# Patient Record
Sex: MALE | Race: ASIAN | NOT HISPANIC OR LATINO | Employment: UNEMPLOYED | URBAN - METROPOLITAN AREA
[De-identification: names, ages, dates, MRNs, and addresses within clinical notes are randomized per-mention and may not be internally consistent; named-entity substitution may affect disease eponyms.]

---

## 2021-01-20 ENCOUNTER — HOSPITAL ENCOUNTER (EMERGENCY)
Facility: CLINIC | Age: 41
Discharge: HOME | End: 2021-01-20
Attending: EMERGENCY MEDICINE | Admitting: EMERGENCY MEDICINE
Payer: COMMERCIAL

## 2021-01-20 ENCOUNTER — APPOINTMENT (OUTPATIENT)
Dept: MRI IMAGING | Facility: CLINIC | Age: 41
End: 2021-01-20
Attending: EMERGENCY MEDICINE
Payer: COMMERCIAL

## 2021-01-20 ENCOUNTER — APPOINTMENT (OUTPATIENT)
Dept: CT IMAGING | Facility: CLINIC | Age: 41
End: 2021-01-20
Attending: EMERGENCY MEDICINE
Payer: COMMERCIAL

## 2021-01-20 VITALS
SYSTOLIC BLOOD PRESSURE: 165 MMHG | DIASTOLIC BLOOD PRESSURE: 112 MMHG | WEIGHT: 210 LBS | BODY MASS INDEX: 28.44 KG/M2 | HEIGHT: 72 IN | TEMPERATURE: 98.2 F | HEART RATE: 78 BPM | OXYGEN SATURATION: 100 % | RESPIRATION RATE: 18 BRPM

## 2021-01-20 DIAGNOSIS — R93.89 ABNORMAL MRI: ICD-10-CM

## 2021-01-20 DIAGNOSIS — I10 ESSENTIAL HYPERTENSION: ICD-10-CM

## 2021-01-20 LAB
ANION GAP SERPL CALCULATED.3IONS-SCNC: 1 MMOL/L (ref 3–14)
BASOPHILS # BLD AUTO: 0 10E9/L (ref 0–0.2)
BASOPHILS NFR BLD AUTO: 0.1 %
BUN SERPL-MCNC: 16 MG/DL (ref 7–30)
CALCIUM SERPL-MCNC: 9.1 MG/DL (ref 8.5–10.1)
CHLORIDE SERPL-SCNC: 105 MMOL/L (ref 94–109)
CO2 SERPL-SCNC: 32 MMOL/L (ref 20–32)
CREAT SERPL-MCNC: 1.11 MG/DL (ref 0.66–1.25)
DIFFERENTIAL METHOD BLD: ABNORMAL
EOSINOPHIL # BLD AUTO: 0.1 10E9/L (ref 0–0.7)
EOSINOPHIL NFR BLD AUTO: 0.7 %
ERYTHROCYTE [DISTWIDTH] IN BLOOD BY AUTOMATED COUNT: 14.1 % (ref 10–15)
GFR SERPL CREATININE-BSD FRML MDRD: 82 ML/MIN/{1.73_M2}
GLUCOSE SERPL-MCNC: 95 MG/DL (ref 70–99)
HCT VFR BLD AUTO: 43.2 % (ref 40–53)
HGB BLD-MCNC: 13.2 G/DL (ref 13.3–17.7)
IMM GRANULOCYTES # BLD: 0 10E9/L (ref 0–0.4)
IMM GRANULOCYTES NFR BLD: 0.6 %
LYMPHOCYTES # BLD AUTO: 2.2 10E9/L (ref 0.8–5.3)
LYMPHOCYTES NFR BLD AUTO: 31.2 %
MCH RBC QN AUTO: 24.3 PG (ref 26.5–33)
MCHC RBC AUTO-ENTMCNC: 30.6 G/DL (ref 31.5–36.5)
MCV RBC AUTO: 79 FL (ref 78–100)
MONOCYTES # BLD AUTO: 0.4 10E9/L (ref 0–1.3)
MONOCYTES NFR BLD AUTO: 5.8 %
NEUTROPHILS # BLD AUTO: 4.3 10E9/L (ref 1.6–8.3)
NEUTROPHILS NFR BLD AUTO: 61.6 %
NRBC # BLD AUTO: 0 10*3/UL
NRBC BLD AUTO-RTO: 0 /100
PLATELET # BLD AUTO: 227 10E9/L (ref 150–450)
POTASSIUM SERPL-SCNC: 4 MMOL/L (ref 3.4–5.3)
RBC # BLD AUTO: 5.44 10E12/L (ref 4.4–5.9)
SODIUM SERPL-SCNC: 138 MMOL/L (ref 133–144)
WBC # BLD AUTO: 7 10E9/L (ref 4–11)

## 2021-01-20 PROCEDURE — 80048 BASIC METABOLIC PNL TOTAL CA: CPT | Performed by: EMERGENCY MEDICINE

## 2021-01-20 PROCEDURE — A9585 GADOBUTROL INJECTION: HCPCS | Performed by: EMERGENCY MEDICINE

## 2021-01-20 PROCEDURE — 70553 MRI BRAIN STEM W/O & W/DYE: CPT

## 2021-01-20 PROCEDURE — 96374 THER/PROPH/DIAG INJ IV PUSH: CPT

## 2021-01-20 PROCEDURE — 255N000002 HC RX 255 OP 636: Performed by: EMERGENCY MEDICINE

## 2021-01-20 PROCEDURE — 250N000013 HC RX MED GY IP 250 OP 250 PS 637: Performed by: EMERGENCY MEDICINE

## 2021-01-20 PROCEDURE — 96375 TX/PRO/DX INJ NEW DRUG ADDON: CPT

## 2021-01-20 PROCEDURE — 70450 CT HEAD/BRAIN W/O DYE: CPT

## 2021-01-20 PROCEDURE — 85025 COMPLETE CBC W/AUTO DIFF WBC: CPT | Performed by: EMERGENCY MEDICINE

## 2021-01-20 PROCEDURE — 250N000011 HC RX IP 250 OP 636: Performed by: EMERGENCY MEDICINE

## 2021-01-20 PROCEDURE — 99285 EMERGENCY DEPT VISIT HI MDM: CPT | Mod: 25

## 2021-01-20 PROCEDURE — 93005 ELECTROCARDIOGRAM TRACING: CPT

## 2021-01-20 RX ORDER — TETRACAINE HYDROCHLORIDE 5 MG/ML
SOLUTION OPHTHALMIC
Status: DISCONTINUED
Start: 2021-01-20 | End: 2021-01-21 | Stop reason: HOSPADM

## 2021-01-20 RX ORDER — HYDROCHLOROTHIAZIDE 25 MG/1
25 TABLET ORAL ONCE
Status: DISCONTINUED | OUTPATIENT
Start: 2021-01-20 | End: 2021-01-20

## 2021-01-20 RX ORDER — HYDROCHLOROTHIAZIDE 25 MG/1
25 TABLET ORAL DAILY
Status: DISCONTINUED | OUTPATIENT
Start: 2021-01-21 | End: 2021-01-20

## 2021-01-20 RX ORDER — LIDOCAINE 40 MG/G
CREAM TOPICAL
Status: DISCONTINUED | OUTPATIENT
Start: 2021-01-20 | End: 2021-01-21 | Stop reason: HOSPADM

## 2021-01-20 RX ORDER — HYDROMORPHONE HYDROCHLORIDE 1 MG/ML
0.5 INJECTION, SOLUTION INTRAMUSCULAR; INTRAVENOUS; SUBCUTANEOUS ONCE
Status: COMPLETED | OUTPATIENT
Start: 2021-01-20 | End: 2021-01-20

## 2021-01-20 RX ORDER — DIAZEPAM 10 MG/2ML
5 INJECTION, SOLUTION INTRAMUSCULAR; INTRAVENOUS ONCE
Status: COMPLETED | OUTPATIENT
Start: 2021-01-20 | End: 2021-01-20

## 2021-01-20 RX ORDER — ACETAMINOPHEN 500 MG
1000 TABLET ORAL ONCE
Status: COMPLETED | OUTPATIENT
Start: 2021-01-20 | End: 2021-01-20

## 2021-01-20 RX ORDER — HYDROCHLOROTHIAZIDE 25 MG/1
25 TABLET ORAL DAILY
Qty: 30 TABLET | Refills: 0 | Status: SHIPPED | OUTPATIENT
Start: 2021-01-20 | End: 2023-06-28

## 2021-01-20 RX ORDER — HYDROCHLOROTHIAZIDE 25 MG/1
25 TABLET ORAL ONCE
Status: COMPLETED | OUTPATIENT
Start: 2021-01-20 | End: 2021-01-20

## 2021-01-20 RX ORDER — GADOBUTROL 604.72 MG/ML
7.5 INJECTION INTRAVENOUS ONCE
Status: COMPLETED | OUTPATIENT
Start: 2021-01-20 | End: 2021-01-20

## 2021-01-20 RX ADMIN — HYDROCHLOROTHIAZIDE 25 MG: 25 TABLET ORAL at 23:34

## 2021-01-20 RX ADMIN — ACETAMINOPHEN 1000 MG: 500 TABLET, FILM COATED ORAL at 18:03

## 2021-01-20 RX ADMIN — HYDROMORPHONE HYDROCHLORIDE 0.5 MG: 1 INJECTION, SOLUTION INTRAMUSCULAR; INTRAVENOUS; SUBCUTANEOUS at 18:03

## 2021-01-20 RX ADMIN — GADOBUTROL 7.5 ML: 604.72 INJECTION INTRAVENOUS at 21:48

## 2021-01-20 RX ADMIN — DIAZEPAM 5 MG: 10 INJECTION, SOLUTION INTRAMUSCULAR; INTRAVENOUS at 20:52

## 2021-01-20 ASSESSMENT — ENCOUNTER SYMPTOMS
HEMATURIA: 0
WEAKNESS: 1
NUMBNESS: 1
HEADACHES: 1
BACK PAIN: 1
FREQUENCY: 0
NAUSEA: 0
DYSURIA: 0

## 2021-01-20 ASSESSMENT — MIFFLIN-ST. JEOR: SCORE: 1895.55

## 2021-01-20 NOTE — ED PROVIDER NOTES
History   Chief Complaint:  Headache       HPI   Josselyn Brewster is a 40 year old male with history of chronic back pain who presents with headache. The patient reports that he scheduled to have an epidural injection done today for his back pain, was noticed to be hypertensive, was sent to urgent care, and was finally sent here to the ED. The patient notes that his blood pressure at  was 197/70. Patient endorses intermittent headaches over the past few days and states that he has a 1/10 headache currently located behind his left eye that has been present for the past few hours. He states that he has noted this headache intermittently over the past few days. Patient also endorses 7/10 back pain now and notes that he took a dose of oxycodone at 1200 today. Patient also notes that he has been experiencing intermittent numbness in his bilateral hands and weakness in his right foot since he had back surgery on 9/24/20. The patient denies photophobia, urinary changes, nausea, and other issues.      Review of Systems   Eyes: Negative for visual disturbance.   Gastrointestinal: Negative for nausea.   Genitourinary: Negative for dysuria, frequency, hematuria and urgency.   Musculoskeletal: Positive for back pain.   Neurological: Positive for weakness, numbness and headaches.   All other systems reviewed and are negative.        Allergies:  Levofloxacin  Morphine     Medications:  Adderall  Buspirone  Prozac  Neurontin  Oxycodone     Past Medical History:    Patient denies history of headaches and hypertension.   Arthritis  Limitation of joint movement  Chronic back pain  Opioid abuse  Lumbago    Depression  Essential hypertension    Past Surgical History:    Revision right L4, 5 Microdiskectomy open - 9/24/2020   Laminectomy - 1999     Family History:    The patient denies past family history.     Social History:  Patient presents to the ED alone.     Physical Exam     Patient Vitals for the past 24 hrs:   BP Temp Temp  src Pulse Resp SpO2 Height Weight   01/20/21 2040 (!) 189/119 -- -- 81 -- 100 % -- --   01/20/21 1920 (!) 197/118 -- -- 77 -- 100 % -- --   01/20/21 1900 (!) 190/116 -- -- 80 -- 99 % -- --   01/20/21 1845 (!) 198/127 -- -- 81 -- 100 % -- --   01/20/21 1830 (!) 193/118 -- -- 79 -- 100 % -- --   01/20/21 1715 -- -- -- 82 -- 100 % -- --   01/20/21 1705 (!) 191/121 -- -- 85 -- -- -- --   01/20/21 1639 (!) 201/127 98.2  F (36.8  C) Oral 88 18 99 % 1.829 m (6') 95.3 kg (210 lb)       Physical Exam  HENT:    Mouth/Throat: Oropharynx is without swelling or erythema. Oral mucosa moist.    Eyes: Conjunctivae are normal. No scleral icterus. Left eye intraocular pressure: 16  Neck: Neck supple. No cervical adenopathy. No meningismus.   Cardiovascular: Normal rate, regular rhythm and intact distal pulses.    Pulmonary/Chest: Effort normal and breath sounds normal.   Abdominal: Soft.  No distension. There is no tenderness.   Musculoskeletal:  No edema, No calf tenderness  Neurological:Alert and oriented. Coordination normal. Paresthesias of bilateral hands, left great than right. Right lower extremity weakness with dorsi flexion which he states is baseline, otherwise equal and normal strength in other extremities. Cranial nerves 2-12 intact.   Skin: Skin is warm and dry.   Psychiatric: Normal mood and Anxious affect.     Emergency Department Course   ECG (17:37:11):  Rate 86 bpm. WI interval 168. QRS duration 112. QT/QTc 402/481. P-R-T axes 53 50 47. Normal sinus rhythm. Possible left atrial enlargement. Prolonged QT. Abnormal ECG Interpreted at 1742 by Prisca Iglesias MD.       Imaging:  MR Brain w/o & w Contrast   Final Result   IMPRESSION:   1.  Nonspecific focus of T2 prolongation in the left frontal centrum   semiovale correlating with the finding on CT.   2.  The lack of mass effect and enhancement are consistent with a   benign lesion. An area of gliosis is favored.   3.  Findings not consistent with an area of  demyelination.   4.  6 month follow-up to ensure stability is recommended.   5.  Superimposed mild presumed chronic small vessel ischemic change in   the supratentorial white matter.   6.  No hemorrhage or stroke.      KIM SHIN MD      Head CT w/o contrast   Final Result   IMPRESSION:   1.  Ill-defined, non-masslike hypoattenuation in the left frontal deep white matter with maintained gray-white matter differentiation, as described above. This is indeterminate, though could still represent an area of ischemia or potentially a vascular    malformation, with artifact felt to be less likely. MRI brain without and with IV contrast is recommended for further characterization.   2.  No acute intracranial hemorrhage or hydrocephalus.         Results discussed with GLORIA CRAMER on 1/20/2021 8:12 PM.          Laboratory:  CBC: HGB 13.2 (L), WNL (WBC 7.0, )  BMP: Anion Gap 1 (L), WNL (Creatinine 1.11)       Emergency Department Course:    Reviewed:  I reviewed nursing notes, vitals and care everywhere    Assessments:  1652: I initially evaluated the patient in ED room 21.    1751: I reassessed the patient who stated their headache was a 1/10 and back pain a 7/10. Patient's allergies discussed. Allergy to morphine makes it difficult for the patient to urinate.       On repeat evaluation I updated him on the CT findings and spoke with him about the need for an MRI.  2300: Updated the patient on MRI findings, importance of follow-up, importance of taking blood pressure medication and having a blood pressure check again in 2 days.    Consults:  2013: I spoke with radiology who updated me on the patient's ct read.   2250: I spoke with the radiologist about MRI read.     Interventions:  Medications   lidocaine 1 % 0.1-1 mL (has no administration in time range)   lidocaine (LMX4) cream (has no administration in time range)   sodium chloride (PF) 0.9% PF flush 3 mL (has no administration in time range)    sodium chloride (PF) 0.9% PF flush 3 mL (has no administration in time range)   tetracaine (PONTOCAINE) 0.5 % ophthalmic solution (has no administration in time range)   hydrochlorothiazide (HYDRODIURIL) tablet 25 mg (has no administration in time range)   acetaminophen (TYLENOL) tablet 1,000 mg (1,000 mg Oral Given 1/20/21 1803)   HYDROmorphone (PF) (DILAUDID) injection 0.5 mg (0.5 mg Intravenous Given 1/20/21 1803)   diazepam (VALIUM) injection 5 mg (5 mg Intravenous Given 1/20/21 2052)   gadobutrol (GADAVIST) injection 7.5 mL (7.5 mLs Intravenous Given 1/20/21 2148)     1803, Tylenol, 1000 mg, PO  1803, Dilaudid, 0.5 mg, IV  2052, Valium, 5 mg, IV  Hydrochlorothiazide 25 mg PO    Disposition:  The patient was discharged to home. Plans to call brother.       Impression & Plan   Medical Decision Making:  Josselyn Brewster is a 40 year old male resents when he was noted to have high blood pressure today at an appointment to have a injection for back pain.  He also describes a few days of intermittent mild left sided headache.  He had no other neuro symptoms.  There is scant blood pressures in his medical records however I did note that he had a blood pressure of 171/117 in September of this past year.  However due to persistent hypertension and neurologic symptoms after adequately controlling his back pain a CT scan was obtained which revealed the nonspecific findings outlined above.  For this reason MRI was indicated.  The MRI returned without findings concerning for stroke.  In speaking with the radiologist he felt that likely the findings were consistent with scar type tissue but did recommend follow-up MRI in 6 months.  I feel likely the patient has had long-term poorly controlled blood pressure and therefore I will initiate antihypertensive medications in the emergency department.  He understands the importance of close follow-up in the next few days as it is likely he will require back and antihypertensive.   Also important to follow-up for evaluation of his headache and arrangement for repeat MRI.  RN will communicate this to his brother when he arrived to drive the patient home as he did receive Valium for the imaging.  Recommended returning to the emergency department with any new worsening or concerning symptoms.    Diagnosis:    ICD-10-CM    1. Essential hypertension  I10    2. Abnormal MRI  R93.89     follow up brain MRI in 6 months.       Discharge Medications:  New Prescriptions    HYDROCHLOROTHIAZIDE (HYDRODIURIL) 25 MG TABLET    Take 1 tablet (25 mg) by mouth daily       Scribe Disclosure:  IAddy, am serving as a scribe at 4:52 PM on 1/20/2021 to document services personally performed by Prisca Iglesias MD based on my observations and the provider's statements to me.     [facility]   January 20, 2021      Prisca Iglesias MD  01/20/21 0685

## 2021-01-20 NOTE — ED TRIAGE NOTES
"Pt presents for evaluation of hypertension associated with a headache. Pt was at the clinic today for an injection for known back pain. Was sent to , but was then sent here due to BP. Pt notes a headache for the last 2-3 days, around \"the crown of his head, and sometimes behind the left eye\". denies vision changes. Hx of numbness in bilateral hands and weakness in right foot since back surgery.  "

## 2021-01-21 LAB — INTERPRETATION ECG - MUSE: NORMAL

## 2021-01-21 NOTE — DISCHARGE INSTRUCTIONS
Take next dose of blood pressure medication in the morning.     Discharge Instructions  Headache    You were seen today for a headache. Headaches may be caused by many different things such as muscle tension, sinus inflammation, anxiety and stress, having too little sleep, too much alcohol, some medical conditions or injury. You may have a migraine, which is caused by changes in the blood vessels in your head.  At this time your provider does not find that your headache is a sign of anything dangerous or life-threatening.  However, sometimes the signs of serious illness do not show up right away.      Generally, every Emergency Department visit should have a follow-up clinic visit with either a primary or a specialty clinic/provider. Please follow-up as instructed by your emergency provider today.    Return to the Emergency Department if:  You get a new fever of 100.4 F or higher.  Your headache gets much worse.  You get a stiff neck with your headache.  You get a new headache that is significantly different or worse than headaches you have had before.  You are vomiting (throwing up) and cannot keep food or water down.  You have blurry or double vision or other problems with your eyes.  You have a new weakness on one side of your body.  You have difficulty with balance which is new.  You or your family thinks you are confused.  You have a seizure.    What can I do to help myself?  Pain medications - You may take a pain medication such as Tylenol  (acetaminophen), Advil , Motrin  (ibuprofen) or Aleve  (naproxen).  Take a pain reliever as soon as you notice symptoms.  Starting medications as soon as you start to have symptoms may lessen the amount of pain you have.  Relaxing in a quiet, dark room may help.  Get enough sleep and eat meals regularly.  You may need to watch for certain foods or other things which may trigger your headaches.  Keeping a journal of your headaches and possible triggers may help you and your  primary provider to identify things which you should avoid which may be causing your headaches.  If you were given a prescription for medicine here today, be sure to read all of the information (including the package insert) that comes with your prescription.  This will include important information about the medicine, its side effects, and any warnings that you need to know about.  The pharmacist who fills the prescription can provide more information and answer questions you may have about the medicine.  If you have questions or concerns that the pharmacist cannot address, please call or return to the Emergency Department.   Remember that you can always come back to the Emergency Department if you are not able to see your regular provider in the amount of time listed above, if you get any new symptoms, or if there is anything that worries you.

## 2021-05-29 ENCOUNTER — RECORDS - HEALTHEAST (OUTPATIENT)
Dept: ADMINISTRATIVE | Facility: CLINIC | Age: 41
End: 2021-05-29

## 2021-05-30 ENCOUNTER — RECORDS - HEALTHEAST (OUTPATIENT)
Dept: ADMINISTRATIVE | Facility: CLINIC | Age: 41
End: 2021-05-30

## 2021-06-02 ENCOUNTER — RECORDS - HEALTHEAST (OUTPATIENT)
Dept: ADMINISTRATIVE | Facility: CLINIC | Age: 41
End: 2021-06-02

## 2021-06-29 ENCOUNTER — APPOINTMENT (OUTPATIENT)
Dept: GENERAL RADIOLOGY | Facility: CLINIC | Age: 41
End: 2021-06-29
Attending: EMERGENCY MEDICINE
Payer: COMMERCIAL

## 2021-06-29 ENCOUNTER — APPOINTMENT (OUTPATIENT)
Dept: MRI IMAGING | Facility: CLINIC | Age: 41
End: 2021-06-29
Attending: EMERGENCY MEDICINE
Payer: COMMERCIAL

## 2021-06-29 ENCOUNTER — HOSPITAL ENCOUNTER (EMERGENCY)
Facility: CLINIC | Age: 41
Discharge: HOME | End: 2021-06-29
Attending: EMERGENCY MEDICINE | Admitting: EMERGENCY MEDICINE
Payer: COMMERCIAL

## 2021-06-29 VITALS
HEART RATE: 81 BPM | BODY MASS INDEX: 28.44 KG/M2 | OXYGEN SATURATION: 95 % | HEIGHT: 72 IN | TEMPERATURE: 98.4 F | SYSTOLIC BLOOD PRESSURE: 149 MMHG | DIASTOLIC BLOOD PRESSURE: 104 MMHG | WEIGHT: 210 LBS | RESPIRATION RATE: 18 BRPM

## 2021-06-29 DIAGNOSIS — R07.9 CHEST PAIN, UNSPECIFIED TYPE: ICD-10-CM

## 2021-06-29 DIAGNOSIS — R51.9 ACUTE NONINTRACTABLE HEADACHE, UNSPECIFIED HEADACHE TYPE: ICD-10-CM

## 2021-06-29 DIAGNOSIS — I10 BENIGN ESSENTIAL HYPERTENSION: ICD-10-CM

## 2021-06-29 LAB
ANION GAP SERPL CALCULATED.3IONS-SCNC: 3 MMOL/L (ref 3–14)
BASOPHILS # BLD AUTO: 0 10E9/L (ref 0–0.2)
BASOPHILS NFR BLD AUTO: 0.1 %
BUN SERPL-MCNC: 20 MG/DL (ref 7–30)
CALCIUM SERPL-MCNC: 9.2 MG/DL (ref 8.5–10.1)
CHLORIDE SERPL-SCNC: 106 MMOL/L (ref 94–109)
CO2 SERPL-SCNC: 28 MMOL/L (ref 20–32)
CREAT SERPL-MCNC: 1.17 MG/DL (ref 0.66–1.25)
D DIMER PPP FEU-MCNC: <0.3 UG/ML FEU (ref 0–0.5)
DIFFERENTIAL METHOD BLD: ABNORMAL
EOSINOPHIL # BLD AUTO: 0 10E9/L (ref 0–0.7)
EOSINOPHIL NFR BLD AUTO: 0.6 %
ERYTHROCYTE [DISTWIDTH] IN BLOOD BY AUTOMATED COUNT: 13.3 % (ref 10–15)
GFR SERPL CREATININE-BSD FRML MDRD: 77 ML/MIN/{1.73_M2}
GLUCOSE SERPL-MCNC: 114 MG/DL (ref 70–99)
HCT VFR BLD AUTO: 44.5 % (ref 40–53)
HGB BLD-MCNC: 13.9 G/DL (ref 13.3–17.7)
IMM GRANULOCYTES # BLD: 0 10E9/L (ref 0–0.4)
IMM GRANULOCYTES NFR BLD: 0.4 %
INTERPRETATION ECG - MUSE: NORMAL
LABORATORY COMMENT REPORT: ABNORMAL
LYMPHOCYTES # BLD AUTO: 1.7 10E9/L (ref 0.8–5.3)
LYMPHOCYTES NFR BLD AUTO: 22.9 %
MCH RBC QN AUTO: 25 PG (ref 26.5–33)
MCHC RBC AUTO-ENTMCNC: 31.2 G/DL (ref 31.5–36.5)
MCV RBC AUTO: 80 FL (ref 78–100)
MONOCYTES # BLD AUTO: 0.4 10E9/L (ref 0–1.3)
MONOCYTES NFR BLD AUTO: 6 %
NEUTROPHILS # BLD AUTO: 5 10E9/L (ref 1.6–8.3)
NEUTROPHILS NFR BLD AUTO: 70 %
NRBC # BLD AUTO: 0 10*3/UL
NRBC BLD AUTO-RTO: 0 /100
PLATELET # BLD AUTO: 236 10E9/L (ref 150–450)
POTASSIUM SERPL-SCNC: 4 MMOL/L (ref 3.4–5.3)
RBC # BLD AUTO: 5.55 10E12/L (ref 4.4–5.9)
SARS-COV-2 RNA RESP QL NAA+PROBE: POSITIVE
SODIUM SERPL-SCNC: 137 MMOL/L (ref 133–144)
SPECIMEN SOURCE: ABNORMAL
TROPONIN I SERPL-MCNC: <0.015 UG/L (ref 0–0.04)
TROPONIN I SERPL-MCNC: <0.015 UG/L (ref 0–0.04)
WBC # BLD AUTO: 7.2 10E9/L (ref 4–11)

## 2021-06-29 PROCEDURE — 96374 THER/PROPH/DIAG INJ IV PUSH: CPT | Mod: 59

## 2021-06-29 PROCEDURE — 85379 FIBRIN DEGRADATION QUANT: CPT | Performed by: EMERGENCY MEDICINE

## 2021-06-29 PROCEDURE — 87635 SARS-COV-2 COVID-19 AMP PRB: CPT | Performed by: EMERGENCY MEDICINE

## 2021-06-29 PROCEDURE — 250N000011 HC RX IP 250 OP 636: Performed by: EMERGENCY MEDICINE

## 2021-06-29 PROCEDURE — 80048 BASIC METABOLIC PNL TOTAL CA: CPT | Performed by: EMERGENCY MEDICINE

## 2021-06-29 PROCEDURE — 84484 ASSAY OF TROPONIN QUANT: CPT | Performed by: EMERGENCY MEDICINE

## 2021-06-29 PROCEDURE — 255N000002 HC RX 255 OP 636: Performed by: EMERGENCY MEDICINE

## 2021-06-29 PROCEDURE — 96375 TX/PRO/DX INJ NEW DRUG ADDON: CPT

## 2021-06-29 PROCEDURE — 70553 MRI BRAIN STEM W/O & W/DYE: CPT

## 2021-06-29 PROCEDURE — 93005 ELECTROCARDIOGRAM TRACING: CPT

## 2021-06-29 PROCEDURE — 84484 ASSAY OF TROPONIN QUANT: CPT | Mod: 91 | Performed by: EMERGENCY MEDICINE

## 2021-06-29 PROCEDURE — 96361 HYDRATE IV INFUSION ADD-ON: CPT

## 2021-06-29 PROCEDURE — A9585 GADOBUTROL INJECTION: HCPCS | Performed by: EMERGENCY MEDICINE

## 2021-06-29 PROCEDURE — 99285 EMERGENCY DEPT VISIT HI MDM: CPT | Mod: 25

## 2021-06-29 PROCEDURE — 71045 X-RAY EXAM CHEST 1 VIEW: CPT

## 2021-06-29 PROCEDURE — 250N000013 HC RX MED GY IP 250 OP 250 PS 637: Performed by: EMERGENCY MEDICINE

## 2021-06-29 PROCEDURE — 85025 COMPLETE CBC W/AUTO DIFF WBC: CPT | Performed by: EMERGENCY MEDICINE

## 2021-06-29 PROCEDURE — 258N000003 HC RX IP 258 OP 636: Performed by: EMERGENCY MEDICINE

## 2021-06-29 PROCEDURE — C9803 HOPD COVID-19 SPEC COLLECT: HCPCS

## 2021-06-29 RX ORDER — LORAZEPAM 1 MG/1
1 TABLET ORAL ONCE
Status: COMPLETED | OUTPATIENT
Start: 2021-06-29 | End: 2021-06-29

## 2021-06-29 RX ORDER — LABETALOL 100 MG/1
100 TABLET, FILM COATED ORAL 2 TIMES DAILY
Qty: 28 TABLET | Refills: 0 | Status: ON HOLD | OUTPATIENT
Start: 2021-06-29 | End: 2023-07-03

## 2021-06-29 RX ORDER — DIPHENHYDRAMINE HYDROCHLORIDE 50 MG/ML
25 INJECTION INTRAMUSCULAR; INTRAVENOUS ONCE
Status: COMPLETED | OUTPATIENT
Start: 2021-06-29 | End: 2021-06-29

## 2021-06-29 RX ORDER — ACETAMINOPHEN 325 MG/1
975 TABLET ORAL ONCE
Status: COMPLETED | OUTPATIENT
Start: 2021-06-29 | End: 2021-06-29

## 2021-06-29 RX ORDER — ONDANSETRON 2 MG/ML
4 INJECTION INTRAMUSCULAR; INTRAVENOUS ONCE
Status: COMPLETED | OUTPATIENT
Start: 2021-06-29 | End: 2021-06-29

## 2021-06-29 RX ORDER — METOCLOPRAMIDE HYDROCHLORIDE 5 MG/ML
10 INJECTION INTRAMUSCULAR; INTRAVENOUS ONCE
Status: COMPLETED | OUTPATIENT
Start: 2021-06-29 | End: 2021-06-29

## 2021-06-29 RX ORDER — LABETALOL 100 MG/1
100 TABLET, FILM COATED ORAL ONCE
Status: COMPLETED | OUTPATIENT
Start: 2021-06-29 | End: 2021-06-29

## 2021-06-29 RX ORDER — GADOBUTROL 604.72 MG/ML
10 INJECTION INTRAVENOUS ONCE
Status: COMPLETED | OUTPATIENT
Start: 2021-06-29 | End: 2021-06-29

## 2021-06-29 RX ADMIN — ACETAMINOPHEN 975 MG: 325 TABLET, FILM COATED ORAL at 05:16

## 2021-06-29 RX ADMIN — LABETALOL HYDROCHLORIDE 100 MG: 100 TABLET ORAL at 06:39

## 2021-06-29 RX ADMIN — METOCLOPRAMIDE HYDROCHLORIDE 10 MG: 5 INJECTION INTRAMUSCULAR; INTRAVENOUS at 05:13

## 2021-06-29 RX ADMIN — SODIUM CHLORIDE 1000 ML: 9 INJECTION, SOLUTION INTRAVENOUS at 05:12

## 2021-06-29 RX ADMIN — DIPHENHYDRAMINE HYDROCHLORIDE 25 MG: 50 INJECTION INTRAMUSCULAR; INTRAVENOUS at 05:13

## 2021-06-29 RX ADMIN — GADOBUTROL 10 ML: 604.72 INJECTION INTRAVENOUS at 07:11

## 2021-06-29 RX ADMIN — LORAZEPAM 1 MG: 1 TABLET ORAL at 05:59

## 2021-06-29 RX ADMIN — ONDANSETRON 4 MG: 2 INJECTION INTRAMUSCULAR; INTRAVENOUS at 03:58

## 2021-06-29 ASSESSMENT — ENCOUNTER SYMPTOMS
NECK PAIN: 0
NAUSEA: 1
DIAPHORESIS: 1
HEADACHES: 1

## 2021-06-29 ASSESSMENT — MIFFLIN-ST. JEOR: SCORE: 1895.55

## 2021-06-29 NOTE — ED PROVIDER NOTES
History   Chief Complaint:  Chest Pain; Headache        The history is provided by the patient.      Josselyn Brewster is a 40 year old male with history of hypertension and polysubstance abuse who presents with an episode of chest pain and headache. The patient states that today he had a headache, felt diaphoretic and clammy before experiencing chest pain. He then checked his blood pressure and noted it to be elevated before he experienced two minutes of blurry vision which has since resolved. He states that his blood pressure has been elevated lately but today this was higher. He notes that his headache has resolved as well but notes that he is now nauseous. He also notes that he has had headaches since his spinal surgery last November. He denies any history of heart issues. He notes of neck soreness but no neck pain today. He notes of occasional alcohol use but denies drug or tobacco use. No trauma to the head or other recent injury.      Review of Systems   Constitutional: Positive for diaphoresis.   Eyes: Positive for visual disturbance (resolved).   Cardiovascular: Positive for chest pain.   Gastrointestinal: Positive for nausea.   Musculoskeletal: Negative for neck pain.   Neurological: Positive for headaches.   All other systems reviewed and are negative.      Allergies:  Levofloxacin  Morphine    Medications:  Adderall  Fluoxetine  Gabapentin  Hydrochlorothiazide  Buspirone HCl    Past Medical History:    Lumbago  Major depressive disorder  Polysubstance abuse  Personality disorder  Opiate dependence  Hypertension    Past Surgical History:    Lumbar epidural steroid infection x3  Laminectomy    Social History:  The patient presents to the emergency department alone.  The patient enjoys playing [employer] ball.     Physical Exam     Patient Vitals for the past 24 hrs:   BP Temp Temp src Pulse Resp SpO2 Height Weight   06/29/21 0630 (!) 181/110 -- -- 84 -- 99 % -- --   06/29/21 0545 (!) 203/114 -- -- 87 -- 98 %  -- --   21 0530 (!) 192/113 -- -- 85 -- 98 % -- --   21 0517 (!) 199/129 -- -- -- -- 100 % -- --   21 0506 (!) 211/129 98.4  F (36.9  C) Oral 86 18 100 % -- --   21 0341 (!) 220/131 98.4  F (36.9  C) Oral 95 18 100 % 1.829 m (6') 95.3 kg (210 lb)       Physical Exam  General: Patient is awake, alert and interactive when I enter the room  Head: The scalp, face, and head appear normal  Eyes: The pupils are equal, round, and reactive to light. Conjunctivae and sclerae are normal  Neck: Normal range of motion.   CV: Regular rate and rhythm.   Resp: Lungs are clear without wheezes or rales. No respiratory distress.   GI: Abdomen is soft, no rigidity, guarding, or rebound. No distension. No tenderness to palpation in any quadrant.     MS: Normal tone. Joints grossly normal without effusions. No asymmetric leg swelling, calf or thigh tenderness.    Skin: No rash or lesions noted. Normal capillary refill noted  Neuro: CN's II-XII without obvious abnormality.   Neg Romberg.  5/5 UE and LE strength which is symmetrical.   Reflexes are 2+ and symmetrical.   Finger to Nose testing is intact bilaterally.    Light touch is symmetrical bilateral UE's and LE's.  PERRLA, EOMI.    No meningismus and full neck AROM/PROM.   Psych:  Normal affect.  Appropriate interactions.    Emergency Department Course     ECG:  ECG taken at 0345, ECG read at 0504  Normal sinus rhythm. Normal ECG.  N change as compared to prior EKG dated 21   Rate 92 bpm. KY interval 176 ms. QRS duration 110 ms. QT/QTc 380/469 ms. P-R-T axis 56 61 50.       Imaging:  MR Brain w/o & with contrast:  Pending    Laboratory:  CBC: WBC: 7.2, HB.9, PLT: 236    BMP: Glucose 114 (high), o/w WNL (Creatinine: 1.17)    Troponin (0357): <0.015    Emergency Department Course:    Reviewed:  I reviewed the patient's nursing notes, vitals, past medical records, Care Everywhere.     Assessments:  0448: I assessed the patient and performed a physical  exam at this time.    Interventions:  0358 Zofran 4mg IV  0512 NS 1L IV   0513 Benadryl 25mg IV  0513 Reglan 10mg IV  0516 Tylenol 975mg PO  0559 Ativan 1mg PO  0639 Labetalol 100mg PO    Disposition:  Care of the patient was transferred to my colleague Dr. Francois pending MRI results.     Impression & Plan     Medical Decision Making:  Josselyn Brewster is a 40 year old male with history of hypertension and polysubstance abuse who presents with an episode of chest pain and headache.   Upon initial evaluation the patient is quite hypertensive but otherwise hemodynamically stable and afebrile.  On physical exam he does not have any acute neurological deficits.  However given his complaint of headache and nausea I was concerned about the possibility of intracranial pathology .  Given his significant hypertension I elected to perform an MRI of his brain.  The results of this are currently pending.  If MRI does not show any significant acute sinister pathology I believe he likely can be discharged home and started on antihypertensive regime.  Chest pain work-up did not show any acute signs of ischemia nor dysrhythmia.  No other acute signs of endorgan damage requiring inpatient admission.  Patient be signed out to my colleague Dr. Francois pending final results.    Diagnosis:    ICD-10-CM    1. Benign essential hypertension  I10    2. Acute nonintractable headache, unspecified headache type  R51.9    3. Chest pain, unspecified type  R07.9        Discharge Medications:  New Prescriptions    LABETALOL (NORMODYNE) 100 MG TABLET    Take 1 tablet (100 mg) by mouth 2 times daily       Scribe Disclosure:  I, Donovan Gonsales, am serving as a scribe at 4:48 AM on 6/29/2021 to document services personally performed by Will Leahy MD based on my observations and the provider's statements to me.          Will Leahy MD  06/29/21 7933

## 2021-06-29 NOTE — ED NOTES
DATE:  6/29/2021   TIME OF RECEIPT FROM LAB:  0724  LAB TEST:  covid  LAB VALUE:  positive  RESULTS GIVEN WITH READ-BACK TO (PROVIDER):  young  TIME LAB VALUE REPORTED TO PROVIDER:   0724

## 2021-06-29 NOTE — ED TRIAGE NOTES
Here for left sided chest pain started around 11pm, associated with /129, headache, nausea, clammy, lightheaded. ABCs intact.

## 2021-06-29 NOTE — DISCHARGE INSTRUCTIONS
You are found to have significantly elevated blood pressure here today and will need to follow-up with your primary care doctor for further evaluation of your hypertension.  I will start you on medications for your blood pressure.  We did not find any significant evidence of heart damage or neurological damage based on your work-up here today.

## 2021-06-29 NOTE — ED PROVIDER NOTES
"0700 Patient is signed out to me by Dr. Leahy pending MRI brain and COVID test with presentation of headache and chest pain in the setting of hypertension (not medicated daily). COVID test positive soon after transfer of care. Patient reports known COVID positive from test in \"February or March\". He denies respiratory symptoms. He denies current chest pain. Will test further for chest pain in the setting of recent COVID and await MRI brain results.     0812 Radiology results reviewed by me.    XR CHEST PORT 1 VIEW 6/29/2021 7:47 AM     HISTORY: chest pain, positive COVID 19     COMPARISON: None.                                                                      IMPRESSION: No acute findings. The lungs are clear and there are no  pleural effusions. Normal heart size.     CAMMY BOLDEN MD    MRI BRAIN WITHOUT AND WITH CONTRAST June 29, 2021 7:17 AM     HISTORY: Headache and blurred vision that began yesterday.  Intracranial hemorrhage suspected.      TECHNIQUE: Multiplanar, multisequence MRI of the brain without and  with 10 mL Gadavist.     COMPARISON: 1/20/2021.     FINDINGS: Diffusion-weighted images are normal. There is no evidence  for intracranial hemorrhage or acute infarct. There are a few  scattered signal hyperintensities in the supratentorial white matter  with the largest lesion in the left frontal lobe measuring  approximately 0.8 x 1.3 cm in size. This is not appreciably changed  since the prior exam. Brain parenchyma is otherwise normal. Ventricles  and subarachnoid spaces are normal. Postcontrast images do not show  any abnormal areas of enhancement or any focal mass lesions.                                                                      IMPRESSION:  1. Few scattered nonspecific white matter lesions which are stable  since 1/20/2021. The pattern could be related to gliosis, chronic  ischemic change, or old demyelination.  2. No evidence for acute hemorrhage, acute infarct, or a focal " mass  lesions.     MD Alessandro CHAIREZ Tracy Dianne, MD  07/08/21 5164

## 2023-06-28 ENCOUNTER — APPOINTMENT (OUTPATIENT)
Dept: CT IMAGING | Facility: CLINIC | Age: 43
End: 2023-06-28
Attending: EMERGENCY MEDICINE
Payer: COMMERCIAL

## 2023-06-28 ENCOUNTER — APPOINTMENT (OUTPATIENT)
Dept: ULTRASOUND IMAGING | Facility: CLINIC | Age: 43
End: 2023-06-28
Attending: EMERGENCY MEDICINE
Payer: COMMERCIAL

## 2023-06-28 ENCOUNTER — HOSPITAL ENCOUNTER (INPATIENT)
Facility: CLINIC | Age: 43
LOS: 5 days | Discharge: HOME | End: 2023-07-03
Attending: EMERGENCY MEDICINE | Admitting: INTERNAL MEDICINE
Payer: COMMERCIAL

## 2023-06-28 DIAGNOSIS — K85.90 ACUTE PANCREATITIS, UNSPECIFIED COMPLICATION STATUS, UNSPECIFIED PANCREATITIS TYPE: ICD-10-CM

## 2023-06-28 DIAGNOSIS — K85.90 ACUTE PANCREATITIS WITHOUT INFECTION OR NECROSIS, UNSPECIFIED PANCREATITIS TYPE: ICD-10-CM

## 2023-06-28 DIAGNOSIS — E78.1 HYPERTRIGLYCERIDEMIA: ICD-10-CM

## 2023-06-28 DIAGNOSIS — E13.10 DIABETIC KETOACIDOSIS WITHOUT COMA ASSOCIATED WITH OTHER SPECIFIED DIABETES MELLITUS (H): Primary | ICD-10-CM

## 2023-06-28 DIAGNOSIS — F10.29 ALCOHOL DEPENDENCE WITH UNSPECIFIED ALCOHOL-INDUCED DISORDER (H): ICD-10-CM

## 2023-06-28 LAB
ALBUMIN SERPL BCG-MCNC: 3.6 G/DL (ref 3.5–5.2)
ALBUMIN SERPL BCG-MCNC: NORMAL G/DL
ALP SERPL-CCNC: 78 U/L (ref 40–129)
ALP SERPL-CCNC: NORMAL U/L
ALT SERPL W P-5'-P-CCNC: 21 U/L (ref 0–70)
ALT SERPL W P-5'-P-CCNC: NORMAL U/L
ANION GAP SERPL CALCULATED.3IONS-SCNC: 11 MMOL/L (ref 7–15)
ANION GAP SERPL CALCULATED.3IONS-SCNC: 12 MMOL/L (ref 7–15)
ANION GAP SERPL CALCULATED.3IONS-SCNC: 13 MMOL/L (ref 7–15)
ANION GAP SERPL CALCULATED.3IONS-SCNC: 14 MMOL/L (ref 7–15)
ANION GAP SERPL CALCULATED.3IONS-SCNC: 17 MMOL/L (ref 7–15)
ANION GAP SERPL CALCULATED.3IONS-SCNC: 18 MMOL/L (ref 7–15)
ANION GAP SERPL CALCULATED.3IONS-SCNC: NORMAL MMOL/L
AST SERPL W P-5'-P-CCNC: ABNORMAL U/L
AST SERPL W P-5'-P-CCNC: NORMAL U/L
ATRIAL RATE - MUSE: 90 BPM
B-OH-BUTYR SERPL-SCNC: 2 MMOL/L
B-OH-BUTYR SERPL-SCNC: 2.2 MMOL/L
B-OH-BUTYR SERPL-SCNC: 2.6 MMOL/L
B-OH-BUTYR SERPL-SCNC: 4.1 MMOL/L
BASOPHILS # BLD AUTO: 0 10E3/UL (ref 0–0.2)
BASOPHILS # BLD AUTO: 0 10E3/UL (ref 0–0.2)
BASOPHILS NFR BLD AUTO: 0 %
BASOPHILS NFR BLD AUTO: 0 %
BILIRUB DIRECT SERPL-MCNC: <0.2 MG/DL (ref 0–0.3)
BILIRUB SERPL-MCNC: 0.5 MG/DL
BILIRUB SERPL-MCNC: NORMAL MG/DL
BUN SERPL-MCNC: 3.6 MG/DL (ref 6–20)
BUN SERPL-MCNC: 4.5 MG/DL (ref 6–20)
BUN SERPL-MCNC: 4.7 MG/DL (ref 6–20)
BUN SERPL-MCNC: 5.5 MG/DL (ref 6–20)
BUN SERPL-MCNC: 7.7 MG/DL (ref 6–20)
BUN SERPL-MCNC: 9.6 MG/DL (ref 6–20)
BUN SERPL-MCNC: NORMAL MG/DL
CALCIUM SERPL-MCNC: 8.1 MG/DL (ref 8.6–10)
CALCIUM SERPL-MCNC: 8.2 MG/DL (ref 8.6–10)
CALCIUM SERPL-MCNC: 8.2 MG/DL (ref 8.6–10)
CALCIUM SERPL-MCNC: 8.3 MG/DL (ref 8.6–10)
CALCIUM SERPL-MCNC: 8.4 MG/DL (ref 8.6–10)
CALCIUM SERPL-MCNC: 8.4 MG/DL (ref 8.6–10)
CALCIUM SERPL-MCNC: NORMAL MG/DL
CHLORIDE SERPL-SCNC: 93 MMOL/L (ref 98–107)
CHLORIDE SERPL-SCNC: 95 MMOL/L (ref 98–107)
CHLORIDE SERPL-SCNC: 96 MMOL/L (ref 98–107)
CHLORIDE SERPL-SCNC: 97 MMOL/L (ref 98–107)
CHLORIDE SERPL-SCNC: 97 MMOL/L (ref 98–107)
CHLORIDE SERPL-SCNC: 98 MMOL/L (ref 98–107)
CHLORIDE SERPL-SCNC: NORMAL MMOL/L
CHOLEST SERPL-MCNC: 869 MG/DL
CREAT SERPL-MCNC: 0.66 MG/DL (ref 0.67–1.17)
CREAT SERPL-MCNC: 0.69 MG/DL (ref 0.67–1.17)
CREAT SERPL-MCNC: 0.75 MG/DL (ref 0.67–1.17)
CREAT SERPL-MCNC: 0.76 MG/DL (ref 0.67–1.17)
CREAT SERPL-MCNC: 0.76 MG/DL (ref 0.67–1.17)
CREAT SERPL-MCNC: 0.79 MG/DL (ref 0.67–1.17)
CREAT SERPL-MCNC: NORMAL MG/DL
DEPRECATED HCO3 PLAS-SCNC: 15 MMOL/L (ref 22–29)
DEPRECATED HCO3 PLAS-SCNC: 16 MMOL/L (ref 22–29)
DEPRECATED HCO3 PLAS-SCNC: 18 MMOL/L (ref 22–29)
DEPRECATED HCO3 PLAS-SCNC: 20 MMOL/L (ref 22–29)
DEPRECATED HCO3 PLAS-SCNC: 20 MMOL/L (ref 22–29)
DEPRECATED HCO3 PLAS-SCNC: 22 MMOL/L (ref 22–29)
DEPRECATED HCO3 PLAS-SCNC: NORMAL MMOL/L
DIASTOLIC BLOOD PRESSURE - MUSE: NORMAL MMHG
EOSINOPHIL # BLD AUTO: 0.1 10E3/UL (ref 0–0.7)
EOSINOPHIL # BLD AUTO: 0.1 10E3/UL (ref 0–0.7)
EOSINOPHIL NFR BLD AUTO: 1 %
EOSINOPHIL NFR BLD AUTO: 1 %
ERYTHROCYTE [DISTWIDTH] IN BLOOD BY AUTOMATED COUNT: 14 % (ref 10–15)
ERYTHROCYTE [DISTWIDTH] IN BLOOD BY AUTOMATED COUNT: 14.5 % (ref 10–15)
GFR SERPL CREATININE-BSD FRML MDRD: >90 ML/MIN/1.73M2
GFR SERPL CREATININE-BSD FRML MDRD: NORMAL ML/MIN/{1.73_M2}
GLUCOSE BLDC GLUCOMTR-MCNC: 160 MG/DL (ref 70–99)
GLUCOSE BLDC GLUCOMTR-MCNC: 161 MG/DL (ref 70–99)
GLUCOSE BLDC GLUCOMTR-MCNC: 162 MG/DL (ref 70–99)
GLUCOSE BLDC GLUCOMTR-MCNC: 163 MG/DL (ref 70–99)
GLUCOSE BLDC GLUCOMTR-MCNC: 164 MG/DL (ref 70–99)
GLUCOSE BLDC GLUCOMTR-MCNC: 165 MG/DL (ref 70–99)
GLUCOSE BLDC GLUCOMTR-MCNC: 166 MG/DL (ref 70–99)
GLUCOSE BLDC GLUCOMTR-MCNC: 168 MG/DL (ref 70–99)
GLUCOSE BLDC GLUCOMTR-MCNC: 171 MG/DL (ref 70–99)
GLUCOSE BLDC GLUCOMTR-MCNC: 172 MG/DL (ref 70–99)
GLUCOSE BLDC GLUCOMTR-MCNC: 172 MG/DL (ref 70–99)
GLUCOSE BLDC GLUCOMTR-MCNC: 174 MG/DL (ref 70–99)
GLUCOSE BLDC GLUCOMTR-MCNC: 180 MG/DL (ref 70–99)
GLUCOSE BLDC GLUCOMTR-MCNC: 182 MG/DL (ref 70–99)
GLUCOSE BLDC GLUCOMTR-MCNC: 206 MG/DL (ref 70–99)
GLUCOSE BLDC GLUCOMTR-MCNC: 236 MG/DL (ref 70–99)
GLUCOSE BLDC GLUCOMTR-MCNC: 252 MG/DL (ref 70–99)
GLUCOSE BLDC GLUCOMTR-MCNC: 280 MG/DL (ref 70–99)
GLUCOSE BLDC GLUCOMTR-MCNC: 388 MG/DL (ref 70–99)
GLUCOSE SERPL-MCNC: 142 MG/DL (ref 70–99)
GLUCOSE SERPL-MCNC: 148 MG/DL (ref 70–99)
GLUCOSE SERPL-MCNC: 159 MG/DL (ref 70–99)
GLUCOSE SERPL-MCNC: 161 MG/DL (ref 70–99)
GLUCOSE SERPL-MCNC: 356 MG/DL (ref 70–99)
GLUCOSE SERPL-MCNC: 454 MG/DL (ref 70–99)
GLUCOSE SERPL-MCNC: NORMAL MG/DL
HBA1C MFR BLD: 13.6 %
HCT VFR BLD AUTO: 36.5 % (ref 40–53)
HCT VFR BLD AUTO: 38.7 % (ref 40–53)
HDLC SERPL-MCNC: 19 MG/DL
HGB BLD-MCNC: 12.6 G/DL (ref 13.3–17.7)
HGB BLD-MCNC: 14 G/DL (ref 13.3–17.7)
HOLD SPECIMEN: NORMAL
HOLD SPECIMEN: NORMAL
IMM GRANULOCYTES # BLD: 0 10E3/UL
IMM GRANULOCYTES # BLD: 0 10E3/UL
IMM GRANULOCYTES NFR BLD: 0 %
IMM GRANULOCYTES NFR BLD: 1 %
INTERPRETATION ECG - MUSE: NORMAL
LACTATE SERPL-SCNC: 0.8 MMOL/L (ref 0.7–2)
LDLC SERPL CALC-MCNC: ABNORMAL MG/DL
LDLC SERPL DIRECT ASSAY-MCNC: 51 MG/DL
LIPASE SERPL-CCNC: 140 U/L (ref 13–60)
LIPASE SERPL-CCNC: 176 U/L (ref 13–60)
LYMPHOCYTES # BLD AUTO: 1.1 10E3/UL (ref 0.8–5.3)
LYMPHOCYTES # BLD AUTO: 1.2 10E3/UL (ref 0.8–5.3)
LYMPHOCYTES NFR BLD AUTO: 14 %
LYMPHOCYTES NFR BLD AUTO: 15 %
MAGNESIUM SERPL-MCNC: 1.9 MG/DL (ref 1.7–2.3)
MCH RBC QN AUTO: 28.1 PG (ref 26.5–33)
MCH RBC QN AUTO: 28.6 PG (ref 26.5–33)
MCHC RBC AUTO-ENTMCNC: 34.5 G/DL (ref 31.5–36.5)
MCHC RBC AUTO-ENTMCNC: 36.2 G/DL (ref 31.5–36.5)
MCV RBC AUTO: 79 FL (ref 78–100)
MCV RBC AUTO: 82 FL (ref 78–100)
MONOCYTES # BLD AUTO: 0.6 10E3/UL (ref 0–1.3)
MONOCYTES # BLD AUTO: 0.6 10E3/UL (ref 0–1.3)
MONOCYTES NFR BLD AUTO: 7 %
MONOCYTES NFR BLD AUTO: 9 %
NEUTROPHILS # BLD AUTO: 5.3 10E3/UL (ref 1.6–8.3)
NEUTROPHILS # BLD AUTO: 6.4 10E3/UL (ref 1.6–8.3)
NEUTROPHILS NFR BLD AUTO: 75 %
NEUTROPHILS NFR BLD AUTO: 77 %
NONHDLC SERPL-MCNC: 850 MG/DL
NRBC # BLD AUTO: 0 10E3/UL
NRBC # BLD AUTO: 0 10E3/UL
NRBC BLD AUTO-RTO: 0 /100
NRBC BLD AUTO-RTO: 0 /100
P AXIS - MUSE: 33 DEGREES
PLATELET # BLD AUTO: 197 10E3/UL (ref 150–450)
PLATELET # BLD AUTO: 249 10E3/UL (ref 150–450)
POTASSIUM SERPL-SCNC: 3.4 MMOL/L (ref 3.4–5.3)
POTASSIUM SERPL-SCNC: 3.5 MMOL/L (ref 3.4–5.3)
POTASSIUM SERPL-SCNC: 3.6 MMOL/L (ref 3.4–5.3)
POTASSIUM SERPL-SCNC: 3.8 MMOL/L (ref 3.4–5.3)
POTASSIUM SERPL-SCNC: 4.1 MMOL/L (ref 3.4–5.3)
POTASSIUM SERPL-SCNC: 4.9 MMOL/L (ref 3.4–5.3)
POTASSIUM SERPL-SCNC: NORMAL MMOL/L
PR INTERVAL - MUSE: 158 MS
PROT SERPL-MCNC: 6.4 G/DL (ref 6.4–8.3)
PROT SERPL-MCNC: NORMAL G/DL
QRS DURATION - MUSE: 102 MS
QT - MUSE: 374 MS
QTC - MUSE: 457 MS
R AXIS - MUSE: 43 DEGREES
RBC # BLD AUTO: 4.48 10E6/UL (ref 4.4–5.9)
RBC # BLD AUTO: 4.89 10E6/UL (ref 4.4–5.9)
SODIUM SERPL-SCNC: 127 MMOL/L (ref 136–145)
SODIUM SERPL-SCNC: 128 MMOL/L (ref 136–145)
SODIUM SERPL-SCNC: 128 MMOL/L (ref 136–145)
SODIUM SERPL-SCNC: 129 MMOL/L (ref 136–145)
SODIUM SERPL-SCNC: 129 MMOL/L (ref 136–145)
SODIUM SERPL-SCNC: 131 MMOL/L (ref 136–145)
SODIUM SERPL-SCNC: NORMAL MMOL/L
SYSTOLIC BLOOD PRESSURE - MUSE: NORMAL MMHG
T AXIS - MUSE: 66 DEGREES
TRIGL SERPL-MCNC: 3584 MG/DL
TROPONIN T SERPL HS-MCNC: 12 NG/L
VENTRICULAR RATE- MUSE: 90 BPM
WBC # BLD AUTO: 7.1 10E3/UL (ref 4–11)
WBC # BLD AUTO: 8.3 10E3/UL (ref 4–11)

## 2023-06-28 PROCEDURE — 82010 KETONE BODYS QUAN: CPT | Performed by: INTERNAL MEDICINE

## 2023-06-28 PROCEDURE — 250N000011 HC RX IP 250 OP 636: Performed by: INTERNAL MEDICINE

## 2023-06-28 PROCEDURE — 96374 THER/PROPH/DIAG INJ IV PUSH: CPT | Mod: 59

## 2023-06-28 PROCEDURE — 83735 ASSAY OF MAGNESIUM: CPT | Performed by: INTERNAL MEDICINE

## 2023-06-28 PROCEDURE — 80061 LIPID PANEL: CPT | Performed by: INTERNAL MEDICINE

## 2023-06-28 PROCEDURE — 258N000003 HC RX IP 258 OP 636: Performed by: INTERNAL MEDICINE

## 2023-06-28 PROCEDURE — 84484 ASSAY OF TROPONIN QUANT: CPT | Performed by: EMERGENCY MEDICINE

## 2023-06-28 PROCEDURE — 84155 ASSAY OF PROTEIN SERUM: CPT | Performed by: EMERGENCY MEDICINE

## 2023-06-28 PROCEDURE — 93005 ELECTROCARDIOGRAM TRACING: CPT

## 2023-06-28 PROCEDURE — 82310 ASSAY OF CALCIUM: CPT | Performed by: EMERGENCY MEDICINE

## 2023-06-28 PROCEDURE — 250N000011 HC RX IP 250 OP 636: Mod: JZ | Performed by: EMERGENCY MEDICINE

## 2023-06-28 PROCEDURE — 83690 ASSAY OF LIPASE: CPT | Performed by: EMERGENCY MEDICINE

## 2023-06-28 PROCEDURE — 83721 ASSAY OF BLOOD LIPOPROTEIN: CPT | Performed by: INTERNAL MEDICINE

## 2023-06-28 PROCEDURE — 99222 1ST HOSP IP/OBS MODERATE 55: CPT | Performed by: INTERNAL MEDICINE

## 2023-06-28 PROCEDURE — 83605 ASSAY OF LACTIC ACID: CPT | Performed by: EMERGENCY MEDICINE

## 2023-06-28 PROCEDURE — 82010 KETONE BODYS QUAN: CPT | Performed by: EMERGENCY MEDICINE

## 2023-06-28 PROCEDURE — 82962 GLUCOSE BLOOD TEST: CPT

## 2023-06-28 PROCEDURE — 258N000002 HC RX IP 258 OP 250: Performed by: INTERNAL MEDICINE

## 2023-06-28 PROCEDURE — 80048 BASIC METABOLIC PNL TOTAL CA: CPT | Performed by: INTERNAL MEDICINE

## 2023-06-28 PROCEDURE — 99285 EMERGENCY DEPT VISIT HI MDM: CPT | Mod: 25

## 2023-06-28 PROCEDURE — 250N000011 HC RX IP 250 OP 636: Performed by: HOSPITALIST

## 2023-06-28 PROCEDURE — 82248 BILIRUBIN DIRECT: CPT | Performed by: EMERGENCY MEDICINE

## 2023-06-28 PROCEDURE — 74177 CT ABD & PELVIS W/CONTRAST: CPT

## 2023-06-28 PROCEDURE — 96361 HYDRATE IV INFUSION ADD-ON: CPT

## 2023-06-28 PROCEDURE — 200N000001 HC R&B ICU

## 2023-06-28 PROCEDURE — 250N000011 HC RX IP 250 OP 636: Performed by: EMERGENCY MEDICINE

## 2023-06-28 PROCEDURE — 83036 HEMOGLOBIN GLYCOSYLATED A1C: CPT | Performed by: EMERGENCY MEDICINE

## 2023-06-28 PROCEDURE — 36415 COLL VENOUS BLD VENIPUNCTURE: CPT | Performed by: INTERNAL MEDICINE

## 2023-06-28 PROCEDURE — 85025 COMPLETE CBC W/AUTO DIFF WBC: CPT | Performed by: EMERGENCY MEDICINE

## 2023-06-28 PROCEDURE — 250N000013 HC RX MED GY IP 250 OP 250 PS 637: Performed by: HOSPITALIST

## 2023-06-28 PROCEDURE — 96376 TX/PRO/DX INJ SAME DRUG ADON: CPT

## 2023-06-28 PROCEDURE — 258N000003 HC RX IP 258 OP 636: Performed by: EMERGENCY MEDICINE

## 2023-06-28 PROCEDURE — 76705 ECHO EXAM OF ABDOMEN: CPT

## 2023-06-28 PROCEDURE — 83690 ASSAY OF LIPASE: CPT | Performed by: INTERNAL MEDICINE

## 2023-06-28 PROCEDURE — 250N000009 HC RX 250: Performed by: INTERNAL MEDICINE

## 2023-06-28 PROCEDURE — 99207 PR NO BILLABLE SERVICE THIS VISIT: CPT | Performed by: HOSPITALIST

## 2023-06-28 PROCEDURE — 85025 COMPLETE CBC W/AUTO DIFF WBC: CPT | Performed by: INTERNAL MEDICINE

## 2023-06-28 PROCEDURE — 36415 COLL VENOUS BLD VENIPUNCTURE: CPT | Performed by: EMERGENCY MEDICINE

## 2023-06-28 PROCEDURE — 96375 TX/PRO/DX INJ NEW DRUG ADDON: CPT

## 2023-06-28 RX ORDER — NICOTINE POLACRILEX 4 MG
15-30 LOZENGE BUCCAL
Status: DISCONTINUED | OUTPATIENT
Start: 2023-06-28 | End: 2023-07-02

## 2023-06-28 RX ORDER — ENOXAPARIN SODIUM 100 MG/ML
40 INJECTION SUBCUTANEOUS EVERY 24 HOURS
Status: DISCONTINUED | OUTPATIENT
Start: 2023-06-28 | End: 2023-07-03 | Stop reason: HOSPADM

## 2023-06-28 RX ORDER — NICOTINE POLACRILEX 4 MG
15-30 LOZENGE BUCCAL
Status: DISCONTINUED | OUTPATIENT
Start: 2023-06-28 | End: 2023-06-28

## 2023-06-28 RX ORDER — ACETAMINOPHEN 500 MG
500-1000 TABLET ORAL EVERY 6 HOURS PRN
Status: ON HOLD | COMMUNITY
End: 2023-12-25

## 2023-06-28 RX ORDER — HYDROMORPHONE HYDROCHLORIDE 1 MG/ML
.3-.5 INJECTION, SOLUTION INTRAMUSCULAR; INTRAVENOUS; SUBCUTANEOUS
Status: DISCONTINUED | OUTPATIENT
Start: 2023-06-28 | End: 2023-06-28

## 2023-06-28 RX ORDER — AMOXICILLIN 250 MG
2 CAPSULE ORAL 2 TIMES DAILY PRN
Status: DISCONTINUED | OUTPATIENT
Start: 2023-06-28 | End: 2023-07-03 | Stop reason: HOSPADM

## 2023-06-28 RX ORDER — HYDROMORPHONE HYDROCHLORIDE 1 MG/ML
0.5 INJECTION, SOLUTION INTRAMUSCULAR; INTRAVENOUS; SUBCUTANEOUS EVERY 30 MIN PRN
Status: COMPLETED | OUTPATIENT
Start: 2023-06-28 | End: 2023-06-28

## 2023-06-28 RX ORDER — HYDROCHLOROTHIAZIDE 25 MG/1
25 TABLET ORAL DAILY
Status: ON HOLD | COMMUNITY
End: 2023-07-03

## 2023-06-28 RX ORDER — ONDANSETRON 2 MG/ML
4 INJECTION INTRAMUSCULAR; INTRAVENOUS EVERY 30 MIN PRN
Status: DISCONTINUED | OUTPATIENT
Start: 2023-06-28 | End: 2023-06-28

## 2023-06-28 RX ORDER — LIDOCAINE 40 MG/G
CREAM TOPICAL
Status: DISCONTINUED | OUTPATIENT
Start: 2023-06-28 | End: 2023-07-02

## 2023-06-28 RX ORDER — HYDROMORPHONE HYDROCHLORIDE 1 MG/ML
0.5 INJECTION, SOLUTION INTRAMUSCULAR; INTRAVENOUS; SUBCUTANEOUS
Status: COMPLETED | OUTPATIENT
Start: 2023-06-28 | End: 2023-06-28

## 2023-06-28 RX ORDER — IOPAMIDOL 755 MG/ML
500 INJECTION, SOLUTION INTRAVASCULAR ONCE
Status: COMPLETED | OUTPATIENT
Start: 2023-06-28 | End: 2023-06-28

## 2023-06-28 RX ORDER — ONDANSETRON 4 MG/1
4 TABLET, ORALLY DISINTEGRATING ORAL EVERY 6 HOURS PRN
Status: DISCONTINUED | OUTPATIENT
Start: 2023-06-28 | End: 2023-07-03 | Stop reason: HOSPADM

## 2023-06-28 RX ORDER — CITALOPRAM HYDROBROMIDE 40 MG/1
40 TABLET ORAL DAILY
COMMUNITY

## 2023-06-28 RX ORDER — DEXTROSE MONOHYDRATE 25 G/50ML
25-50 INJECTION, SOLUTION INTRAVENOUS
Status: DISCONTINUED | OUTPATIENT
Start: 2023-06-28 | End: 2023-06-28

## 2023-06-28 RX ORDER — HYDROXYZINE HCL 10 MG/5 ML
20 SOLUTION, ORAL ORAL EVERY 6 HOURS PRN
Status: DISCONTINUED | OUTPATIENT
Start: 2023-06-28 | End: 2023-06-29

## 2023-06-28 RX ORDER — AMOXICILLIN 250 MG
1 CAPSULE ORAL 2 TIMES DAILY PRN
Status: DISCONTINUED | OUTPATIENT
Start: 2023-06-28 | End: 2023-07-03 | Stop reason: HOSPADM

## 2023-06-28 RX ORDER — ONDANSETRON 2 MG/ML
4 INJECTION INTRAMUSCULAR; INTRAVENOUS EVERY 6 HOURS PRN
Status: DISCONTINUED | OUTPATIENT
Start: 2023-06-28 | End: 2023-07-03 | Stop reason: HOSPADM

## 2023-06-28 RX ORDER — DEXTROSE MONOHYDRATE 25 G/50ML
25-50 INJECTION, SOLUTION INTRAVENOUS
Status: DISCONTINUED | OUTPATIENT
Start: 2023-06-28 | End: 2023-07-02

## 2023-06-28 RX ORDER — SODIUM CHLORIDE 9 MG/ML
INJECTION, SOLUTION INTRAVENOUS CONTINUOUS
Status: DISCONTINUED | OUTPATIENT
Start: 2023-06-28 | End: 2023-06-28

## 2023-06-28 RX ORDER — IBUPROFEN 200 MG
200-600 TABLET ORAL EVERY 4 HOURS PRN
Status: ON HOLD | COMMUNITY
End: 2023-12-25

## 2023-06-28 RX ORDER — HYDROMORPHONE HYDROCHLORIDE 1 MG/ML
.5-1 INJECTION, SOLUTION INTRAMUSCULAR; INTRAVENOUS; SUBCUTANEOUS
Status: DISCONTINUED | OUTPATIENT
Start: 2023-06-28 | End: 2023-07-02

## 2023-06-28 RX ADMIN — HYDROMORPHONE HYDROCHLORIDE 0.5 MG: 1 INJECTION, SOLUTION INTRAMUSCULAR; INTRAVENOUS; SUBCUTANEOUS at 05:46

## 2023-06-28 RX ADMIN — ONDANSETRON 4 MG: 2 INJECTION INTRAMUSCULAR; INTRAVENOUS at 01:26

## 2023-06-28 RX ADMIN — POTASSIUM CHLORIDE: 2 INJECTION, SOLUTION, CONCENTRATE INTRAVENOUS at 10:31

## 2023-06-28 RX ADMIN — Medication 3 UNITS/HR: at 17:10

## 2023-06-28 RX ADMIN — POTASSIUM CHLORIDE: 149 INJECTION, SOLUTION, CONCENTRATE INTRAVENOUS at 09:35

## 2023-06-28 RX ADMIN — HYDROMORPHONE HYDROCHLORIDE 0.5 MG: 1 INJECTION, SOLUTION INTRAMUSCULAR; INTRAVENOUS; SUBCUTANEOUS at 22:13

## 2023-06-28 RX ADMIN — POTASSIUM CHLORIDE: 2 INJECTION, SOLUTION, CONCENTRATE INTRAVENOUS at 20:41

## 2023-06-28 RX ADMIN — HYDROMORPHONE HYDROCHLORIDE 0.5 MG: 1 INJECTION, SOLUTION INTRAMUSCULAR; INTRAVENOUS; SUBCUTANEOUS at 20:07

## 2023-06-28 RX ADMIN — HYDROMORPHONE HYDROCHLORIDE 0.5 MG: 1 INJECTION, SOLUTION INTRAMUSCULAR; INTRAVENOUS; SUBCUTANEOUS at 08:33

## 2023-06-28 RX ADMIN — HYDROMORPHONE HYDROCHLORIDE 0.5 MG: 1 INJECTION, SOLUTION INTRAMUSCULAR; INTRAVENOUS; SUBCUTANEOUS at 01:26

## 2023-06-28 RX ADMIN — SODIUM CHLORIDE 1000 ML: 9 INJECTION, SOLUTION INTRAVENOUS at 05:35

## 2023-06-28 RX ADMIN — HYDROMORPHONE HYDROCHLORIDE 0.5 MG: 1 INJECTION, SOLUTION INTRAMUSCULAR; INTRAVENOUS; SUBCUTANEOUS at 09:56

## 2023-06-28 RX ADMIN — Medication 2.5 UNITS/HR: at 08:05

## 2023-06-28 RX ADMIN — SODIUM CHLORIDE 500 ML: 9 INJECTION, SOLUTION INTRAVENOUS at 08:33

## 2023-06-28 RX ADMIN — ONDANSETRON 4 MG: 2 INJECTION INTRAMUSCULAR; INTRAVENOUS at 23:08

## 2023-06-28 RX ADMIN — HYDROMORPHONE HYDROCHLORIDE 0.5 MG: 1 INJECTION, SOLUTION INTRAMUSCULAR; INTRAVENOUS; SUBCUTANEOUS at 03:42

## 2023-06-28 RX ADMIN — ENOXAPARIN SODIUM 40 MG: 40 INJECTION SUBCUTANEOUS at 20:07

## 2023-06-28 RX ADMIN — HYDROXYZINE HYDROCHLORIDE 20 MG: 10 SOLUTION ORAL at 11:07

## 2023-06-28 RX ADMIN — HYDROMORPHONE HYDROCHLORIDE 0.5 MG: 1 INJECTION, SOLUTION INTRAMUSCULAR; INTRAVENOUS; SUBCUTANEOUS at 12:30

## 2023-06-28 RX ADMIN — SODIUM CHLORIDE 1000 ML: 9 INJECTION, SOLUTION INTRAVENOUS at 07:59

## 2023-06-28 RX ADMIN — SODIUM CHLORIDE 1000 ML: 9 INJECTION, SOLUTION INTRAVENOUS at 01:26

## 2023-06-28 RX ADMIN — SODIUM CHLORIDE: 9 INJECTION, SOLUTION INTRAVENOUS at 06:42

## 2023-06-28 RX ADMIN — IOPAMIDOL 100 ML: 755 INJECTION, SOLUTION INTRAVENOUS at 02:04

## 2023-06-28 RX ADMIN — HYDROMORPHONE HYDROCHLORIDE 0.5 MG: 1 INJECTION, SOLUTION INTRAMUSCULAR; INTRAVENOUS; SUBCUTANEOUS at 14:40

## 2023-06-28 ASSESSMENT — ACTIVITIES OF DAILY LIVING (ADL)
TOILETING_ISSUES: NO
CHANGE_IN_FUNCTIONAL_STATUS_SINCE_ONSET_OF_CURRENT_ILLNESS/INJURY: NO
WEAR_GLASSES_OR_BLIND: NO
ADLS_ACUITY_SCORE: 18
DOING_ERRANDS_INDEPENDENTLY_DIFFICULTY: NO
ADLS_ACUITY_SCORE: 18
ADLS_ACUITY_SCORE: 35
ADLS_ACUITY_SCORE: 18
ADLS_ACUITY_SCORE: 18
DIFFICULTY_EATING/SWALLOWING: NO
ADLS_ACUITY_SCORE: 18
ADLS_ACUITY_SCORE: 18
ADLS_ACUITY_SCORE: 35
DRESSING/BATHING_DIFFICULTY: NO
WALKING_OR_CLIMBING_STAIRS_DIFFICULTY: NO
CONCENTRATING,_REMEMBERING_OR_MAKING_DECISIONS_DIFFICULTY: NO
FALL_HISTORY_WITHIN_LAST_SIX_MONTHS: NO
ADLS_ACUITY_SCORE: 18
ADLS_ACUITY_SCORE: 35

## 2023-06-28 NOTE — PROGRESS NOTES
"Assuming care.  This patient presented to the emergency department complaining of epigastric pain present for 3 days since last Sunday when he had alcohol beverage intake.  Denies prior history of alcohol pancreatitis, lipase and CT of the abdomen are compatible with this diagnosis.  He had felt the nausea but not vomiting.  On arrival his blood sugar has been noted to be 308..  He has a family history of diabetes but denies of knowing personal.  A1c 13.8 to correlate with average blood sugar around 350.  He has other chronic comorbidities including hyperlipidemia, high blood pressure, history of polysubstance abuse and alcohol consumption as documented by my partner on admission.  At the moment of my visit his pain is under control, he denies any nausea.  I have reviewed the lab work-up on his chart.  I discussed the treatment with the patient and his significant other.  Vital signs:  Temp: 98.9  F (37.2  C) Temp src: Oral BP: (Abnormal) 145/119 Pulse: 89   Resp: 17 SpO2: 97 % O2 Device: None (Room air)   Height: 180.3 cm (5' 11\") Weight: 88.8 kg (195 lb 12.3 oz)  Estimated body mass index is 27.3 kg/m  as calculated from the following:    Height as of this encounter: 1.803 m (5' 11\").    Weight as of this encounter: 88.8 kg (195 lb 12.3 oz).      I am adding Vistaril as adjuvant medication for pain control.  I agree with assessment and plan as outlined by my partner   "

## 2023-06-28 NOTE — ED NOTES
"[facility]  ED Nurse Handoff Report    ED Chief complaint: Fever and Abdominal Pain  . ED Diagnosis:   Final diagnoses:   Acute pancreatitis without infection or necrosis, unspecified pancreatitis type       Allergies:   Allergies   Allergen Reactions    Levofloxacin Hives    Morphine Other (See Comments)     Other reaction(s): Urinary Retention  Other reaction(s): Urinary Retention       Code Status: Full Code    Activity level - Baseline/Home:  independent.  Activity Level - Current:   independent.   Lift room needed: No.   Bariatric: No   Needed: No   Isolation: No.   Infection: Not Applicable.     Respiratory status: Room air    Vital Signs (within 30 minutes):   Vitals:    06/28/23 0010 06/28/23 0053 06/28/23 0330   BP: (!) 152/107 (!) 143/95 103/79   Pulse: 99 94 80   Resp: 20     Temp: 98.5  F (36.9  C) 98.8  F (37.1  C)    TempSrc: Oral Oral    SpO2: 97%  95%   Weight: 88.8 kg (195 lb 12.3 oz)     Height: 1.803 m (5' 11\")         Cardiac Rhythm:  ,      Pain level:    Patient confused: No.   Patient Falls Risk: patient and family education.   Elimination Status: Has voided     Patient Report - Initial Complaint: abdominal pain.   Focused Assessment:  Josselyn Brewster is a 42 year old male with history of osteoarthritis and right-side lower back pain who presents to the ED via car for evaluation of fevers and abdominal pain. Patient reports upper, primarily right-sided, abdominal pain began 1.5 days ago and notes pain is just under his ribcage. He states pain is 5/10 currently. He endorses fevers as well, one this morning measuring 102. He notes only eating chicken noodle soup the last 2 days. He denies changes in bowel movements, abdominal pain, or a history of abdominal surgeries.     Independent Historian:   None - Patient Only     Review of External Notes:       Medications:  " "  Norvasc  Cozaar  Adderall  Buspirone  Prozac  Neurontin  Hydrodiuril  Normodyne  Senokot  Zanaflex  Celexa  Lioresal     Past Medical History:    Hyperlipidemia  Hypertension  Depression  Osteoarthritis  Eczema  Spinal stenosis of lumbar region with neurogenic claudication  Acute right-sided low back pain with right-sided sciatica  Lumbar herniated disc  History of cocaine dependence  History of polysubstance abuse     Past Surgical History:    Spine surgery x4  Hernia repair     Physical Exam      Patient Vitals for the past 24 hrs:    BP Temp Temp src Pulse Resp SpO2 Height Weight   06/28/23 0053 (!) 143/95 98.8  F (37.1  C) Oral 94 -- -- -- --   06/28/23 0010 (!) 152/107 98.5  F (36.9  C) Oral 99 20 97 % 1.803 m (5' 11\") 88.8 kg (195 lb 12.3 oz)      Physical Exam  General: Patient is awake, alert and interactive when I enter the room. Appears uncomfortable.   Head: The scalp, face, and head appear normal  Eyes: Conjunctivae and sclerae are normal  Neck: Normal range of motion.   CV: Regular rate.   Resp:  No respiratory distress.   GI:  tenderness but abdomen is soft, no rigidity. No evidence of pulsatile mass. No fluid waves or evidence of ascites. No distension. No hernias or bruising are noted in detailed exam. No CVA tenderness.         MS: Normal tone.   Skin: Normal capillary refill noted  Neuro: Speech is normal and fluent. Face is symmetric. Moving all extremities.   Psych:  Normal affect.  Appropriate interactions.    Abnormal Results:   Labs Ordered and Resulted from Time of ED Arrival to Time of ED Departure   COMPREHENSIVE METABOLIC PANEL - Abnormal       Result Value    Sodium 127 (*)     Potassium 4.9      Chloride 93 (*)     Carbon Dioxide (CO2) 16 (*)     Anion Gap 18 (*)     Urea Nitrogen 9.6      Creatinine 0.76      Calcium 8.2 (*)     Glucose 454 (*)     Alkaline Phosphatase 78      AST        ALT 21      Protein Total 6.4      Albumin 3.6      Bilirubin Total 0.5      GFR Estimate >90   "   LIPASE - Abnormal    Lipase 140 (*)    CBC WITH PLATELETS AND DIFFERENTIAL - Abnormal    WBC Count 8.3      RBC Count 4.89      Hemoglobin 14.0      Hematocrit 38.7 (*)     MCV 79      MCH 28.6      MCHC 36.2      RDW 14.0      Platelet Count 249      % Neutrophils 77      % Lymphocytes 14      % Monocytes 7      % Eosinophils 1      % Basophils 0      % Immature Granulocytes 1      NRBCs per 100 WBC 0      Absolute Neutrophils 6.4      Absolute Lymphocytes 1.2      Absolute Monocytes 0.6      Absolute Eosinophils 0.1      Absolute Basophils 0.0      Absolute Immature Granulocytes 0.0      Absolute NRBCs 0.0     GLUCOSE BY METER - Abnormal    GLUCOSE BY METER POCT 388 (*)    KETONE BETA-HYDROXYBUTYRATE QUANTITATIVE, RAPID - Abnormal    Ketone (Beta-Hydroxybutyrate) Quantitative 4.10 (*)    HEMOGLOBIN A1C - Abnormal    Hemoglobin A1C 13.6 (*)    GLUCOSE BY METER - Abnormal    GLUCOSE BY METER POCT 280 (*)    BASIC METABOLIC PANEL - Abnormal    Sodium 128 (*)     Potassium 3.8      Chloride 96 (*)     Carbon Dioxide (CO2) 15 (*)     Anion Gap 17 (*)     Urea Nitrogen 7.7      Creatinine 0.75      Calcium 8.4 (*)     Glucose 356 (*)     GFR Estimate >90     GLUCOSE BY METER - Abnormal    GLUCOSE BY METER POCT 252 (*)    TROPONIN T, HIGH SENSITIVITY - Normal    Troponin T, High Sensitivity 12     BILIRUBIN DIRECT - Normal    Bilirubin Direct <0.20     LACTIC ACID WHOLE BLOOD - Normal    Lactic Acid 0.8     LIPID REFLEX TO DIRECT LDL PANEL   COMPREHENSIVE METABOLIC PANEL   LIPASE   GLUCOSE MONITOR NURSING POCT   GLUCOSE MONITOR NURSING POCT   MAGNESIUM   LIPID REFLEX TO DIRECT LDL PANEL   GLUCOSE MONITOR NURSING POCT        US Abdomen Limited (RUQ)   Final Result   IMPRESSION:   1.  Hepatic steatosis.   2.  Normal gallbladder.            CT Abdomen Pelvis w Contrast   Final Result   IMPRESSION:    1.  Acute pancreatitis head/uncinate process.   2.  Hepatic steatosis.          Treatments provided: See Epic  Family  Comments: at bedside  OBS brochure/video discussed/provided to patient:  N/A  ED Medications:   Medications   ondansetron (ZOFRAN) injection 4 mg (4 mg Intravenous $Given 6/28/23 0126)   HYDROmorphone (PF) (DILAUDID) injection 0.5 mg (0.5 mg Intravenous $Given 6/28/23 0342)   lidocaine 1 % 0.1-1 mL (has no administration in time range)   lidocaine (LMX4) cream (has no administration in time range)   sodium chloride (PF) 0.9% PF flush 3 mL (3 mLs Intracatheter Not Given 6/28/23 0539)   sodium chloride (PF) 0.9% PF flush 3 mL (has no administration in time range)   melatonin tablet 1 mg (has no administration in time range)   HYDROmorphone (PF) (DILAUDID) injection 0.3-0.5 mg (0.5 mg Intravenous $Given 6/28/23 0546)   senna-docusate (SENOKOT-S/PERICOLACE) 8.6-50 MG per tablet 1 tablet (has no administration in time range)     Or   senna-docusate (SENOKOT-S/PERICOLACE) 8.6-50 MG per tablet 2 tablet (has no administration in time range)   ondansetron (ZOFRAN ODT) ODT tab 4 mg (has no administration in time range)     Or   ondansetron (ZOFRAN) injection 4 mg (has no administration in time range)   0.9% sodium chloride BOLUS (has no administration in time range)   0.9% sodium chloride BOLUS (has no administration in time range)   insulin 1 unit/1mL in saline (NovoLIN, HumuLIN Regular) drip - DKA algorithm (has no administration in time range)   glucose gel 15-30 g (has no administration in time range)     Or   dextrose 50 % injection 25-50 mL (has no administration in time range)     Or   glucagon injection 1 mg (has no administration in time range)   enoxaparin ANTICOAGULANT (LOVENOX) injection 40 mg (has no administration in time range)   NaCl 0.45 % 1,000 mL with potassium chloride 30 mEq/L infusion (has no administration in time range)   dextrose 5% and 0.45% NaCl 1,000 mL with potassium chloride 30 mEq/L infusion (has no administration in time range)   0.9% sodium chloride BOLUS (0 mLs Intravenous Stopped 6/28/23  0463)   HYDROmorphone (PF) (DILAUDID) injection 0.5 mg (0.5 mg Intravenous $Given 6/28/23 0126)   iopamidol (ISOVUE-370) solution 500 mL (100 mLs Intravenous $Given 6/28/23 0204)   sodium chloride (PF) 0.9% PF flush 100 mL (65 mLs Intravenous $Given 6/28/23 0204)   0.9% sodium chloride BOLUS (0 mLs Intravenous Stopped 6/28/23 0650)       Drips infusing:  Yes  For the majority of the shift this patient was Green.   Interventions performed were NA.    Sepsis treatment initiated: No    Cares/treatment/interventions/medications to be completed following ED care: NA    ED Nurse Name: Gail Arriaza RN  7:59 AM

## 2023-06-28 NOTE — PLAN OF CARE
"  Problem: Plan of Care - These are the overarching goals to be used throughout the patient stay.    Goal: Plan of Care Review  Description: The Plan of Care Review/Shift note should be completed every shift.  The Outcome Evaluation is a brief statement about your assessment that the patient is improving, declining, or no change.  This information will be displayed automatically on your shift note.  6/28/2023 1528 by Ramandeep Larose RN  Outcome: Progressing  6/28/2023 1527 by Ramandeep Larose RN  Outcome: Not Progressing  Goal: Patient-Specific Goal (Individualized)  Description: You can add care plan individualizations to a care plan. Examples of Individualization might be:  \"Parent requests to be called daily at 9am for status\", \"I have a hard time hearing out of my right ear\", or \"Do not touch me to wake me up as it startles me\".  6/28/2023 1528 by Ramandeep Larose RN  Outcome: Progressing  6/28/2023 1527 by Ramandeep Larose RN  Outcome: Not Progressing  Flowsheets (Taken 6/28/2023 0945)  Anxieties, Fears or Concerns: New diagnosis of diabetes  Goal: Absence of Hospital-Acquired Illness or Injury  6/28/2023 1528 by Ramandeep Larose RN  Outcome: Progressing  6/28/2023 1527 by Ramandeep Larose RN  Outcome: Not Progressing  Intervention: Identify and Manage Fall Risk  Recent Flowsheet Documentation  Taken 6/28/2023 1200 by Ramandeep Larose RN  Safety Promotion/Fall Prevention:   nonskid shoes/slippers when out of bed   patient and family education   safety round/check completed  Taken 6/28/2023 1000 by Ramandeep Larose RN  Safety Promotion/Fall Prevention:   nonskid shoes/slippers when out of bed   patient and family education   safety round/check completed  Intervention: Prevent Skin Injury  Recent Flowsheet Documentation  Taken 6/28/2023 1200 by Ramandeep Larose RN  Body Position: position changed independently  Taken 6/28/2023 1000 by Ramandeep Larose RN  Body Position: position changed independently  Taken " 6/28/2023 0926 by Ramandeep Larose RN  Body Position: position changed independently  Intervention: Prevent and Manage VTE (Venous Thromboembolism) Risk  Recent Flowsheet Documentation  Taken 6/28/2023 1200 by Ramandeep Larose RN  VTE Prevention/Management: SCDs (sequential compression devices) off  Taken 6/28/2023 1000 by Ramandeep Larose RN  VTE Prevention/Management: SCDs (sequential compression devices) off  Goal: Optimal Comfort and Wellbeing  6/28/2023 1528 by Ramandeep Larose RN  Outcome: Progressing  6/28/2023 1527 by Ramandeep Larose RN  Outcome: Not Progressing  Intervention: Provide Person-Centered Care  Recent Flowsheet Documentation  Taken 6/28/2023 1200 by Ramandeep Larose RN  Trust Relationship/Rapport:   care explained   questions answered  Taken 6/28/2023 0945 by Ramandeep Larose RN  Trust Relationship/Rapport:   care explained   questions answered  Goal: Readiness for Transition of Care  6/28/2023 1528 by Ramandeep Larose RN  Outcome: Progressing  6/28/2023 1527 by Ramandeep Larose RN  Outcome: Not Progressing  Flowsheets (Taken 6/28/2023 1100)  Transportation Anticipated: car, drives self  Intervention: Mutually Develop Transition Plan  Recent Flowsheet Documentation  Taken 6/28/2023 1100 by Ramandeep Larose RN  Transportation Anticipated: car, drives self  Patient/Family Anticipated Services at Transition: none  Patient/Family Anticipates Transition to: home  Taken 6/28/2023 1000 by Ramandeep Larose RN  Equipment Currently Used at Home: none     Problem: Diabetic Ketoacidosis  Goal: Optimal Coping  6/28/2023 1528 by Ramandeep Larose RN  Outcome: Progressing  6/28/2023 1527 by Ramandeep Larose RN  Outcome: Not Progressing  Goal: Fluid and Electrolyte Balance with Absence of Ketosis  6/28/2023 1528 by Ramandeep Larose RN  Outcome: Progressing  6/28/2023 1527 by Ramandeep Larose RN  Outcome: Not Progressing     Problem: Pancreatitis  Goal: Fluid and Electrolyte Balance  6/28/2023 1528 by Thuan  TIMMY Sabillon  Outcome: Progressing  6/28/2023 1527 by Ramandeep Larose RN  Outcome: Not Progressing  Goal: Absence of Infection Signs and Symptoms  6/28/2023 1528 by Ramandeep Larose RN  Outcome: Progressing  6/28/2023 1527 by Ramandeep Larose RN  Outcome: Not Progressing  Goal: Optimal Nutrition Delivery  6/28/2023 1528 by Ramandeep Larose RN  Outcome: Progressing  6/28/2023 1527 by Ramandeep Larose RN  Outcome: Not Progressing  Goal: Optimal Pain Control and Function  6/28/2023 1528 by Ramandeep Larose RN  Outcome: Progressing  6/28/2023 1527 by Ramandeep Larose RN  Outcome: Not Progressing  Goal: Effective Oxygenation and Ventilation  6/28/2023 1528 by Ramandeep Larose RN  Outcome: Progressing  6/28/2023 1527 by Ramandeep Larose RN  Outcome: Not Progressing  Intervention: Optimize Oxygenation and Ventilation  Recent Flowsheet Documentation  Taken 6/28/2023 1200 by Ramandeep Larose RN  Activity Management:   activity adjusted per tolerance   ambulated to bathroom  Head of Bed (HOB) Positioning: HOB at 20-30 degrees  Taken 6/28/2023 1000 by Ramandeep Larose RN  Activity Management:   activity adjusted per tolerance   ambulated to bathroom  Head of Bed (HOB) Positioning: HOB at 20-30 degrees  Taken 6/28/2023 0926 by Ramandeep Larose RN  Head of Bed (HOB) Positioning: HOB at 20-30 degrees     Goal Outcome Evaluation:

## 2023-06-28 NOTE — PROGRESS NOTES
Shift Summary:  Patient Alert and oriented x 4. Up with SBA. Hourly blood glucose checks.  Currently has insulin gtt infusing at 3 units/hr, using algorhythm 2.  NPO with ice chips.  VSS, hypertensive at times, MD aware.  BMP and ketones every 4 hours.  Ketones trending down.

## 2023-06-28 NOTE — ED TRIAGE NOTES
Upper abdominal/epigastric pain radiating to bilateral upper abdomin with intermittent nausea and fevers (TMax 102 this morning) x 1.5 days.      Triage Assessment     Row Name 06/28/23 0012       Triage Assessment (Adult)    Airway WDL WDL       Respiratory WDL    Respiratory WDL WDL       Skin Circulation/Temperature WDL    Skin Circulation/Temperature WDL WDL       Cardiac WDL    Cardiac WDL chest pain       Chest Pain Assessment    Chest Pain Location epigastric    Chest Pain Radiation abdomen    Character aching;squeezing    Associated Signs/Symptoms nausea    Chest Pain Intervention cardiac biomarkers drawn;12-lead ECG obtained       Peripheral/Neurovascular WDL    Peripheral Neurovascular WDL WDL       Cognitive/Neuro/Behavioral WDL    Cognitive/Neuro/Behavioral WDL WDL

## 2023-06-28 NOTE — PLAN OF CARE
"[facility]    ED Boarding Nurse Handoff Addendum Report:    Date/time: 6/28/2023, 7:02 AM    Activity Level: in bed    Fall Risk: No    Active Infusions: NS    Current Meds Due: None    Current care needs: Pain mgt    Oxygen requirements (liters/min and/or FiO2): N/A    Respiratory status: Room air    Vital signs (within last 30 minutes):    Vitals:    06/28/23 0010 06/28/23 0053 06/28/23 0330   BP: (!) 152/107 (!) 143/95 103/79   Pulse: 99 94 80   Resp: 20     Temp: 98.5  F (36.9  C) 98.8  F (37.1  C)    TempSrc: Oral Oral    SpO2: 97%  95%   Weight: 88.8 kg (195 lb 12.3 oz)     Height: 1.803 m (5' 11\")         Focused assessment within last 30 minutes:    Pt is A+O X4.  PRN dilaudid administered for pain. Pt denies any shortness of breath and on room air. Pain to abdominal area persists and area is tender to touch. IV NS infusing at 125ml/hr. Critical lab value; ketones at 4.10; MD paged awaiting response. Pt is NPO. Plan: ongoing monitoring.     0730hrs: nazario back from MD, insulin drip will be ordered.    ED Boarding Nurse name: Sheri Easley RN                        "

## 2023-06-28 NOTE — H&P
[facility]    History and Physical  Hospitalist       Date of Admission:  6/28/2023  Date of Service (when I saw the patient): 06/28/23    Assessment & Plan   Josselyn Brewster is a 42 year old male patient with past medical history of hypertension, hyperlipidemia, depression, spinal stenosis, history of cocaine dependence and polysubstance abuse, osteoarthritis, came to emergency room with complaint of abdominal pain.  Patient states that he started to have epigastric pain 2 days ago.  He states that he had associated vomiting.  He denies cough, shortness of breath, chest pain, diarrhea, urinary symptoms.  He states that he drinks mainly on the weekends and denies drinking heavily.  His last alcohol intake was on Sunday.  He denies prior history of alcohol withdrawal.    Acute pancreatitis, suspect alcohol induced  Patient admits to drinking alcohol but denies drinking heavily.  His last alcohol intake was on Sunday.  He has epigastric pain and left upper abdominal pain associated with vomiting.  CT of the abdomen/pelvis showed evidence of acute pancreatitis.  Started on IV fluid and IV pain medications in the ER.  We will continue aggressive fluid resuscitation.  We will put him on IV Dilaudid as needed for pain.  We will keep him n.p.o  -We will check fasting lipid profile    Hyperglycemia with anion gap metabolic acidosis  Ketones pending.  Patient denies diagnosis of diabetes mellitus.  Initial blood sugar was 454.  Repeat blood sugar after IV fluid was 280.  Hemoglobin A1c sent in the ER.  -We will continue IV fluid resuscitation as above.  We will repeat BMP.  We will order insulin sliding scale every 4 hours for n.p.o. status.  We will closely monitor blood sugar.    Hyponatremia  Suspect due to dehydration with decreased fluid intake in the setting of pancreatitis.  Received 2.5 L normal saline in the ER.  We will continue normal saline  -We will monitor serum sodium    History of  depression  We will resume his medication once reviewed by pharmacy    We will admit him to medical floor for as inpatient    DVT Prophylaxis: Pneumatic Compression Devices  Code Status: Full Code    Disposition: Expected discharge in 2 days     Orlin Brannon MD    Primary Care Physician   [facility]    Chief Complaint   Abdominal pain.     History is obtained from the patient    History of Present Illness   Josselyn Brewster is a 42 year old male patient with past medical history of hypertension, hyperlipidemia, depression, spinal stenosis, history of cocaine dependence and polysubstance abuse, osteoarthritis, came to emergency room with complaint of abdominal pain.  Patient states that he started to have epigastric pain 2 days ago.  He states that he had associated vomiting.  He denies cough, shortness of breath, chest pain, diarrhea, urinary symptoms.  He states that he drinks mainly on the weekends and denies drinking heavily.  His last alcohol intake was on Sunday.  He denies prior history of alcohol withdrawal.  On arrival to emergency room, his vital signs were checked and showed temperature 98.8, pulse 94, blood pressure 143/95, oxygen saturation 97% on room air.  Laboratory work-up showed sodium 127, potassium 4.9, creatinine 0.76, ALT 21, lactic acid 0.8.  Blood glucose 454.  Repeat blood sugar was 280.  CT of the abdomen/pelvis was done and showed acute pancreatitis and age/uncinate process.  There is hepatic steatosis.  Ultrasound of right upper quadrant was also done and showed normal gallbladder and common bile ducts.  There is evidence of hepatic steatosis.  In emergency room, he was given IV fluid boluses.  He was also given IV Dilaudid 0.5x2 doses, Zofran 4 mg IV.  He was admitted to the hospital for further management.      Past Medical History    I have reviewed this patient's medical history and updated it with pertinent information if needed.   Past Medical History:    Diagnosis Date     NONSPECIFIC MEDICAL HISTORY     spinal facet  disorder- chonic       Past Surgical History   I have reviewed this patient's surgical history and updated it with pertinent information if needed.  Past Surgical History:   Procedure Laterality Date     IR LUMBAR EPIDURAL STEROID INJECTION  2006     IR LUMBAR EPIDURAL STEROID INJECTION  2006     IR LUMBAR EPIDURAL STEROID INJECTION  2006     ZZC NONSPECIFIC PROCEDURE      laminectoomy         Prior to Admission Medications   Prior to Admission Medications   Prescriptions Last Dose Informant Patient Reported? Taking?   BusPIRone HCl 30 MG TABS   Yes No   Sig: Take by mouth 3 times daily   FLUoxetine (PROZAC) 40 MG capsule   Yes No   Sig: Take by mouth 2 times daily    IBUPROFEN 200 200 MG OR TABS   Yes No   Si tablets three times daily as needed   amphetamine-dextroamphetamine (ADDERALL) 20 MG tablet   Yes No   Sig: Take 20 mg by mouth 2 times daily   gabapentin (NEURONTIN) 300 MG capsule   Yes No   Sig: Take 300 mg by mouth 3 times daily   hydrochlorothiazide (HYDRODIURIL) 25 MG tablet   No No   Sig: Take 1 tablet (25 mg) by mouth daily   labetalol (NORMODYNE) 100 MG tablet   No No   Sig: Take 1 tablet (100 mg) by mouth 2 times daily   oxyCODONE (OXY-IR) 5 MG capsule   Yes No   Sig: Take 5 mg by mouth every 4 hours as needed      Facility-Administered Medications: None     Allergies   Allergies   Allergen Reactions     Levofloxacin Hives     Morphine Other (See Comments)     Other reaction(s): Urinary Retention  Other reaction(s): Urinary Retention       Social History   I have reviewed this patient's social history and updated it with pertinent information if needed. Josselyn Brewster  reports that he has never smoked. He does not have any smokeless tobacco history on file. He reports current alcohol use of about 0.8 - 1.7 standard drinks of alcohol per week.    Family History   I have reviewed this patient's family history and  updated it with pertinent information if needed.   No family history on file.    Review of Systems   The 10 point Review of Systems is negative other than noted in the HPI or here. Abdominal pain     Physical Exam   Temp: 98.8  F (37.1  C) Temp src: Oral BP: 103/79 Pulse: 80   Resp: 20 SpO2: 95 % O2 Device: None (Room air)    Vital Signs with Ranges  Temp:  [98.5  F (36.9  C)-98.8  F (37.1  C)] 98.8  F (37.1  C)  Pulse:  [80-99] 80  Resp:  [20] 20  BP: (103-152)/() 103/79  SpO2:  [95 %-97 %] 95 %  195 lbs 12.3 oz    GEN:  Alert, oriented x 3, appears comfortable, NAD.  HEENT:  Normocephalic/atraumatic, no scleral icterus, no nasal discharge, mouth moist.  CV:  Regular rate and rhythm, no murmur or JVD.  S1 + S2 noted, no S3 or S4.  LUNGS:  Clear to auscultation bilaterally without rales/rhonchi/wheezing/retractions.  Symmetric chest rise on inhalation noted.  ABD:  Active bowel sounds, Left abdominal tenderness.  No rebound/guarding/rigidity.  EXT:  No edema or cyanosis.  Hands/feet warm to touch with good signs of peripheral perfusion.  No joint synovitis noted.  SKIN:  Dry to touch, no exanthems noted in the visualized areas.  NEURO:  Symmetric muscle strength, sensation to touch grossly intact.  No new focal deficits appreciated.    Data   Data reviewed today:  I personally reviewed   Recent Labs   Lab 06/28/23  0403 06/28/23  0234 06/28/23  0230 06/28/23  0017   WBC  --   --   --  8.3   HGB  --   --   --  14.0   MCV  --   --   --  79   PLT  --   --   --  249   NA  --  127*  --   --    POTASSIUM  --  4.9  --   --    CHLORIDE  --  93*  --   --    CO2  --  16*  --   --    BUN  --  9.6  --   --    CR  --  0.76  --   --    ANIONGAP  --  18*  --   --    DEYA  --  8.2*  --   --    * 454* 388*  --    ALBUMIN  --  3.6  --   --    PROTTOTAL  --  6.4  --   --    BILITOTAL  --  0.5  --   --    ALKPHOS  --  78  --   --    ALT  --  21  --   --    LIPASE  --   --   --  140*       Recent Results (from the past 24  hour(s))   CT Abdomen Pelvis w Contrast    Narrative    EXAM: CT ABDOMEN PELVIS W CONTRAST  LOCATION: [facility]  DATE: 6/28/2023    INDICATION: right sided abdominal pain  COMPARISON: None.  TECHNIQUE: CT scan of the abdomen and pelvis was performed following injection of IV contrast. Multiplanar reformats were obtained. Dose reduction techniques were used.  CONTRAST: 100mL Isovue 370    FINDINGS:   LOWER CHEST: Normal.    HEPATOBILIARY: Steatosis.    PANCREAS: Edematous changes pancreatic head/uncinate process with adjacent stranding edema.    SPLEEN: Normal.    ADRENAL GLANDS: Normal.    KIDNEYS/BLADDER: Normal.    BOWEL: Appendix normal.    LYMPH NODES: Normal.    VASCULATURE: Atherosclerosis.    PELVIC ORGANS: Normal.    MUSCULOSKELETAL: Normal.      Impression    IMPRESSION:   1.  Acute pancreatitis head/uncinate process.  2.  Hepatic steatosis.   US Abdomen Limited (RUQ)    Narrative    EXAM: US ABDOMEN LIMITED  LOCATION: [facility]  DATE: 6/28/2023    INDICATION: pancreatitis, rule out obstruction  COMPARISON: CT abdomen and pelvis 06/28/2023.  TECHNIQUE: Limited abdominal ultrasound.    FINDINGS:    GALLBLADDER: Normal. No gallstones, wall thickening, or pericholecystic fluid. Negative sonographic Chang's sign.    BILE DUCTS: No biliary dilatation. The common duct measures 4 mm.    LIVER: Echogenic parenchyma. No focal mass.    RIGHT KIDNEY: No hydronephrosis.    PANCREAS: The visualized portions are normal.    No ascites.      Impression    IMPRESSION:  1.  Hepatic steatosis.  2.  Normal gallbladder.

## 2023-06-28 NOTE — PHARMACY-ADMISSION MEDICATION HISTORY
Pharmacist Admission Medication History    Admission medication history is complete. The information provided in this note is only as accurate as the sources available at the time of the update.    Medication reconciliation/reorder completed by provider prior to medication history? No    Information Source(s): Patient and CareEverywhere/SureScripts via in-person    Pertinent Information: Hasn't filled BP meds at the pharmacy in >year, states takes BP meds (hydrochlorothiazide and labetalol) a couple times a month. Still has some at home but uses very sporadically. RX now  for both. They are listed on PTA med list below for reference as patient likely should still be taking regularly.       Changes made to PTA medication list:    Added: citalopram, apap    Deleted: oxycodone, adderall, buspar, prozac, gabapentin     Changed: ibuprofen dose/sig    Medication Affordability:  Not including over the counter (OTC) medications, was there a time in the past 3 months when you did not take your medications as prescribed because of cost?: No    Allergies reviewed with patient and updates made in EHR: yes    Medication History Completed By: Amy Haddad RPH 2023 9:24 AM    Prior to Admission medications    Medication Sig Last Dose Taking? Auth Provider Long Term End Date   acetaminophen (TYLENOL) 500 MG tablet Take 500-1,000 mg by mouth every 6 hours as needed for mild pain Past Month Yes Unknown, Entered By History     citalopram (CELEXA) 40 MG tablet Take 40 mg by mouth daily 2023 at am Yes Unknown, Entered By History Yes    hydrochlorothiazide (HYDRODIURIL) 25 MG tablet Take 25 mg by mouth daily More than a month Yes Unknown, Entered By History No    ibuprofen (ADVIL/MOTRIN) 200 MG tablet Take 200-600 mg by mouth every 4 hours as needed for pain Past Month Yes Unknown, Entered By History     labetalol (NORMODYNE) 100 MG tablet Take 1 tablet (100 mg) by mouth 2 times daily More than a month Yes Armond  Will Ward MD Yes

## 2023-06-28 NOTE — PLAN OF CARE
Patient A&Ox4. Insulin gtt at 3 units/hour on alg 2. Ice chips for diet. Rates pain 4 to 5/10/ BMP and ketones q 4 hrs.

## 2023-06-28 NOTE — ED PROVIDER NOTES
"  History     Chief Complaint:  Fever and Abdominal Pain       The history is provided by the patient.      Josselyn Brewster is a 42 year old male with history of osteoarthritis and right-side lower back pain who presents to the ED via car for evaluation of fevers and abdominal pain. Patient reports upper, primarily right-sided, abdominal pain began 1.5 days ago and notes pain is just under his ribcage. He states pain is 5/10 currently. He endorses fevers as well, one this morning measuring 102. He notes only eating chicken noodle soup the last 2 days. He denies changes in bowel movements, abdominal pain, or a history of abdominal surgeries.    Independent Historian:   None - Patient Only    Medications:    Norvasc  Cozaar  Adderall  Buspirone  Prozac  Neurontin  Hydrodiuril  Normodyne  Senokot  Zanaflex  Celexa  Lioresal    Past Medical History:    Hyperlipidemia  Hypertension  Depression  Osteoarthritis  Eczema  Spinal stenosis of lumbar region with neurogenic claudication  Acute right-sided low back pain with right-sided sciatica  Lumbar herniated disc  History of cocaine dependence  History of polysubstance abuse    Past Surgical History:    Spine surgery x4  Hernia repair    Physical Exam     Patient Vitals for the past 24 hrs:   BP Temp Temp src Pulse Resp SpO2 Height Weight   06/28/23 0330 103/79 -- -- 80 -- 95 % -- --   06/28/23 0053 (!) 143/95 98.8  F (37.1  C) Oral 94 -- -- -- --   06/28/23 0010 (!) 152/107 98.5  F (36.9  C) Oral 99 20 97 % 1.803 m (5' 11\") 88.8 kg (195 lb 12.3 oz)     Physical Exam  General: Patient is awake, alert and interactive when I enter the room. Appears uncomfortable.   Head: The scalp, face, and head appear normal  Eyes: Conjunctivae and sclerae are normal  Neck: Normal range of motion.   CV: Regular rate.   Resp:  No respiratory distress.   GI: epigastric tenderness but abdomen is soft, no rigidity. No evidence of pulsatile mass. No fluid waves or evidence of ascites. No " distension. No hernias or bruising are noted in detailed exam. No CVA tenderness.    MS: Normal tone.   Skin: Normal capillary refill noted  Neuro: Speech is normal and fluent. Face is symmetric. Moving all extremities.   Psych:  Normal affect.  Appropriate interactions.    Emergency Department Course   ECG  Sinus rhythm  Normal ECG  No change as compared to prior, dated 6/29/2021.  Rate 90 bpm. HI interval 158 ms. QRS duration 102 ms. QT/QTc 374/457 ms. P-R-T axes 33 43 66.     Imaging:  US Abdomen Limited (RUQ)   Final Result   IMPRESSION:   1.  Hepatic steatosis.   2.  Normal gallbladder.            CT Abdomen Pelvis w Contrast   Final Result   IMPRESSION:    1.  Acute pancreatitis head/uncinate process.   2.  Hepatic steatosis.         Report per radiology    Laboratory:  Labs Ordered and Resulted from Time of ED Arrival to Time of ED Departure   COMPREHENSIVE METABOLIC PANEL - Abnormal       Result Value    Sodium 127 (*)     Potassium 4.9      Chloride 93 (*)     Carbon Dioxide (CO2) 16 (*)     Anion Gap 18 (*)     Urea Nitrogen 9.6      Creatinine 0.76      Calcium 8.2 (*)     Glucose 454 (*)     Alkaline Phosphatase 78      AST        ALT 21      Protein Total 6.4      Albumin 3.6      Bilirubin Total 0.5      GFR Estimate >90     LIPASE - Abnormal    Lipase 140 (*)    CBC WITH PLATELETS AND DIFFERENTIAL - Abnormal    WBC Count 8.3      RBC Count 4.89      Hemoglobin 14.0      Hematocrit 38.7 (*)     MCV 79      MCH 28.6      MCHC 36.2      RDW 14.0      Platelet Count 249      % Neutrophils 77      % Lymphocytes 14      % Monocytes 7      % Eosinophils 1      % Basophils 0      % Immature Granulocytes 1      NRBCs per 100 WBC 0      Absolute Neutrophils 6.4      Absolute Lymphocytes 1.2      Absolute Monocytes 0.6      Absolute Eosinophils 0.1      Absolute Basophils 0.0      Absolute Immature Granulocytes 0.0      Absolute NRBCs 0.0     GLUCOSE BY METER - Abnormal    GLUCOSE BY METER POCT 388 (*)     GLUCOSE BY METER - Abnormal    GLUCOSE BY METER POCT 280 (*)    TROPONIN T, HIGH SENSITIVITY - Normal    Troponin T, High Sensitivity 12     BILIRUBIN DIRECT - Normal    Bilirubin Direct <0.20     LACTIC ACID WHOLE BLOOD - Normal    Lactic Acid 0.8     LIPID REFLEX TO DIRECT LDL PANEL   KETONE BETA-HYDROXYBUTYRATE QUANTITATIVE, RAPID   HEMOGLOBIN A1C   BASIC METABOLIC PANEL       Emergency Department Course & Assessments:    Interventions:  Medications   ondansetron (ZOFRAN) injection 4 mg (4 mg Intravenous $Given 6/28/23 0126)   HYDROmorphone (PF) (DILAUDID) injection 0.5 mg (0.5 mg Intravenous $Given 6/28/23 0342)   0.9% sodium chloride BOLUS (has no administration in time range)   0.9% sodium chloride BOLUS (0 mLs Intravenous Stopped 6/28/23 0343)   HYDROmorphone (PF) (DILAUDID) injection 0.5 mg (0.5 mg Intravenous $Given 6/28/23 0126)   iopamidol (ISOVUE-370) solution 500 mL (100 mLs Intravenous $Given 6/28/23 0204)   sodium chloride (PF) 0.9% PF flush 100 mL (65 mLs Intravenous $Given 6/28/23 0204)      Assessments:  0105 I obtained history and examined the patient as noted above.  0259 I rechecked the patient and explained findings.      Consultations/Discussion of Management or Tests:  0431 I spoke with Dr. Salud MD, of the hospitalist team regarding the patient, who accepted the patient for admission.    Social Determinants of Health affecting care:   None    Disposition:  The patient was admitted to the hospital under the care of Dr. Brannon.     Impression & Plan    CMS Diagnoses: None    Medical Decision Making:  Josselyn Brewster is a 42 year old male who presents with epigastric abdominal pain.  The differential diagnosis would include GERD, GIB, esophageal spasm, atypical cardiac sx's, pancreatitis, biliary colic or gallstone disease, AAA, gastroenteritis, gastritis, large vs small bowel disease, etc.  Based on history, PE and labs, the most likely explanation is pancreatitis.  Ultrasound of  gallbladder shows no evidence of cholecystitis or cholelithiasis.  Unclear cause of his pancreatitis at this juncture.  Patient only seldomly drinks alcohol.  Patient is on hydrochlorothiazide which can sometimes cause medication induced pancreatitis.  We will check triglycerides.  Patient has ongoing pain.  Will admit for further pain control and evaluation of possible causes for his pancreatitis.  Blood work revealed evidence of metabolic anion gap acidosis likely from his pancreatitis.  Will be rehydrated and will recheck blood work.  No history of diabetes but patient is significantly hyperglycemic at this juncture.  This improved with IV fluids.  Hemoglobin A1c was obtained and is currently pending.    Diagnosis:    ICD-10-CM    1. Acute pancreatitis without infection or necrosis, unspecified pancreatitis type  K85.90                Scribe Disclosure:  Brandon MARCUS Hailie, am serving as a scribe at 2:55 AM on 6/28/2023 to document services personally performed by Will Leahy MD based on my observations and the provider's statements to me.   6/28/2023   Will Leahy MD Battista, Christopher Joseph, MD  06/28/23 0638

## 2023-06-29 ENCOUNTER — APPOINTMENT (OUTPATIENT)
Dept: GENERAL RADIOLOGY | Facility: CLINIC | Age: 43
End: 2023-06-29
Attending: INTERNAL MEDICINE
Payer: COMMERCIAL

## 2023-06-29 LAB
ALBUMIN UR-MCNC: NEGATIVE MG/DL
ANION GAP SERPL CALCULATED.3IONS-SCNC: 10 MMOL/L (ref 7–15)
ANION GAP SERPL CALCULATED.3IONS-SCNC: 10 MMOL/L (ref 7–15)
ANION GAP SERPL CALCULATED.3IONS-SCNC: 11 MMOL/L (ref 7–15)
ANION GAP SERPL CALCULATED.3IONS-SCNC: 15 MMOL/L (ref 7–15)
APPEARANCE UR: CLEAR
B-OH-BUTYR SERPL-SCNC: 0.3 MMOL/L
B-OH-BUTYR SERPL-SCNC: 1 MMOL/L
B-OH-BUTYR SERPL-SCNC: 1.2 MMOL/L
B-OH-BUTYR SERPL-SCNC: 1.6 MMOL/L
B-OH-BUTYR SERPL-SCNC: 2.2 MMOL/L
BILIRUB UR QL STRIP: NEGATIVE
BUN SERPL-MCNC: 2.4 MG/DL (ref 6–20)
BUN SERPL-MCNC: 3.6 MG/DL (ref 6–20)
BUN SERPL-MCNC: 3.7 MG/DL (ref 6–20)
BUN SERPL-MCNC: 3.8 MG/DL (ref 6–20)
CA-I BLD-MCNC: 3.5 MG/DL (ref 4.4–5.2)
CALCIUM SERPL-MCNC: 8.4 MG/DL (ref 8.6–10)
CALCIUM SERPL-MCNC: 8.5 MG/DL (ref 8.6–10)
CHLORIDE SERPL-SCNC: 97 MMOL/L (ref 98–107)
CHLORIDE SERPL-SCNC: 98 MMOL/L (ref 98–107)
COLOR UR AUTO: ABNORMAL
CREAT SERPL-MCNC: 0.73 MG/DL (ref 0.67–1.17)
CREAT SERPL-MCNC: 0.77 MG/DL (ref 0.67–1.17)
CREAT SERPL-MCNC: 0.78 MG/DL (ref 0.67–1.17)
CREAT SERPL-MCNC: 0.78 MG/DL (ref 0.67–1.17)
DEPRECATED HCO3 PLAS-SCNC: 17 MMOL/L (ref 22–29)
DEPRECATED HCO3 PLAS-SCNC: 20 MMOL/L (ref 22–29)
DEPRECATED HCO3 PLAS-SCNC: 22 MMOL/L (ref 22–29)
DEPRECATED HCO3 PLAS-SCNC: 23 MMOL/L (ref 22–29)
GFR SERPL CREATININE-BSD FRML MDRD: >90 ML/MIN/1.73M2
GLUCOSE BLDC GLUCOMTR-MCNC: 117 MG/DL (ref 70–99)
GLUCOSE BLDC GLUCOMTR-MCNC: 138 MG/DL (ref 70–99)
GLUCOSE BLDC GLUCOMTR-MCNC: 139 MG/DL (ref 70–99)
GLUCOSE BLDC GLUCOMTR-MCNC: 152 MG/DL (ref 70–99)
GLUCOSE BLDC GLUCOMTR-MCNC: 156 MG/DL (ref 70–99)
GLUCOSE BLDC GLUCOMTR-MCNC: 160 MG/DL (ref 70–99)
GLUCOSE BLDC GLUCOMTR-MCNC: 165 MG/DL (ref 70–99)
GLUCOSE BLDC GLUCOMTR-MCNC: 174 MG/DL (ref 70–99)
GLUCOSE BLDC GLUCOMTR-MCNC: 182 MG/DL (ref 70–99)
GLUCOSE BLDC GLUCOMTR-MCNC: 184 MG/DL (ref 70–99)
GLUCOSE BLDC GLUCOMTR-MCNC: 187 MG/DL (ref 70–99)
GLUCOSE BLDC GLUCOMTR-MCNC: 190 MG/DL (ref 70–99)
GLUCOSE BLDC GLUCOMTR-MCNC: 190 MG/DL (ref 70–99)
GLUCOSE BLDC GLUCOMTR-MCNC: 193 MG/DL (ref 70–99)
GLUCOSE BLDC GLUCOMTR-MCNC: 194 MG/DL (ref 70–99)
GLUCOSE BLDC GLUCOMTR-MCNC: 194 MG/DL (ref 70–99)
GLUCOSE BLDC GLUCOMTR-MCNC: 195 MG/DL (ref 70–99)
GLUCOSE BLDC GLUCOMTR-MCNC: 197 MG/DL (ref 70–99)
GLUCOSE BLDC GLUCOMTR-MCNC: 197 MG/DL (ref 70–99)
GLUCOSE BLDC GLUCOMTR-MCNC: 202 MG/DL (ref 70–99)
GLUCOSE BLDC GLUCOMTR-MCNC: 218 MG/DL (ref 70–99)
GLUCOSE BLDC GLUCOMTR-MCNC: 219 MG/DL (ref 70–99)
GLUCOSE BLDC GLUCOMTR-MCNC: 222 MG/DL (ref 70–99)
GLUCOSE BLDC GLUCOMTR-MCNC: 236 MG/DL (ref 70–99)
GLUCOSE BLDC GLUCOMTR-MCNC: 239 MG/DL (ref 70–99)
GLUCOSE BLDC GLUCOMTR-MCNC: 244 MG/DL (ref 70–99)
GLUCOSE SERPL-MCNC: 122 MG/DL (ref 70–99)
GLUCOSE SERPL-MCNC: 162 MG/DL (ref 70–99)
GLUCOSE SERPL-MCNC: 185 MG/DL (ref 70–99)
GLUCOSE SERPL-MCNC: 201 MG/DL (ref 70–99)
GLUCOSE UR STRIP-MCNC: >=1000 MG/DL
HGB UR QL STRIP: NEGATIVE
KETONES UR STRIP-MCNC: 10 MG/DL
LEUKOCYTE ESTERASE UR QL STRIP: NEGATIVE
MAGNESIUM SERPL-MCNC: 1.5 MG/DL (ref 1.7–2.3)
MAGNESIUM SERPL-MCNC: 1.8 MG/DL (ref 1.7–2.3)
MUCOUS THREADS #/AREA URNS LPF: PRESENT /LPF
NITRATE UR QL: NEGATIVE
PH UR STRIP: 5.5 [PH] (ref 5–7)
POTASSIUM SERPL-SCNC: 3.3 MMOL/L (ref 3.4–5.3)
POTASSIUM SERPL-SCNC: 3.3 MMOL/L (ref 3.4–5.3)
POTASSIUM SERPL-SCNC: 3.4 MMOL/L (ref 3.4–5.3)
POTASSIUM SERPL-SCNC: 3.5 MMOL/L (ref 3.4–5.3)
POTASSIUM SERPL-SCNC: 3.8 MMOL/L (ref 3.4–5.3)
POTASSIUM SERPL-SCNC: >10 MMOL/L (ref 3.4–5.3)
RBC URINE: <1 /HPF
SODIUM SERPL-SCNC: 127 MMOL/L (ref 136–145)
SODIUM SERPL-SCNC: 130 MMOL/L (ref 136–145)
SP GR UR STRIP: 1.01 (ref 1–1.03)
UROBILINOGEN UR STRIP-MCNC: NORMAL MG/DL
WBC URINE: <1 /HPF

## 2023-06-29 PROCEDURE — 84132 ASSAY OF SERUM POTASSIUM: CPT | Performed by: INTERNAL MEDICINE

## 2023-06-29 PROCEDURE — 82010 KETONE BODYS QUAN: CPT | Performed by: INTERNAL MEDICINE

## 2023-06-29 PROCEDURE — 83735 ASSAY OF MAGNESIUM: CPT | Performed by: INTERNAL MEDICINE

## 2023-06-29 PROCEDURE — 36415 COLL VENOUS BLD VENIPUNCTURE: CPT | Performed by: INTERNAL MEDICINE

## 2023-06-29 PROCEDURE — 250N000011 HC RX IP 250 OP 636: Mod: JZ | Performed by: INTERNAL MEDICINE

## 2023-06-29 PROCEDURE — 36415 COLL VENOUS BLD VENIPUNCTURE: CPT | Performed by: ANESTHESIOLOGY

## 2023-06-29 PROCEDURE — 250N000011 HC RX IP 250 OP 636: Performed by: INTERNAL MEDICINE

## 2023-06-29 PROCEDURE — 82330 ASSAY OF CALCIUM: CPT | Performed by: INTERNAL MEDICINE

## 2023-06-29 PROCEDURE — 99233 SBSQ HOSP IP/OBS HIGH 50: CPT | Performed by: INTERNAL MEDICINE

## 2023-06-29 PROCEDURE — 83735 ASSAY OF MAGNESIUM: CPT | Performed by: HOSPITALIST

## 2023-06-29 PROCEDURE — 250N000013 HC RX MED GY IP 250 OP 250 PS 637: Performed by: INTERNAL MEDICINE

## 2023-06-29 PROCEDURE — 84478 ASSAY OF TRIGLYCERIDES: CPT | Performed by: INTERNAL MEDICINE

## 2023-06-29 PROCEDURE — HZ2ZZZZ DETOXIFICATION SERVICES FOR SUBSTANCE ABUSE TREATMENT: ICD-10-PCS | Performed by: INTERNAL MEDICINE

## 2023-06-29 PROCEDURE — 71045 X-RAY EXAM CHEST 1 VIEW: CPT

## 2023-06-29 PROCEDURE — 87040 BLOOD CULTURE FOR BACTERIA: CPT | Performed by: INTERNAL MEDICINE

## 2023-06-29 PROCEDURE — 81001 URINALYSIS AUTO W/SCOPE: CPT | Performed by: INTERNAL MEDICINE

## 2023-06-29 PROCEDURE — 250N000009 HC RX 250: Performed by: INTERNAL MEDICINE

## 2023-06-29 PROCEDURE — 82310 ASSAY OF CALCIUM: CPT | Performed by: INTERNAL MEDICINE

## 2023-06-29 PROCEDURE — 200N000001 HC R&B ICU

## 2023-06-29 PROCEDURE — 80048 BASIC METABOLIC PNL TOTAL CA: CPT | Performed by: INTERNAL MEDICINE

## 2023-06-29 PROCEDURE — 84132 ASSAY OF SERUM POTASSIUM: CPT | Performed by: ANESTHESIOLOGY

## 2023-06-29 RX ORDER — FLUMAZENIL 0.1 MG/ML
0.2 INJECTION, SOLUTION INTRAVENOUS
Status: DISCONTINUED | OUTPATIENT
Start: 2023-06-29 | End: 2023-07-03 | Stop reason: HOSPADM

## 2023-06-29 RX ORDER — HALOPERIDOL 5 MG/ML
2.5-5 INJECTION INTRAMUSCULAR EVERY 6 HOURS PRN
Status: DISCONTINUED | OUTPATIENT
Start: 2023-06-29 | End: 2023-07-03 | Stop reason: HOSPADM

## 2023-06-29 RX ORDER — LORAZEPAM 2 MG/ML
1-2 INJECTION INTRAMUSCULAR EVERY 30 MIN PRN
Status: DISCONTINUED | OUTPATIENT
Start: 2023-06-29 | End: 2023-07-02

## 2023-06-29 RX ORDER — ACETAMINOPHEN 325 MG/1
650 TABLET ORAL EVERY 4 HOURS PRN
Status: DISCONTINUED | OUTPATIENT
Start: 2023-06-29 | End: 2023-07-03 | Stop reason: HOSPADM

## 2023-06-29 RX ORDER — CALCIUM GLUCONATE 20 MG/ML
1 INJECTION, SOLUTION INTRAVENOUS ONCE
Status: COMPLETED | OUTPATIENT
Start: 2023-06-29 | End: 2023-06-30

## 2023-06-29 RX ORDER — NALOXONE HYDROCHLORIDE 0.4 MG/ML
0.4 INJECTION, SOLUTION INTRAMUSCULAR; INTRAVENOUS; SUBCUTANEOUS
Status: DISCONTINUED | OUTPATIENT
Start: 2023-06-29 | End: 2023-07-03 | Stop reason: HOSPADM

## 2023-06-29 RX ORDER — ACETAMINOPHEN 650 MG/1
650 SUPPOSITORY RECTAL EVERY 4 HOURS PRN
Status: DISCONTINUED | OUTPATIENT
Start: 2023-06-29 | End: 2023-07-03 | Stop reason: HOSPADM

## 2023-06-29 RX ORDER — NALOXONE HYDROCHLORIDE 0.4 MG/ML
0.2 INJECTION, SOLUTION INTRAMUSCULAR; INTRAVENOUS; SUBCUTANEOUS
Status: DISCONTINUED | OUTPATIENT
Start: 2023-06-29 | End: 2023-07-03 | Stop reason: HOSPADM

## 2023-06-29 RX ORDER — LORAZEPAM 1 MG/1
1-2 TABLET ORAL EVERY 30 MIN PRN
Status: DISCONTINUED | OUTPATIENT
Start: 2023-06-29 | End: 2023-07-02

## 2023-06-29 RX ORDER — FOLIC ACID 1 MG/1
1 TABLET ORAL DAILY
Status: DISCONTINUED | OUTPATIENT
Start: 2023-06-29 | End: 2023-07-03 | Stop reason: HOSPADM

## 2023-06-29 RX ORDER — OLANZAPINE 5 MG/1
5-10 TABLET, ORALLY DISINTEGRATING ORAL EVERY 6 HOURS PRN
Status: DISCONTINUED | OUTPATIENT
Start: 2023-06-29 | End: 2023-07-03 | Stop reason: HOSPADM

## 2023-06-29 RX ORDER — HYDROMORPHONE HYDROCHLORIDE 2 MG/1
4 TABLET ORAL
Status: DISCONTINUED | OUTPATIENT
Start: 2023-06-29 | End: 2023-07-03 | Stop reason: HOSPADM

## 2023-06-29 RX ORDER — HYDROXYZINE HYDROCHLORIDE 25 MG/1
25 TABLET, FILM COATED ORAL EVERY 6 HOURS PRN
Status: DISCONTINUED | OUTPATIENT
Start: 2023-06-29 | End: 2023-07-03 | Stop reason: HOSPADM

## 2023-06-29 RX ORDER — POTASSIUM CHLORIDE 1.5 G/1.58G
40 POWDER, FOR SOLUTION ORAL ONCE
Status: COMPLETED | OUTPATIENT
Start: 2023-06-29 | End: 2023-06-29

## 2023-06-29 RX ORDER — POTASSIUM CHLORIDE 7.45 MG/ML
10 INJECTION INTRAVENOUS
Status: COMPLETED | OUTPATIENT
Start: 2023-06-29 | End: 2023-06-29

## 2023-06-29 RX ORDER — MULTIVITAMIN,THERAPEUTIC
1 TABLET ORAL DAILY
Status: DISCONTINUED | OUTPATIENT
Start: 2023-06-29 | End: 2023-07-03 | Stop reason: HOSPADM

## 2023-06-29 RX ORDER — MAGNESIUM SULFATE HEPTAHYDRATE 40 MG/ML
4 INJECTION, SOLUTION INTRAVENOUS ONCE
Status: COMPLETED | OUTPATIENT
Start: 2023-06-29 | End: 2023-06-29

## 2023-06-29 RX ORDER — HYDROMORPHONE HYDROCHLORIDE 2 MG/1
2 TABLET ORAL
Status: DISCONTINUED | OUTPATIENT
Start: 2023-06-29 | End: 2023-07-03 | Stop reason: HOSPADM

## 2023-06-29 RX ADMIN — THIAMINE HCL TAB 100 MG 100 MG: 100 TAB at 16:35

## 2023-06-29 RX ADMIN — Medication 1 UNITS/HR: at 04:46

## 2023-06-29 RX ADMIN — FOLIC ACID 1 MG: 1 TABLET ORAL at 16:36

## 2023-06-29 RX ADMIN — HYDROMORPHONE HYDROCHLORIDE 1 MG: 1 INJECTION, SOLUTION INTRAMUSCULAR; INTRAVENOUS; SUBCUTANEOUS at 04:39

## 2023-06-29 RX ADMIN — POTASSIUM CHLORIDE: 2 INJECTION, SOLUTION, CONCENTRATE INTRAVENOUS at 05:47

## 2023-06-29 RX ADMIN — POTASSIUM CHLORIDE FOR ORAL SOLUTION 40 MEQ: 1.5 POWDER, FOR SOLUTION ORAL at 23:00

## 2023-06-29 RX ADMIN — POTASSIUM CHLORIDE 10 MEQ: 7.46 INJECTION, SOLUTION INTRAVENOUS at 13:55

## 2023-06-29 RX ADMIN — HYDROMORPHONE HYDROCHLORIDE 0.5 MG: 1 INJECTION, SOLUTION INTRAMUSCULAR; INTRAVENOUS; SUBCUTANEOUS at 17:58

## 2023-06-29 RX ADMIN — CALCIUM GLUCONATE 1 G: 20 INJECTION, SOLUTION INTRAVENOUS at 23:25

## 2023-06-29 RX ADMIN — HYDROMORPHONE HYDROCHLORIDE 2 MG: 2 TABLET ORAL at 20:46

## 2023-06-29 RX ADMIN — Medication 1.5 UNITS/HR: at 23:01

## 2023-06-29 RX ADMIN — ONDANSETRON 4 MG: 2 INJECTION INTRAMUSCULAR; INTRAVENOUS at 14:00

## 2023-06-29 RX ADMIN — ENOXAPARIN SODIUM 40 MG: 40 INJECTION SUBCUTANEOUS at 20:47

## 2023-06-29 RX ADMIN — MAGNESIUM SULFATE 4 G: 4 INJECTION INTRAVENOUS at 09:41

## 2023-06-29 RX ADMIN — Medication 4 UNITS/HR: at 17:06

## 2023-06-29 RX ADMIN — HYDROMORPHONE HYDROCHLORIDE 4 MG: 2 TABLET ORAL at 23:51

## 2023-06-29 RX ADMIN — POTASSIUM CHLORIDE 10 MEQ: 7.46 INJECTION, SOLUTION INTRAVENOUS at 09:56

## 2023-06-29 RX ADMIN — HYDROMORPHONE HYDROCHLORIDE 1 MG: 1 INJECTION, SOLUTION INTRAMUSCULAR; INTRAVENOUS; SUBCUTANEOUS at 00:08

## 2023-06-29 RX ADMIN — HYDROMORPHONE HYDROCHLORIDE 1 MG: 1 INJECTION, SOLUTION INTRAMUSCULAR; INTRAVENOUS; SUBCUTANEOUS at 02:32

## 2023-06-29 RX ADMIN — HYDROMORPHONE HYDROCHLORIDE 2 MG: 2 TABLET ORAL at 16:36

## 2023-06-29 RX ADMIN — HYDROMORPHONE HYDROCHLORIDE 1 MG: 1 INJECTION, SOLUTION INTRAMUSCULAR; INTRAVENOUS; SUBCUTANEOUS at 08:50

## 2023-06-29 RX ADMIN — POTASSIUM CHLORIDE 10 MEQ: 7.46 INJECTION, SOLUTION INTRAVENOUS at 10:57

## 2023-06-29 RX ADMIN — HYDROMORPHONE HYDROCHLORIDE 0.5 MG: 1 INJECTION, SOLUTION INTRAMUSCULAR; INTRAVENOUS; SUBCUTANEOUS at 21:42

## 2023-06-29 RX ADMIN — HYDROMORPHONE HYDROCHLORIDE 2 MG: 2 TABLET ORAL at 11:23

## 2023-06-29 RX ADMIN — POTASSIUM CHLORIDE 10 MEQ: 7.46 INJECTION, SOLUTION INTRAVENOUS at 12:26

## 2023-06-29 RX ADMIN — THERA TABS 1 TABLET: TAB at 16:35

## 2023-06-29 RX ADMIN — ONDANSETRON 4 MG: 2 INJECTION INTRAMUSCULAR; INTRAVENOUS at 23:26

## 2023-06-29 ASSESSMENT — ACTIVITIES OF DAILY LIVING (ADL)
ADLS_ACUITY_SCORE: 18
ADLS_ACUITY_SCORE: 18
ADLS_ACUITY_SCORE: 20
ADLS_ACUITY_SCORE: 18
ADLS_ACUITY_SCORE: 22
ADLS_ACUITY_SCORE: 22
ADLS_ACUITY_SCORE: 18
ADLS_ACUITY_SCORE: 18
ADLS_ACUITY_SCORE: 22
ADLS_ACUITY_SCORE: 18

## 2023-06-29 NOTE — PROGRESS NOTES
[facility]    Medicine Progress Note - Hospitalist Service    Date of Admission:  6/28/2023    Assessment & Plan     Josselyn Brewster is a 42 year old male patient with past medical history of hypertension, hyperlipidemia, depression, spinal stenosis, history of cocaine dependence and polysubstance abuse, osteoarthritis, came to emergency room with complaint of abdominal pain.  Found to have acute pancreatitis, new diagnosis diabetes mellitus, and severe hypertriglyceridemia.    Acute pancreatitis.  -Possible alcohol induced versus due to severe hypertriglyceridemia.  -Gastroenterology consult appreciated.  -Continue insulin drip.  -Allow clear liquid diet.  -Continuous IV fluids.  -Recheck triglycerides every 12 hours.    New onset diabetes mellitus.  Diabetic ketoacidosis.  -Continue insulin drip today.  -Decrease frequency of ketones and metabolic panel monitoring.  -Continue IV fluids.    Hypokalemia.  Hypomagnesemia.  Hypocalcemia.  Hyponatremia.  -Continue IV fluids with potassium chloride added.  -Additional potassium replacement protocol as needed.  -Magnesium replacement protocol.  -Check ionized calcium.    Alcohol use disorder.  -Not currently in obvious withdrawals.  -At risk for withdrawals.  -Would benefit from alcohol cessation or at least reduction.  -Start folic acid 1 mg a day, multivitamin, thiamine 100 mg a day.  -CIWA protocol with benzodiazepines as needed.    Previous fevers.  -Currently resolved.  -Possibly due to pancreatitis.  -Check 2 blood cultures with next lab check.  -Check chest x-ray.  -Check urinalysis.  -Monitor for recurrence.                 Diet: Clear Liquid Diet    DVT Prophylaxis: Enoxaparin (Lovenox) SQ  Franz Catheter: Not present  Lines: None     Cardiac Monitoring: ACTIVE order. Indication: DKA  Code Status: Full Code      Clinically Significant Risk Factors        # Hypokalemia: Lowest K = 3.3 mmol/L in last 2 days, will replace as needed  #  "Hyponatremia: Lowest Na = 127 mmol/L in last 2 days, will monitor as appropriate  # Hypocalcemia: Lowest Ca = 8.1 mg/dL in last 2 days, will monitor and replace as appropriate   # Hypomagnesemia: Lowest Mg = 1.5 mg/dL in last 2 days, will replace as needed             # DMII: A1C = 13.6 % (Ref range: <5.7 %) within past 6 months, PRESENT ON ADMISSION  # Overweight: Estimated body mass index is 27.98 kg/m  as calculated from the following:    Height as of this encounter: 1.803 m (5' 11\").    Weight as of this encounter: 91 kg (200 lb 9.9 oz)., PRESENT ON ADMISSION          Disposition Plan     Expected Discharge Date: 06/30/2023      Destination: home            Jb Carcamo DO  Hospitalist Service  [facility]  Securely message with [employer] (more info)  Text page via [employer] Paging/Directory   ______________________________________________________________________    Interval History   Continues having abdominal pain and nausea.  Denies chest pain, shortness of breath, fevers, chills.    Physical Exam   Vital Signs: Temp: 98.3  F (36.8  C) Temp src: Oral BP: 133/87 Pulse: 83   Resp: 18 SpO2: 96 % O2 Device: None (Room air)    Weight: 200 lbs 9.9 oz    Gen:  NAD, A&Ox3.  Eyes:  PERRL, sclera anicteric.  OP:  MMM, no lesions.  Neck:  Supple.  CV:  Regular, no murmurs.  Lung:  CTA b/l, normal effort.  Ab:  +BS, soft.  Skin:  Warm, dry to touch.  No rash.  Ext:  No pitting edema LE b/l.      Medical Decision Making       45 MINUTES SPENT BY ME on the date of service doing chart review, history, exam, documentation & further activities per the note.      Data     I have personally reviewed the following data over the past 24 hrs:    N/A  \   N/A   / N/A     127 (L) 97 (L) 3.7 (L) /  219 (H)   3.3 (L) 20 (L) 0.77 \       Imaging results reviewed over the past 24 hrs:   No results found for this or any previous visit (from the past 24 hour(s)).  "

## 2023-06-29 NOTE — PLAN OF CARE
Goal Outcome Evaluation:      Plan of Care Reviewed With: patient    Overall Patient Progress: no changeOverall Patient Progress: no change         ICU End of Shift Summary.  For vital signs and complete assessments, please see documentation flowsheets.     Pertinent assessments: Alert and oriented. LS clear on RA. SR on on tele, persistent hypertension with plan to start home meds today. Remains on insulin drip with BS ranging from 130's to 190's. Elevated ketones at 2.2.   Major Shift Events: Persistent hypertension. Remain on insulin gtt. Ketones elevated and MD aware.   Plan (Upcoming Events): Address hypertension with home meds, continue to titrate insulin gtt.   Discharge/Transfer Needs: TBD    Bedside Shift Report Completed : Y  Bedside Safety Check Completed: Y

## 2023-06-29 NOTE — UTILIZATION REVIEW
Admission Status; Secondary Review Determination    Under the authority of the Utilization Management Committee, the utilization review process indicated a secondary review on the above patient. The review outcome is based on review of the medical records, discussions with staff, and applying clinical experience noted on the date of the review.    (x) Inpatient Status Appropriate - This patient's medical care is consistent with medical management for inpatient care and reasonable inpatient medical practice.    RATIONALE FOR DETERMINATION: 42-year-old male with history of  hypertension, depression, spinal stenosis of the lumbar region with neurogenic claudication, history of polysubstance abuse including cocaine dependence who presented to the hospital with significant right-sided lower back pain, abdominal pain and fever up to 102 degrees.  Patient found to have most likely alcohol induced acute pancreatitis with severe hypertriglyceridemia and newly diagnosed diabetes mellitus with significant ketonemia requiring insulin infusion.  The severity of patient's acute pancreatitis with need for frequent intravenous narcotics for pain control as well as need to manage patient's newly diagnosed diabetes mellitus with ketoacidosis appropriate for inpatient management.    At the time of admission with the information available to the attending physician more than 2 nights Hospital complex care was anticipated, based on patient risk of adverse outcome if treated as outpatient and complex care required. Inpatient admission is appropriate based on the Medicare guidelines.    This document was produced using voice recognition software    The information on this document is developed by the utilization review team in order for the business office to ensure compliance. This only denotes the appropriateness of proper admission status and does not reflect the quality of care rendered.    The definitions of Inpatient Status and  Observation Status used in making the determination above are those provided in the CMS Coverage Manual, Chapter 1 and Chapter 6, section 70.4.    Sincerely,    Stanford Hall MD  Utilization Review  Physician Advisor  [facility].

## 2023-06-29 NOTE — PROGRESS NOTES
Cross Cover    Called for ketones 2.6.  Here with acute pancreatitis likely etoh induced.  Noted to have hyperglycemia with AGMA so initiated on insulin gtt.      Remains on insulin gtt and D5  Still with some abdominal pain but no n/v    Cont above cares, started clears   Dejuan ketones in 4 hours

## 2023-06-29 NOTE — CONSULTS
GASTROENTEROLOGY CONSULTATION      Josselyn Brewster  [address]   [facility] [address]  42 year old male     Admission Date/Time: 6/28/2023  Primary Care Provider: Clinic, [facility]  Referring / Attending Physician:  Dr. Carcamo     We were asked to see the patient in consultation by Dr. Carcamo for evaluation of pancreatitis.        HPI:  Josselyn Brewster is a 42 year old male with history of hypertension, spinal stenosis, polysubstance abuse, and alcohol use who presented to the emergency room with 2 to 3 days of epigastric pain.    Patient reports that he does drink alcohol fairly regularly.  His last drink was 4 days ago.  He developed epigastric pain shortly after his last drink.  At first he thought it was heartburn.  But then he became nauseated and was throwing out.  No hematemesis.  No fever no chills.  He denies any black or bloody stool.  In the emergency room a CT scan of the abdomen and pelvis revealing acute pancreatitis with an elevated lipase.  Patient had a blood sugar at 308.  There is reports of an A1c of 13.8.  The patient rarely goes to the doctor.    During the course of his evaluation he was found to have significantly elevated triglycerides at 3584.  In addition to his hyperglycemia and ketones he was started on insulin drip and is in the ICU.  Patient denies any history of pancreatitis.  He notes a strong family history of diabetes.  He is unaware of any history of liver disease.  He does have evidence of hepatic steatosis on ultrasound.       PAST MEDICAL HISTORY:  Patient Active Problem List    Diagnosis Date Noted     Acute pancreatitis without infection or necrosis, unspecified pancreatitis type 06/28/2023     Priority: Medium     Chest pain 01/27/2014     Priority: Medium     Swollen feet 01/27/2014     Priority: Medium     Perspiration excessive 01/27/2014     Priority: Medium     Dizziness and giddiness 01/27/2014     Priority: Medium     Generalized  "headaches 01/27/2014     Priority: Medium     Weakness of left leg 01/27/2014     Priority: Medium     Arthritis 01/27/2014     Priority: Medium     spine       Limitation of joint movement 01/27/2014     Priority: Medium     Mechanical and motor problems with neck and trunk 01/27/2014     Priority: Medium     Lumbago 12/14/2007     Priority: Medium     Thoracic or lumbosacral neuritis or radiculitis, unspecified 04/30/2007     Priority: Medium          ROS: A comprehensive ten point review of systems was negative aside from those in mentioned in the HPI.       MEDICATIONS:   Prior to Admission medications    Medication Sig Start Date End Date Taking? Authorizing Provider   acetaminophen (TYLENOL) 500 MG tablet Take 500-1,000 mg by mouth every 6 hours as needed for mild pain   Yes Unknown, Entered By History   citalopram (CELEXA) 40 MG tablet Take 40 mg by mouth daily   Yes Unknown, Entered By History   hydrochlorothiazide (HYDRODIURIL) 25 MG tablet Take 25 mg by mouth daily   Yes Unknown, Entered By History   ibuprofen (ADVIL/MOTRIN) 200 MG tablet Take 200-600 mg by mouth every 4 hours as needed for pain   Yes Unknown, Entered By History   labetalol (NORMODYNE) 100 MG tablet Take 1 tablet (100 mg) by mouth 2 times daily 6/29/21  Yes Will Leahy MD        ALLERGIES:   Allergies   Allergen Reactions     Levofloxacin Hives     Morphine Other (See Comments)     Other reaction(s): Urinary Retention  Other reaction(s): Urinary Retention        SOCIAL HISTORY:  Social History     Tobacco Use     Smoking status: Never   Substance Use Topics     Alcohol use: Yes     Alcohol/week: 0.8 - 1.7 standard drinks of alcohol     Types: 1 - 2 drink(s) per week        FAMILY HISTORY: Family history of diabetes mellitus.       PHYSICAL EXAM:     BP (!) 134/93   Pulse 84   Temp 98.3  F (36.8  C) (Oral)   Resp (!) 7   Ht 1.803 m (5' 11\")   Wt 91 kg (200 lb 9.9 oz)   SpO2 98%   BMI 27.98 kg/m       PHYSICAL " EXAM:  GENERAL:  NAD  SKIN: no suspicious lesions, rashes, jaundice  HEAD: Normocephalic. Atraumatic.  NECK: Neck supple. No adenopathy.   EYES: No scleral icterused  GASTROINTESTINAL: soft, epigastric and LUQ tenderness, non distended, no guarding/rebound  JOINT/EXTREMITIES:  no gross deformities noted, normal muscle tone  NEURO: CN 2-12 grossly intact, no focal deficits  PSYCH: Normal affect        ADDITIONAL COMMENTS:   I reviewed the patient's new clinical lab test results.     Recent Labs   Lab 06/28/23  0757 06/28/23  0017   WBC 7.1 8.3   RBC 4.48 4.89   HGB 12.6* 14.0   HCT 36.5* 38.7*   MCV 82 79   MCH 28.1 28.6   MCHC 34.5 36.2   RDW 14.5 14.0    249     Recent Labs   Lab Test 06/29/23  0538 06/29/23  0220 06/28/23  2158   POTASSIUM 3.4 3.5 3.4   CHLORIDE 98 97* 97*   CO2 17* 22 18*   BUN 3.8* 3.6* 3.6*   ANIONGAP 15 11 14     Recent Labs   Lab Test 06/28/23  0757 06/28/23  0234 06/28/23  0017   ALBUMIN  --  3.6  --    BILITOTAL  --  0.5  --    ALT  --  21  --    LIPASE 176*  --  140*       Recent Labs   Lab 06/28/23  0757 06/28/23  0017   LIPASE 176* 140*        IMAGING / ENDOSCOPY    EXAM: CT ABDOMEN PELVIS W CONTRAST  LOCATION: [facility]  DATE: 6/28/2023     FINDINGS:   LOWER CHEST: Normal.  HEPATOBILIARY: Steatosis.  PANCREAS: Edematous changes pancreatic head/uncinate process with adjacent stranding edema.  SPLEEN: Normal.  ADRENAL GLANDS: Normal.  KIDNEYS/BLADDER: Normal.  BOWEL: Appendix normal.  LYMPH NODES: Normal.  VASCULATURE: Atherosclerosis.  PELVIC ORGANS: Normal.  MUSCULOSKELETAL: Normal.                                                                   IMPRESSION:   1.  Acute pancreatitis head/uncinate process.  2.  Hepatic steatosis.    EXAM: US ABDOMEN LIMITED  LOCATION: [facility]  DATE: 6/28/2023     INDICATION: pancreatitis, rule out obstruction  COMPARISON: CT abdomen and pelvis 06/28/2023.  TECHNIQUE: Limited abdominal  ultrasound.     FINDINGS:     GALLBLADDER: Normal. No gallstones, wall thickening, or pericholecystic fluid. Negative sonographic Chang's sign.     BILE DUCTS: No biliary dilatation. The common duct measures 4 mm.     LIVER: Echogenic parenchyma. No focal mass.     RIGHT KIDNEY: No hydronephrosis.     PANCREAS: The visualized portions are normal.     No ascites.     IMPRESSION:  1.  Hepatic steatosis.  2.  Normal gallbladder.      CONSULTATION ASSESSMENT AND PLAN:    Josselyn Brewster is a 42 year old  with history of hypertension, spinal stenosis, polysubstance abuse, and alcohol use who presented to the emergency room with 2 to 3 days of epigastric pain found to have acute pancreatitis secondary to hypertriglyceridemia and alcohol use.    1.  Acute pancreatitis: Patient does have a history of regular alcohol use.  He was found to have significantly elevated triglycerides at 3500.  He is also newly a diabetic with hyperglycemia and ketones.  He was started on an insulin drip to also be beneficial for his elevated triglycerides.  Patient has hepatic steatosis on ultrasound.  No evidence of  choledocholithiasis.  No fever no chills.    -- CT without any evidence of pseudocyst or necrosis.  -- We will continue insulin drip with goal of TGs < 500. Monitor triglycerides every 12 hrs.  -- Once TGs < 1000 can start long termoral agent (gemfibrozil 600 mg BID).  -- Continue management with IV fluids, pain control, antiemetics.  -- Encouraged abstinence from alcohol.  -- Recheck LFTs tomorrow.    I discussed the patient plan with Dr. Blank, GI staff physician. Thank you for asking us to participate in the care of this patient.     70 min of total time was spent providing patient care, including patient evaluation, reviewing documentation/ test results, and .     Chandrika Mejia PA-C  [facility] ( [facility])     ------------------  I agree with the assessment and plan of Chandrika Mejia PA-C.  The  patient reports his abd pain is improving, he is tolerating clear liquid diet.  On exam he has tenderness of epigastric area with palpation.  Labs are notable for normal WCC, normal Creatinine, and low hematocrit.  A/P  Acute pancreatitis secondary to alcohol and hypertriglyceridemia.  Plan as delineated above from Anne Blank MD    This was a shared visit and I spent about 15 minutes in the care of this patient.

## 2023-06-29 NOTE — PLAN OF CARE
ICU End of Shift Summary.  For vital signs and complete assessments, please see documentation flowsheets.      Pertinent assessments: AOx4. Reports abd pain 7/10 improved with PRNs. Tele SR. BP elevated, -150s. HR 70-90s. LS clear, sats high 90s on room air. Urinates without difficulty. Tolerating jello, apple juice and water. Nausea following broth, improved with Zofran.   Major Shift Events: Mg, K replaced.   Plan (Upcoming Events): Remain on insulin gtt, hourly BG checks. Ketones q 8h, Triglycerides q 12 hrs. BMP @ 2200.  Replace electrolytes per protocol.  Discharge/Transfer Needs: Diabetic education, pt inquiring if he will need to establish care with an endocrinologist     Bedside Shift Report Completed : Y  Bedside Safety Check Completed: Y    Goal Outcome Evaluation: Progressing

## 2023-06-29 NOTE — PROGRESS NOTES
Addendum:    @ 1517  K was redrawn and found to be 3.8.   DATE/TIME OF CALL RECEIVED FROM LAB:  06/29/23 at 3:09 PM   LAB TEST: K+  LAB VALUE:  >10  PROVIDER NOTIFIED?: Yes  PROVIDER NAME: Dr. Lobo. Bedside RN notified  DATE/TIME LAB VALUE REPORTED TO PROVIDER: 9312  MECHANISM OF PROVIDER NOTIFICATION: Face-To-Face  PROVIDER RESPONSE: Recheck STAT.

## 2023-06-30 ENCOUNTER — APPOINTMENT (OUTPATIENT)
Dept: GENERAL RADIOLOGY | Facility: CLINIC | Age: 43
End: 2023-06-30
Attending: INTERNAL MEDICINE
Payer: COMMERCIAL

## 2023-06-30 ENCOUNTER — APPOINTMENT (OUTPATIENT)
Dept: ULTRASOUND IMAGING | Facility: CLINIC | Age: 43
End: 2023-06-30
Attending: INTERNAL MEDICINE
Payer: COMMERCIAL

## 2023-06-30 LAB
ALBUMIN SERPL BCG-MCNC: 3.7 G/DL (ref 3.5–5.2)
ALP SERPL-CCNC: 77 U/L (ref 40–129)
ALT SERPL W P-5'-P-CCNC: 32 U/L (ref 0–70)
ANION GAP SERPL CALCULATED.3IONS-SCNC: 8 MMOL/L (ref 7–15)
AST SERPL W P-5'-P-CCNC: 38 U/L (ref 0–45)
B-OH-BUTYR SERPL-SCNC: 0.4 MMOL/L
B-OH-BUTYR SERPL-SCNC: 0.5 MMOL/L
B-OH-BUTYR SERPL-SCNC: 0.7 MMOL/L
BILIRUB SERPL-MCNC: 0.6 MG/DL
BUN SERPL-MCNC: 2 MG/DL (ref 6–20)
CALCIUM SERPL-MCNC: 9.1 MG/DL (ref 8.6–10)
CHLORIDE SERPL-SCNC: 99 MMOL/L (ref 98–107)
CREAT SERPL-MCNC: 0.85 MG/DL (ref 0.67–1.17)
DEPRECATED HCO3 PLAS-SCNC: 24 MMOL/L (ref 22–29)
ERYTHROCYTE [DISTWIDTH] IN BLOOD BY AUTOMATED COUNT: 14.1 % (ref 10–15)
GFR SERPL CREATININE-BSD FRML MDRD: >90 ML/MIN/1.73M2
GLUCOSE BLDC GLUCOMTR-MCNC: 121 MG/DL (ref 70–99)
GLUCOSE BLDC GLUCOMTR-MCNC: 124 MG/DL (ref 70–99)
GLUCOSE BLDC GLUCOMTR-MCNC: 124 MG/DL (ref 70–99)
GLUCOSE BLDC GLUCOMTR-MCNC: 133 MG/DL (ref 70–99)
GLUCOSE BLDC GLUCOMTR-MCNC: 136 MG/DL (ref 70–99)
GLUCOSE BLDC GLUCOMTR-MCNC: 137 MG/DL (ref 70–99)
GLUCOSE BLDC GLUCOMTR-MCNC: 138 MG/DL (ref 70–99)
GLUCOSE BLDC GLUCOMTR-MCNC: 141 MG/DL (ref 70–99)
GLUCOSE BLDC GLUCOMTR-MCNC: 143 MG/DL (ref 70–99)
GLUCOSE BLDC GLUCOMTR-MCNC: 146 MG/DL (ref 70–99)
GLUCOSE BLDC GLUCOMTR-MCNC: 147 MG/DL (ref 70–99)
GLUCOSE BLDC GLUCOMTR-MCNC: 149 MG/DL (ref 70–99)
GLUCOSE BLDC GLUCOMTR-MCNC: 152 MG/DL (ref 70–99)
GLUCOSE BLDC GLUCOMTR-MCNC: 157 MG/DL (ref 70–99)
GLUCOSE BLDC GLUCOMTR-MCNC: 159 MG/DL (ref 70–99)
GLUCOSE BLDC GLUCOMTR-MCNC: 159 MG/DL (ref 70–99)
GLUCOSE BLDC GLUCOMTR-MCNC: 163 MG/DL (ref 70–99)
GLUCOSE BLDC GLUCOMTR-MCNC: 171 MG/DL (ref 70–99)
GLUCOSE BLDC GLUCOMTR-MCNC: 176 MG/DL (ref 70–99)
GLUCOSE BLDC GLUCOMTR-MCNC: 178 MG/DL (ref 70–99)
GLUCOSE BLDC GLUCOMTR-MCNC: 181 MG/DL (ref 70–99)
GLUCOSE BLDC GLUCOMTR-MCNC: 203 MG/DL (ref 70–99)
GLUCOSE BLDC GLUCOMTR-MCNC: 252 MG/DL (ref 70–99)
GLUCOSE BLDC GLUCOMTR-MCNC: 270 MG/DL (ref 70–99)
GLUCOSE SERPL-MCNC: 124 MG/DL (ref 70–99)
HCT VFR BLD AUTO: 34.9 % (ref 40–53)
HGB BLD-MCNC: 11.5 G/DL (ref 13.3–17.7)
HIV 1+2 AB+HIV1P24 AG SERPLBLD IA.RAPID: NON REACTIVE
HIV 1+2 AB+HIV1P24 AG SERPLBLD IA.RAPID: NON REACTIVE
HIV INTERPRETATION: NORMAL
LIPASE SERPL-CCNC: 56 U/L (ref 13–60)
MAGNESIUM SERPL-MCNC: 1.9 MG/DL (ref 1.7–2.3)
MCH RBC QN AUTO: 26.4 PG (ref 26.5–33)
MCHC RBC AUTO-ENTMCNC: 33 G/DL (ref 31.5–36.5)
MCV RBC AUTO: 80 FL (ref 78–100)
PHOSPHATE SERPL-MCNC: 1.8 MG/DL (ref 2.5–4.5)
PLATELET # BLD AUTO: 207 10E3/UL (ref 150–450)
POTASSIUM SERPL-SCNC: 3.6 MMOL/L (ref 3.4–5.3)
POTASSIUM SERPL-SCNC: 3.6 MMOL/L (ref 3.4–5.3)
PROT SERPL-MCNC: 6.6 G/DL (ref 6.4–8.3)
RBC # BLD AUTO: 4.35 10E6/UL (ref 4.4–5.9)
SODIUM SERPL-SCNC: 131 MMOL/L (ref 136–145)
TRIGL SERPL-MCNC: 1080 MG/DL
TRIGL SERPL-MCNC: 1663 MG/DL
TRIGL SERPL-MCNC: 971 MG/DL
WBC # BLD AUTO: 5.4 10E3/UL (ref 4–11)

## 2023-06-30 PROCEDURE — 85027 COMPLETE CBC AUTOMATED: CPT | Performed by: INTERNAL MEDICINE

## 2023-06-30 PROCEDURE — 120N000001 HC R&B MED SURG/OB

## 2023-06-30 PROCEDURE — 84100 ASSAY OF PHOSPHORUS: CPT | Performed by: INTERNAL MEDICINE

## 2023-06-30 PROCEDURE — 82010 KETONE BODYS QUAN: CPT | Performed by: INTERNAL MEDICINE

## 2023-06-30 PROCEDURE — 83690 ASSAY OF LIPASE: CPT | Performed by: INTERNAL MEDICINE

## 2023-06-30 PROCEDURE — 250N000009 HC RX 250: Performed by: INTERNAL MEDICINE

## 2023-06-30 PROCEDURE — 93971 EXTREMITY STUDY: CPT | Mod: LT

## 2023-06-30 PROCEDURE — 83735 ASSAY OF MAGNESIUM: CPT | Performed by: INTERNAL MEDICINE

## 2023-06-30 PROCEDURE — 250N000011 HC RX IP 250 OP 636: Performed by: INTERNAL MEDICINE

## 2023-06-30 PROCEDURE — 80053 COMPREHEN METABOLIC PANEL: CPT | Performed by: INTERNAL MEDICINE

## 2023-06-30 PROCEDURE — 84132 ASSAY OF SERUM POTASSIUM: CPT | Performed by: INTERNAL MEDICINE

## 2023-06-30 PROCEDURE — 250N000011 HC RX IP 250 OP 636: Mod: JZ | Performed by: INTERNAL MEDICINE

## 2023-06-30 PROCEDURE — 250N000013 HC RX MED GY IP 250 OP 250 PS 637: Performed by: INTERNAL MEDICINE

## 2023-06-30 PROCEDURE — 73610 X-RAY EXAM OF ANKLE: CPT | Mod: LT

## 2023-06-30 PROCEDURE — 99233 SBSQ HOSP IP/OBS HIGH 50: CPT | Performed by: INTERNAL MEDICINE

## 2023-06-30 PROCEDURE — 36415 COLL VENOUS BLD VENIPUNCTURE: CPT | Performed by: INTERNAL MEDICINE

## 2023-06-30 PROCEDURE — 120N000013 HC R&B IMCU

## 2023-06-30 PROCEDURE — 84478 ASSAY OF TRIGLYCERIDES: CPT | Performed by: INTERNAL MEDICINE

## 2023-06-30 PROCEDURE — 84478 ASSAY OF TRIGLYCERIDES: CPT | Performed by: PHYSICIAN ASSISTANT

## 2023-06-30 PROCEDURE — 999N000104 HEPATITIS C RNA, QUANTITATIVE BY PCR: Performed by: INTERNAL MEDICINE

## 2023-06-30 RX ORDER — NITROGLYCERIN 0.4 MG/1
0.4 TABLET SUBLINGUAL EVERY 5 MIN PRN
Status: DISCONTINUED | OUTPATIENT
Start: 2023-06-30 | End: 2023-07-03 | Stop reason: HOSPADM

## 2023-06-30 RX ORDER — LIDOCAINE 40 MG/G
CREAM TOPICAL
Status: DISCONTINUED | OUTPATIENT
Start: 2023-06-30 | End: 2023-07-03 | Stop reason: HOSPADM

## 2023-06-30 RX ADMIN — FOLIC ACID 1 MG: 1 TABLET ORAL at 09:06

## 2023-06-30 RX ADMIN — HYDROMORPHONE HYDROCHLORIDE 1 MG: 1 INJECTION, SOLUTION INTRAMUSCULAR; INTRAVENOUS; SUBCUTANEOUS at 20:19

## 2023-06-30 RX ADMIN — HYDROMORPHONE HYDROCHLORIDE 4 MG: 2 TABLET ORAL at 05:46

## 2023-06-30 RX ADMIN — HYDROMORPHONE HYDROCHLORIDE 4 MG: 2 TABLET ORAL at 17:59

## 2023-06-30 RX ADMIN — POTASSIUM CHLORIDE: 2 INJECTION, SOLUTION, CONCENTRATE INTRAVENOUS at 12:45

## 2023-06-30 RX ADMIN — ACETAMINOPHEN 650 MG: 325 TABLET, FILM COATED ORAL at 21:48

## 2023-06-30 RX ADMIN — POTASSIUM CHLORIDE: 2 INJECTION, SOLUTION, CONCENTRATE INTRAVENOUS at 22:58

## 2023-06-30 RX ADMIN — HYDROMORPHONE HYDROCHLORIDE 1 MG: 1 INJECTION, SOLUTION INTRAMUSCULAR; INTRAVENOUS; SUBCUTANEOUS at 13:10

## 2023-06-30 RX ADMIN — ENOXAPARIN SODIUM 40 MG: 40 INJECTION SUBCUTANEOUS at 20:07

## 2023-06-30 RX ADMIN — Medication 10 UNITS/HR: at 20:07

## 2023-06-30 RX ADMIN — HYDROMORPHONE HYDROCHLORIDE 4 MG: 2 TABLET ORAL at 12:06

## 2023-06-30 RX ADMIN — THERA TABS 1 TABLET: TAB at 09:06

## 2023-06-30 RX ADMIN — Medication 4 UNITS/HR: at 10:54

## 2023-06-30 RX ADMIN — HYDROMORPHONE HYDROCHLORIDE 4 MG: 2 TABLET ORAL at 09:06

## 2023-06-30 RX ADMIN — HYDROXYZINE HYDROCHLORIDE 25 MG: 25 TABLET, FILM COATED ORAL at 12:06

## 2023-06-30 RX ADMIN — POTASSIUM CHLORIDE: 2 INJECTION, SOLUTION, CONCENTRATE INTRAVENOUS at 02:25

## 2023-06-30 RX ADMIN — HYDROMORPHONE HYDROCHLORIDE 4 MG: 2 TABLET ORAL at 02:46

## 2023-06-30 RX ADMIN — HYDROXYZINE HYDROCHLORIDE 25 MG: 25 TABLET, FILM COATED ORAL at 22:00

## 2023-06-30 RX ADMIN — THIAMINE HCL TAB 100 MG 100 MG: 100 TAB at 09:06

## 2023-06-30 RX ADMIN — HYDROMORPHONE HYDROCHLORIDE 4 MG: 2 TABLET ORAL at 22:00

## 2023-06-30 ASSESSMENT — ACTIVITIES OF DAILY LIVING (ADL)
ADLS_ACUITY_SCORE: 22
ADLS_ACUITY_SCORE: 20
ADLS_ACUITY_SCORE: 22
ADLS_ACUITY_SCORE: 20
ADLS_ACUITY_SCORE: 22
ADLS_ACUITY_SCORE: 20

## 2023-06-30 NOTE — PLAN OF CARE
Goal Outcome Evaluation:       Arrived on unit at 1535. C status. A&Ox4. SBA. Insulin and IV fluids infusing. Pain 6/10 in left ankle and abdomen. Denies CP or SOB. PRN Dilaudid given for pain. Q2h VS and hourly BG. Clear liquid diet, would like to be advanced to full liquid if possible. Will continue to follow POC.

## 2023-06-30 NOTE — PLAN OF CARE
ICU End of Shift Summary.  For vital signs and complete assessments, please see documentation flowsheets.      Pertinent assessments: A&O. Able to express needs. Abdominal pain 7 to 4/10. PRN po dilaudid. Tele SR. VSS. Afebrile. LS diminished/clear. O2 sats adequate on RA. CL liquid diet. Nausea x's 1, resolved with zofran. +BS. Last BM 6/27 per pt. -117. Insulin gtt infusing, Alg 2. MIVF D5 1/2 NS+30 Meq. 2 Patent PIV. Up with SBA to BR, voiding adequate. Skin intact.   Major Shift Events: potassium and ca+ replaced.   Plan (Upcoming Events): Continue ICU cares.  Discharge/Transfer Needs: TBD     Bedside Shift Report Completed : yes  Bedside Safety Check Completed: yes    Goal Outcome Evaluation:      Plan of Care Reviewed With: patient

## 2023-06-30 NOTE — PROGRESS NOTES
"GASTROENTEROLOGY PROGRESS NOTE        SUBJECTIVE:  Continued abdominal pain, managed well with IV pain medication.  No nausea no vomiting.  No fever and no chills      OBJECTIVE:    BP (!) 144/98 (BP Location: Left arm)   Pulse 85   Temp 99.8  F (37.7  C) (Axillary)   Resp 16   Ht 1.803 m (5' 11\")   Wt 92.9 kg (204 lb 12.9 oz)   SpO2 90%   BMI 28.56 kg/m    Temp (24hrs), Av.7  F (37.1  C), Min:98.2  F (36.8  C), Max:99.8  F (37.7  C)    Patient Vitals for the past 72 hrs:   Weight   23 0546 92.9 kg (204 lb 12.9 oz)   23 1000 91 kg (200 lb 9.9 oz)   23 0010 88.8 kg (195 lb 12.3 oz)       Intake/Output Summary (Last 24 hours) at 2023 1005  Last data filed at 2023 0800  Gross per 24 hour   Intake 3841.16 ml   Output 2525 ml   Net 1316.16 ml        PHYSICAL EXAM     Constitutional: no distress, appearing comfortable  Abdomen: Soft, epigastric and left upper quadrant tenderness, no rebound and no guarding.         Additional Comments:  ROS, FH, SH: See initial GI consult for details.     I have reviewed the patient's new clinical lab results:     Recent Labs   Lab Test 23  0539 23  0757 23  0017   WBC 5.4 7.1 8.3   HGB 11.5* 12.6* 14.0   MCV 80 82 79    197 249     Recent Labs   Lab Test 23  0546 23  0357 23  2208 23  1425 23  1102   POTASSIUM 3.6 3.6 3.3*   < > 3.3*   CHLORIDE 99  --  97*  --  97*   CO2 24  --  23  --  20*   BUN 2.0*  --  2.4*  --  3.7*   ANIONGAP 8  --  10  --  10    < > = values in this interval not displayed.     Recent Labs   Lab Test 23  0546 23  1643 23  0757 23  0234 23  0017   ALBUMIN 3.7  --   --  3.6  --    BILITOTAL 0.6  --   --  0.5  --    ALT 32  --   --  21  --    AST 38  --   --   --   --    PROTEIN  --  Negative  --   --   --    LIPASE 56  --  176*  --  140*       ASSESSMENT/ PLAN    Josselyn Brewster is a 42 year old  with history of hypertension, spinal stenosis, " polysubstance abuse, and alcohol use who presented to the emergency room with 2 to 3 days of epigastric pain found to have acute pancreatitis secondary to hypertriglyceridemia and alcohol use.     1.  Acute pancreatitis: Patient does have a history of regular alcohol use.  He was found to have significantly elevated triglycerides at 3500.  He is also newly a diabetic with hyperglycemia and ketones.  He was started on an insulin drip to also be beneficial for his elevated triglycerides.  Patient has hepatic steatosis on ultrasound.  No evidence of  choledocholithiasis.  No fever no chills.  LFTs are normal.     -- Awaiting triglyceride results  -- CT without any evidence of pseudocyst or necrosis.  -- We will continue insulin drip with goal of TGs < 500. Monitor triglycerides every 12 hrs.  -- Once TGs < 1000 can start long termoral agent (gemfibrozil 600 mg BID).  -- Continue management with IV fluids, pain control, antiemetics.  -- Encouraged abstinence from alcohol.  --Tolerating clear liquids, continue to evaluate nutrition status and potential need for NJ.      Discussed with Dr. Blank.   Chandrika Mejia PA-C  [facility] ( [facility])

## 2023-06-30 NOTE — PROGRESS NOTES
[facility]    Medicine Progress Note - Hospitalist Service    Date of Admission:  6/28/2023    Assessment & Plan     Josselyn Brewster is a 42 year old male patient with past medical history of hypertension, hyperlipidemia, depression, spinal stenosis, history of cocaine dependence and polysubstance abuse, osteoarthritis, came to emergency room with complaint of abdominal pain.  Found to have acute pancreatitis, new diagnosis diabetes mellitus, and severe hypertriglyceridemia.     Acute pancreatitis.  Hypertriglyceridemia  -Possible alcohol induced versus due to severe hypertriglyceridemia.  -Gastroenterology consult appreciated.  -Continue insulin drip.  -Allow clear liquid diet.  -Continuous IV fluids.  -Recheck triglycerides every 12 hours.  -Plan to start gemfibrozil 600 mg twice a day once triglycerides less than 1000.     New onset diabetes mellitus.  Diabetic ketoacidosis.  -Continue insulin drip.  -Decrease frequency of ketones and metabolic panel monitoring.  -Continue IV fluids.     Hypokalemia.  Hypomagnesemia.  Hypocalcemia.  Hyponatremia.  -Continue IV fluids with potassium chloride added.  -Additional potassium replacement protocol as needed.  -Magnesium replacement protocol.  -Dose of calcium gluconate 1 g IV on 6/29.     Alcohol use disorder.  -Not currently in obvious withdrawals.  -At risk for withdrawals.  -Would benefit from alcohol cessation or at least reduction.  -Continue folic acid 1 mg a day, multivitamin, thiamine 100 mg a day.  -CIWA protocol with benzodiazepines as needed.     Previous fevers.  -Currently resolved.  -Possibly due to pancreatitis.  -Blood cultures from 6/29 with no growth to date  -chest x-ray with no acute abnormalities.  -Urinalysis 6/29 unremarkable.  -Monitor for recurrence.     Left ankle pain and swelling.  -LLE Doppler ultrasound negative for DVT.  -Left ankle x-ray negative for acute fractures.  -Pain medications as needed.       Diet: Clear  "Liquid Diet    DVT Prophylaxis: Pneumatic Compression Devices  Franz Catheter: Not present  Lines: None     Cardiac Monitoring: ACTIVE order. Indication: Tachyarrhythmias, acute (48 hours)  Code Status: Full Code      Clinically Significant Risk Factors        # Hypokalemia: Lowest K = 3.3 mmol/L in last 2 days, will replace as needed  # Hyperkalemia: Highest K = 11 mmol/L in last 2 days, will monitor as appropriate  # Hyponatremia: Lowest Na = 127 mmol/L in last 2 days, will monitor as appropriate  # Hypocalcemia: Lowest Ca = 8.2 mg/dL in last 2 days, will monitor and replace as appropriate   # Hypomagnesemia: Lowest Mg = 1.5 mg/dL in last 2 days, will replace as needed             # DMII: A1C = 13.6 % (Ref range: <5.7 %) within past 6 months, PRESENT ON ADMISSION  # Overweight: Estimated body mass index is 28.56 kg/m  as calculated from the following:    Height as of this encounter: 1.803 m (5' 11\").    Weight as of this encounter: 92.9 kg (204 lb 12.9 oz)., PRESENT ON ADMISSION          Disposition Plan     Expected Discharge Date: 07/01/2023      Destination: home            Jb Carcamo DO  Hospitalist Service  [facility]  Securely message with [employer] (more info)  Text page via [employer] Paging/Directory   ______________________________________________________________________    Interval History   Continues to have abdominal pain and nausea.  Improved from previous.  Developed left ankle pain and swelling this morning.  Denies chest pain, shortness of breath, fevers, chills.    Physical Exam   Vital Signs: Temp: 98.4  F (36.9  C) Temp src: Oral BP: (!) 141/90 Pulse: 83   Resp: 18 SpO2: 97 % O2 Device: None (Room air)    Weight: 204 lbs 12.92 oz    Gen:  NAD, A&Ox3.  Eyes:  PERRL, sclera anicteric.  OP:  MMM, no lesions.  Neck:  Supple.  CV:  Regular, no murmurs.  Lung:  CTA b/l, normal effort.  Ab:  +BS, soft.  Skin:  Warm, dry to touch.  No rash.  Ext:  No pitting edema LE " b/l.      Medical Decision Making       45 MINUTES SPENT BY ME on the date of service doing chart review, history, exam, documentation & further activities per the note.      Data     I have personally reviewed the following data over the past 24 hrs:    5.4  \   11.5 (L)   / 207     131 (L) 99 2.0 (L) /  157 (H)   3.6 24 0.85 \       ALT: 32 AST: 38 AP: 77 TBILI: 0.6   ALB: 3.7 TOT PROTEIN: 6.6 LIPASE: 56       Imaging results reviewed over the past 24 hrs:   Recent Results (from the past 24 hour(s))   XR Ankle Port Left G/E 3 Views    Narrative    ANKLE PORTABLE LEFT THREE OR MORE VIEWS June 30, 2023 11:02 AM     HISTORY: Left ankle pain.    COMPARISON: None.      Impression    IMPRESSION: Small ossicles distal to the medial and lateral malleoli  from old trauma. No evidence of acute or subacute fracture. Small to  moderate ankle joint osteophytes. Otherwise negative.    TRUMAN MILES MD         SYSTEM ID:  [id number]   US Lower Extremity Venous Duplex Left    Narrative    VENOUS ULTRASOUND LEFT LEG  6/30/2023 11:56 AM     HISTORY: left leg swelling    COMPARISON: None.    FINDINGS:  Examination of the deep veins with graded compression and  color flow Doppler with spectral wave form analysis was performed.   There is no evidence for DVT in the left lower extremity.      Impression    IMPRESSION: No evidence of deep venous thrombosis.    RANJITH BEAVER MD         SYSTEM ID:  [id number]

## 2023-06-30 NOTE — PLAN OF CARE
ICU SHIFT NOTE     Pertinent assessments:    A/O x4    Pain 7/10 with PRN utilized. Rechecks typically 4/10    Voiding w/o difficulties    Tolerating Clear Liquids    On RA, denies CP or SANTO    Small ingrown hairs on back of head    Major shift events:    Xray and US of L ankle done    Trending Triglycerides, per MD note okay to discharge gtt when <500    No signs of withdrawal, last drink 5 days ago    Bedside handoff: Y- report given to Vanda OSUNA RN  Discharge plan: TBD

## 2023-07-01 LAB
ALBUMIN SERPL BCG-MCNC: 3.9 G/DL (ref 3.5–5.2)
ALP SERPL-CCNC: 75 U/L (ref 40–129)
ALT SERPL W P-5'-P-CCNC: 40 U/L (ref 0–70)
ANION GAP SERPL CALCULATED.3IONS-SCNC: 9 MMOL/L (ref 7–15)
AST SERPL W P-5'-P-CCNC: 46 U/L (ref 0–45)
B-OH-BUTYR SERPL-SCNC: 0.7 MMOL/L
BILIRUB SERPL-MCNC: 0.9 MG/DL
BUN SERPL-MCNC: 1.6 MG/DL (ref 6–20)
CALCIUM SERPL-MCNC: 9 MG/DL (ref 8.6–10)
CHLORIDE SERPL-SCNC: 99 MMOL/L (ref 98–107)
CREAT SERPL-MCNC: 0.81 MG/DL (ref 0.67–1.17)
DEPRECATED HCO3 PLAS-SCNC: 26 MMOL/L (ref 22–29)
ERYTHROCYTE [DISTWIDTH] IN BLOOD BY AUTOMATED COUNT: 14.1 % (ref 10–15)
GFR SERPL CREATININE-BSD FRML MDRD: >90 ML/MIN/1.73M2
GLUCOSE BLDC GLUCOMTR-MCNC: 126 MG/DL (ref 70–99)
GLUCOSE BLDC GLUCOMTR-MCNC: 134 MG/DL (ref 70–99)
GLUCOSE BLDC GLUCOMTR-MCNC: 148 MG/DL (ref 70–99)
GLUCOSE BLDC GLUCOMTR-MCNC: 158 MG/DL (ref 70–99)
GLUCOSE BLDC GLUCOMTR-MCNC: 160 MG/DL (ref 70–99)
GLUCOSE BLDC GLUCOMTR-MCNC: 164 MG/DL (ref 70–99)
GLUCOSE BLDC GLUCOMTR-MCNC: 172 MG/DL (ref 70–99)
GLUCOSE BLDC GLUCOMTR-MCNC: 177 MG/DL (ref 70–99)
GLUCOSE BLDC GLUCOMTR-MCNC: 185 MG/DL (ref 70–99)
GLUCOSE BLDC GLUCOMTR-MCNC: 187 MG/DL (ref 70–99)
GLUCOSE BLDC GLUCOMTR-MCNC: 189 MG/DL (ref 70–99)
GLUCOSE BLDC GLUCOMTR-MCNC: 189 MG/DL (ref 70–99)
GLUCOSE BLDC GLUCOMTR-MCNC: 190 MG/DL (ref 70–99)
GLUCOSE BLDC GLUCOMTR-MCNC: 192 MG/DL (ref 70–99)
GLUCOSE BLDC GLUCOMTR-MCNC: 192 MG/DL (ref 70–99)
GLUCOSE BLDC GLUCOMTR-MCNC: 196 MG/DL (ref 70–99)
GLUCOSE BLDC GLUCOMTR-MCNC: 210 MG/DL (ref 70–99)
GLUCOSE BLDC GLUCOMTR-MCNC: 225 MG/DL (ref 70–99)
GLUCOSE BLDC GLUCOMTR-MCNC: 229 MG/DL (ref 70–99)
GLUCOSE BLDC GLUCOMTR-MCNC: 260 MG/DL (ref 70–99)
GLUCOSE BLDC GLUCOMTR-MCNC: 263 MG/DL (ref 70–99)
GLUCOSE BLDC GLUCOMTR-MCNC: 319 MG/DL (ref 70–99)
GLUCOSE BLDC GLUCOMTR-MCNC: 325 MG/DL (ref 70–99)
GLUCOSE BLDC GLUCOMTR-MCNC: 91 MG/DL (ref 70–99)
GLUCOSE SERPL-MCNC: 205 MG/DL (ref 70–99)
HCT VFR BLD AUTO: 34.8 % (ref 40–53)
HCV RNA SERPL NAA+PROBE-ACNC: NOT DETECTED IU/ML
HGB BLD-MCNC: 11.2 G/DL (ref 13.3–17.7)
MAGNESIUM SERPL-MCNC: 1.8 MG/DL (ref 1.7–2.3)
MCH RBC QN AUTO: 26.3 PG (ref 26.5–33)
MCHC RBC AUTO-ENTMCNC: 32.2 G/DL (ref 31.5–36.5)
MCV RBC AUTO: 82 FL (ref 78–100)
PHOSPHATE SERPL-MCNC: 2.4 MG/DL (ref 2.5–4.5)
PHOSPHATE SERPL-MCNC: 2.6 MG/DL (ref 2.5–4.5)
PLATELET # BLD AUTO: 208 10E3/UL (ref 150–450)
POTASSIUM SERPL-SCNC: 4.3 MMOL/L (ref 3.4–5.3)
PROT SERPL-MCNC: 6.9 G/DL (ref 6.4–8.3)
RBC # BLD AUTO: 4.26 10E6/UL (ref 4.4–5.9)
SODIUM SERPL-SCNC: 134 MMOL/L (ref 136–145)
TRIGL SERPL-MCNC: 596 MG/DL
TRIGL SERPL-MCNC: 721 MG/DL
URATE SERPL-MCNC: 2.3 MG/DL (ref 3.4–7)
WBC # BLD AUTO: 5.7 10E3/UL (ref 4–11)

## 2023-07-01 PROCEDURE — 80053 COMPREHEN METABOLIC PANEL: CPT | Performed by: INTERNAL MEDICINE

## 2023-07-01 PROCEDURE — 250N000009 HC RX 250: Performed by: INTERNAL MEDICINE

## 2023-07-01 PROCEDURE — 120N000013 HC R&B IMCU

## 2023-07-01 PROCEDURE — 85027 COMPLETE CBC AUTOMATED: CPT | Performed by: INTERNAL MEDICINE

## 2023-07-01 PROCEDURE — 36415 COLL VENOUS BLD VENIPUNCTURE: CPT | Performed by: INTERNAL MEDICINE

## 2023-07-01 PROCEDURE — 84100 ASSAY OF PHOSPHORUS: CPT | Performed by: INTERNAL MEDICINE

## 2023-07-01 PROCEDURE — 84478 ASSAY OF TRIGLYCERIDES: CPT | Performed by: INTERNAL MEDICINE

## 2023-07-01 PROCEDURE — 250N000011 HC RX IP 250 OP 636: Performed by: INTERNAL MEDICINE

## 2023-07-01 PROCEDURE — 82010 KETONE BODYS QUAN: CPT | Performed by: INTERNAL MEDICINE

## 2023-07-01 PROCEDURE — 83735 ASSAY OF MAGNESIUM: CPT | Performed by: INTERNAL MEDICINE

## 2023-07-01 PROCEDURE — 99233 SBSQ HOSP IP/OBS HIGH 50: CPT | Performed by: INTERNAL MEDICINE

## 2023-07-01 PROCEDURE — 250N000013 HC RX MED GY IP 250 OP 250 PS 637: Performed by: INTERNAL MEDICINE

## 2023-07-01 PROCEDURE — 84550 ASSAY OF BLOOD/URIC ACID: CPT | Performed by: INTERNAL MEDICINE

## 2023-07-01 RX ORDER — GEMFIBROZIL 600 MG/1
600 TABLET, FILM COATED ORAL
Status: DISCONTINUED | OUTPATIENT
Start: 2023-07-01 | End: 2023-07-03 | Stop reason: HOSPADM

## 2023-07-01 RX ORDER — BLOOD PRESSURE TEST KIT
KIT MISCELLANEOUS
Qty: 100 EACH | Refills: 0 | Status: SHIPPED | OUTPATIENT
Start: 2023-07-01 | End: 2023-12-23

## 2023-07-01 RX ORDER — CALCIUM CARBONATE 500(1250)
500 TABLET ORAL 2 TIMES DAILY WITH MEALS
Status: DISCONTINUED | OUTPATIENT
Start: 2023-07-01 | End: 2023-07-03 | Stop reason: HOSPADM

## 2023-07-01 RX ORDER — CONTAINER,EMPTY
EACH MISCELLANEOUS
Qty: 1 EACH | Refills: 0 | Status: SHIPPED | OUTPATIENT
Start: 2023-07-01

## 2023-07-01 RX ADMIN — Medication 1.5 UNITS/HR: at 07:46

## 2023-07-01 RX ADMIN — ACETAMINOPHEN 650 MG: 325 TABLET, FILM COATED ORAL at 06:52

## 2023-07-01 RX ADMIN — POTASSIUM CHLORIDE: 2 INJECTION, SOLUTION, CONCENTRATE INTRAVENOUS at 19:01

## 2023-07-01 RX ADMIN — HYDROMORPHONE HYDROCHLORIDE 4 MG: 2 TABLET ORAL at 23:25

## 2023-07-01 RX ADMIN — POTASSIUM & SODIUM PHOSPHATES POWDER PACK 280-160-250 MG 1 PACKET: 280-160-250 PACK at 23:26

## 2023-07-01 RX ADMIN — ACETAMINOPHEN 650 MG: 325 TABLET, FILM COATED ORAL at 22:42

## 2023-07-01 RX ADMIN — GEMFIBROZIL 600 MG: 600 TABLET ORAL at 16:59

## 2023-07-01 RX ADMIN — POTASSIUM CHLORIDE: 2 INJECTION, SOLUTION, CONCENTRATE INTRAVENOUS at 08:37

## 2023-07-01 RX ADMIN — HYDROMORPHONE HYDROCHLORIDE 4 MG: 2 TABLET ORAL at 03:37

## 2023-07-01 RX ADMIN — HYDROMORPHONE HYDROCHLORIDE 1 MG: 1 INJECTION, SOLUTION INTRAMUSCULAR; INTRAVENOUS; SUBCUTANEOUS at 00:08

## 2023-07-01 RX ADMIN — THERA TABS 1 TABLET: TAB at 09:09

## 2023-07-01 RX ADMIN — HYDROMORPHONE HYDROCHLORIDE 4 MG: 2 TABLET ORAL at 20:04

## 2023-07-01 RX ADMIN — HYDROMORPHONE HYDROCHLORIDE 4 MG: 2 TABLET ORAL at 16:59

## 2023-07-01 RX ADMIN — HYDROXYZINE HYDROCHLORIDE 25 MG: 25 TABLET, FILM COATED ORAL at 14:16

## 2023-07-01 RX ADMIN — HYDROXYZINE HYDROCHLORIDE 25 MG: 25 TABLET, FILM COATED ORAL at 06:57

## 2023-07-01 RX ADMIN — POTASSIUM & SODIUM PHOSPHATES POWDER PACK 280-160-250 MG 1 PACKET: 280-160-250 PACK at 20:02

## 2023-07-01 RX ADMIN — Medication 7 UNITS/HR: at 20:03

## 2023-07-01 RX ADMIN — HYDROMORPHONE HYDROCHLORIDE 4 MG: 2 TABLET ORAL at 09:09

## 2023-07-01 RX ADMIN — CALCIUM 500 MG: 500 TABLET ORAL at 16:59

## 2023-07-01 RX ADMIN — HYDROMORPHONE HYDROCHLORIDE 4 MG: 2 TABLET ORAL at 14:15

## 2023-07-01 RX ADMIN — ACETAMINOPHEN 650 MG: 325 TABLET, FILM COATED ORAL at 02:10

## 2023-07-01 RX ADMIN — GEMFIBROZIL 600 MG: 600 TABLET ORAL at 09:09

## 2023-07-01 RX ADMIN — ENOXAPARIN SODIUM 40 MG: 40 INJECTION SUBCUTANEOUS at 20:47

## 2023-07-01 RX ADMIN — THIAMINE HCL TAB 100 MG 100 MG: 100 TAB at 09:09

## 2023-07-01 RX ADMIN — HYDROMORPHONE HYDROCHLORIDE 1 MG: 1 INJECTION, SOLUTION INTRAMUSCULAR; INTRAVENOUS; SUBCUTANEOUS at 05:27

## 2023-07-01 RX ADMIN — FOLIC ACID 1 MG: 1 TABLET ORAL at 09:09

## 2023-07-01 ASSESSMENT — ACTIVITIES OF DAILY LIVING (ADL)
ADLS_ACUITY_SCORE: 21
ADLS_ACUITY_SCORE: 20

## 2023-07-01 NOTE — PROGRESS NOTES
[facility]    Medicine Progress Note - Hospitalist Service    Date of Admission:  6/28/2023    Assessment & Plan     Josselyn Brewster is a 42 year old male patient with past medical history of hypertension, hyperlipidemia, depression, spinal stenosis, history of cocaine dependence and polysubstance abuse, osteoarthritis, came to emergency room with complaint of abdominal pain.  Found to have acute pancreatitis, new diagnosis diabetes mellitus, and severe hypertriglyceridemia.     Acute pancreatitis.  Hypertriglyceridemia  -Possible alcohol induced versus due to severe hypertriglyceridemia.  -Gastroenterology consult appreciated.  -Continue insulin drip.  -Advance to full liquid diet.  -Continuous IV fluids.  -Recheck triglycerides every 12 hours.  -Start gemfibrozil 600 mg twice a day.     New onset diabetes mellitus.  Diabetic ketoacidosis.  -Continue insulin drip.  -Decrease frequency of ketones and metabolic panel monitoring.  -Continue IV fluids.     Hypokalemia.  Hypomagnesemia.  Hypocalcemia.  Hyponatremia.  -Continue IV fluids.  -Additional potassium replacement protocol as needed.  -Magnesium replacement protocol.  -Dose of calcium gluconate 1 g IV on 6/29.  -Start scheduled calcium carbonate 500 mg twice a day.     Alcohol use disorder.  -Not currently in obvious withdrawals.  -At risk for withdrawals.  -Would benefit from alcohol cessation or at least reduction.  -Continue folic acid 1 mg a day, multivitamin, thiamine 100 mg a day.  -CIWA protocol with benzodiazepines as needed.     Previous fevers.  -Currently resolved.  -Possibly due to pancreatitis.  -Blood cultures from 6/29 with no growth to date  -chest x-ray with no acute abnormalities.  -Urinalysis 6/29 unremarkable.  -Monitor for recurrence.     Left ankle pain and swelling.  -LLE Doppler ultrasound negative for DVT.  -Left ankle x-ray negative for acute fractures.  -Pain medications as needed.  -Check uric acid.       Diet:  "Full Liquid Diet    DVT Prophylaxis: Enoxaparin (Lovenox) SQ  Franz Catheter: Not present  Lines: None     Cardiac Monitoring: ACTIVE order. Indication: Chest pain/ ACS rule out (24 hours)  Code Status: Full Code      Clinically Significant Risk Factors        # Hypokalemia: Lowest K = 3.3 mmol/L in last 2 days, will replace as needed  # Hyperkalemia: Highest K = 11 mmol/L in last 2 days, will monitor as appropriate   # Hypocalcemia: Lowest Ca = 8.4 mg/dL in last 2 days, will monitor and replace as appropriate               # DMII: A1C = 13.6 % (Ref range: <5.7 %) within past 6 months, PRESENT ON ADMISSION  # Overweight: Estimated body mass index is 28.56 kg/m  as calculated from the following:    Height as of this encounter: 1.803 m (5' 11\").    Weight as of this encounter: 92.9 kg (204 lb 12.9 oz)., PRESENT ON ADMISSION          Disposition Plan      Expected Discharge Date: 07/03/2023      Destination: home            Jb Carcamo DO  Hospitalist Service  [facility]  Securely message with [employer] (more info)  Text page via [employer] Paging/Directory   ______________________________________________________________________    Interval History   Abdominal pain improved.  Still with some nausea.  Noticed left ankle pain overnight.  Ankle pain better this morning.  Denies chest pain, shortness of breath, fevers, chills.    Physical Exam   Vital Signs: Temp: 98.9  F (37.2  C) Temp src: Oral BP: 138/82 Pulse: 86   Resp: 18 SpO2: 98 % O2 Device: None (Room air)    Weight: 204 lbs 12.92 oz    Gen:  NAD, A&Ox3.  Eyes:  PERRL, sclera anicteric.  OP:  MMM, no lesions.  Neck:  Supple.  CV:  Regular, no murmurs.  Lung:  CTA b/l, normal effort.  Ab:  +BS, soft.  Skin:  Warm, dry to touch.  No rash.  Ext:  No pitting edema LE b/l.      Medical Decision Making       39 MINUTES SPENT BY ME on the date of service doing chart review, history, exam, documentation & further activities per the note.      Data "     I have personally reviewed the following data over the past 24 hrs:    5.7  \   11.2 (L)   / 208     134 (L) 99 1.6 (L) /  185 (H)   4.3 26 0.81 \       ALT: 40 AST: 46 (H) AP: 75 TBILI: 0.9   ALB: 3.9 TOT PROTEIN: 6.9 LIPASE: N/A       Imaging results reviewed over the past 24 hrs:   No results found for this or any previous visit (from the past 24 hour(s)).

## 2023-07-01 NOTE — PROGRESS NOTES
GASTROENTEROLOGY PROGRESS NOTE      ASSESSMENT   42-year-old male with history of hypertension, spinal stenosis, polysubstance abuse, alcohol abuse who presented with 2 to 3 days of epigastric pain and found to have pancreatitis likely secondary to alcohol and hypertriglyceridemia.  Regarding the patient's pancreatitis he has been slowly improving.  Triglycerides have dropped pain has decreased and has tolerated a clear liquid diet.  The scan did not show any problems need for interventions.      PLAN  -Agree with advancing to full liquid diet  -Continue IV fluids  -We will continue to follow triglycerides every 12 hours as of gemfibrozil started      Julio Terrell MD  Gastroenterology  [facility]  -----------------------------------------------------------------  S  Still with abdominal pain but improving, less nausea      O  VS: reviewed  Gen: alert oriented x3  CV: RRR no murmmurs  Lungs: cta bilat  Abd: soft nontender, nondistended, +bs, no peritoneal signs  Extem: no edema  Neuro: grossly intact      LABS AND IMAGES  Reviewed and compared

## 2023-07-01 NOTE — PROGRESS NOTES
"Vitals:BP (!) 133/95 (BP Location: Left arm)   Pulse 103   Temp 98.4  F (36.9  C) (Oral)   Resp 20   Ht 1.803 m (5' 11\")   Wt 92.9 kg (204 lb 12.9 oz)   SpO2 100%   BMI 28.56 kg/m      Neuro: A&Ox 4  Resp: Clear, RA  Cardiac: Tele SR/ST 90-110s  GI: Abdominal tenderness, denies nausea  : WDL  Skin: L ankle swelling, extremity elevated and supported with ace bandage, ice pack applied intermittently.   M/S: L  Lines/ Drains: PIV R arm SL, PIV R wrist infusing D5W 0.45NS KCl 100/hr and Insulin gtt  Activity: SBA, walker. Limping due to ankle pain  Nutrition: Clear Liquid  Pain: 7/10-10/10, ankle and abdominal pain somewhat relieved by medication down to 3/10- 4/10 intermittently. Gave dilaudid, tylenol, and atarax PRN overnight.   Labs: Protocol Potassium, Magnesium, Phosphorus. No replacements, AM lab draw. Triglycerides decreasing, 971. Ketone: 0.50    Expected Discharge: TBD  "

## 2023-07-02 LAB
ALBUMIN SERPL BCG-MCNC: 3.5 G/DL (ref 3.5–5.2)
ALP SERPL-CCNC: 80 U/L (ref 40–129)
ALT SERPL W P-5'-P-CCNC: 52 U/L (ref 0–70)
ANION GAP SERPL CALCULATED.3IONS-SCNC: 11 MMOL/L (ref 7–15)
AST SERPL W P-5'-P-CCNC: 52 U/L (ref 0–45)
B-OH-BUTYR SERPL-SCNC: 0.9 MMOL/L
BILIRUB SERPL-MCNC: 0.7 MG/DL
BUN SERPL-MCNC: 1.6 MG/DL (ref 6–20)
CALCIUM SERPL-MCNC: 9.1 MG/DL (ref 8.6–10)
CHLORIDE SERPL-SCNC: 98 MMOL/L (ref 98–107)
CREAT SERPL-MCNC: 0.88 MG/DL (ref 0.67–1.17)
DEPRECATED HCO3 PLAS-SCNC: 24 MMOL/L (ref 22–29)
GFR SERPL CREATININE-BSD FRML MDRD: >90 ML/MIN/1.73M2
GLUCOSE BLDC GLUCOMTR-MCNC: 112 MG/DL (ref 70–99)
GLUCOSE BLDC GLUCOMTR-MCNC: 123 MG/DL (ref 70–99)
GLUCOSE BLDC GLUCOMTR-MCNC: 144 MG/DL (ref 70–99)
GLUCOSE BLDC GLUCOMTR-MCNC: 159 MG/DL (ref 70–99)
GLUCOSE BLDC GLUCOMTR-MCNC: 165 MG/DL (ref 70–99)
GLUCOSE BLDC GLUCOMTR-MCNC: 166 MG/DL (ref 70–99)
GLUCOSE BLDC GLUCOMTR-MCNC: 167 MG/DL (ref 70–99)
GLUCOSE BLDC GLUCOMTR-MCNC: 182 MG/DL (ref 70–99)
GLUCOSE BLDC GLUCOMTR-MCNC: 189 MG/DL (ref 70–99)
GLUCOSE BLDC GLUCOMTR-MCNC: 192 MG/DL (ref 70–99)
GLUCOSE BLDC GLUCOMTR-MCNC: 228 MG/DL (ref 70–99)
GLUCOSE BLDC GLUCOMTR-MCNC: 236 MG/DL (ref 70–99)
GLUCOSE BLDC GLUCOMTR-MCNC: 242 MG/DL (ref 70–99)
GLUCOSE BLDC GLUCOMTR-MCNC: 263 MG/DL (ref 70–99)
GLUCOSE BLDC GLUCOMTR-MCNC: 331 MG/DL (ref 70–99)
GLUCOSE SERPL-MCNC: 225 MG/DL (ref 70–99)
HBV SURFACE AG SERPL QL IA: NONREACTIVE
HIV 1+2 AB+HIV1 P24 AG SERPL QL IA: NONREACTIVE
HOLD SPECIMEN: NORMAL
MAGNESIUM SERPL-MCNC: 1.6 MG/DL (ref 1.7–2.3)
PHOSPHATE SERPL-MCNC: 3 MG/DL (ref 2.5–4.5)
POTASSIUM SERPL-SCNC: 4.5 MMOL/L (ref 3.4–5.3)
PROT SERPL-MCNC: 6.5 G/DL (ref 6.4–8.3)
SODIUM SERPL-SCNC: 133 MMOL/L (ref 136–145)
TRIGL SERPL-MCNC: 490 MG/DL

## 2023-07-02 PROCEDURE — 83735 ASSAY OF MAGNESIUM: CPT | Performed by: INTERNAL MEDICINE

## 2023-07-02 PROCEDURE — 84478 ASSAY OF TRIGLYCERIDES: CPT | Performed by: INTERNAL MEDICINE

## 2023-07-02 PROCEDURE — 82010 KETONE BODYS QUAN: CPT | Performed by: INTERNAL MEDICINE

## 2023-07-02 PROCEDURE — 84100 ASSAY OF PHOSPHORUS: CPT | Performed by: INTERNAL MEDICINE

## 2023-07-02 PROCEDURE — 250N000011 HC RX IP 250 OP 636: Mod: JZ | Performed by: INTERNAL MEDICINE

## 2023-07-02 PROCEDURE — 99232 SBSQ HOSP IP/OBS MODERATE 35: CPT | Performed by: INTERNAL MEDICINE

## 2023-07-02 PROCEDURE — 250N000012 HC RX MED GY IP 250 OP 636 PS 637: Performed by: INTERNAL MEDICINE

## 2023-07-02 PROCEDURE — 250N000013 HC RX MED GY IP 250 OP 250 PS 637: Performed by: INTERNAL MEDICINE

## 2023-07-02 PROCEDURE — 36415 COLL VENOUS BLD VENIPUNCTURE: CPT | Performed by: INTERNAL MEDICINE

## 2023-07-02 PROCEDURE — 250N000009 HC RX 250: Performed by: INTERNAL MEDICINE

## 2023-07-02 PROCEDURE — 120N000013 HC R&B IMCU

## 2023-07-02 PROCEDURE — 80053 COMPREHEN METABOLIC PANEL: CPT | Performed by: INTERNAL MEDICINE

## 2023-07-02 RX ORDER — NICOTINE POLACRILEX 4 MG
15-30 LOZENGE BUCCAL
Status: DISCONTINUED | OUTPATIENT
Start: 2023-07-02 | End: 2023-07-03 | Stop reason: HOSPADM

## 2023-07-02 RX ORDER — DEXTROSE MONOHYDRATE 25 G/50ML
25-50 INJECTION, SOLUTION INTRAVENOUS
Status: DISCONTINUED | OUTPATIENT
Start: 2023-07-02 | End: 2023-07-03 | Stop reason: HOSPADM

## 2023-07-02 RX ADMIN — HYDROMORPHONE HYDROCHLORIDE 4 MG: 2 TABLET ORAL at 20:54

## 2023-07-02 RX ADMIN — CALCIUM 500 MG: 500 TABLET ORAL at 09:27

## 2023-07-02 RX ADMIN — ACETAMINOPHEN 650 MG: 325 TABLET, FILM COATED ORAL at 23:17

## 2023-07-02 RX ADMIN — CALCIUM 500 MG: 500 TABLET ORAL at 17:12

## 2023-07-02 RX ADMIN — HYDROMORPHONE HYDROCHLORIDE 4 MG: 2 TABLET ORAL at 23:57

## 2023-07-02 RX ADMIN — GEMFIBROZIL 600 MG: 600 TABLET ORAL at 09:27

## 2023-07-02 RX ADMIN — THERA TABS 1 TABLET: TAB at 09:27

## 2023-07-02 RX ADMIN — HYDROMORPHONE HYDROCHLORIDE 4 MG: 2 TABLET ORAL at 13:01

## 2023-07-02 RX ADMIN — Medication 1 UNITS/HR: at 06:16

## 2023-07-02 RX ADMIN — GEMFIBROZIL 600 MG: 600 TABLET ORAL at 17:12

## 2023-07-02 RX ADMIN — ENOXAPARIN SODIUM 40 MG: 40 INJECTION SUBCUTANEOUS at 20:54

## 2023-07-02 RX ADMIN — HYDROMORPHONE HYDROCHLORIDE 4 MG: 2 TABLET ORAL at 09:26

## 2023-07-02 RX ADMIN — HYDROMORPHONE HYDROCHLORIDE 4 MG: 2 TABLET ORAL at 06:30

## 2023-07-02 RX ADMIN — THIAMINE HCL TAB 100 MG 100 MG: 100 TAB at 09:27

## 2023-07-02 RX ADMIN — INSULIN GLARGINE 15 UNITS: 100 INJECTION, SOLUTION SUBCUTANEOUS at 14:37

## 2023-07-02 RX ADMIN — HYDROMORPHONE HYDROCHLORIDE 4 MG: 2 TABLET ORAL at 17:11

## 2023-07-02 RX ADMIN — FOLIC ACID 1 MG: 1 TABLET ORAL at 09:27

## 2023-07-02 ASSESSMENT — ACTIVITIES OF DAILY LIVING (ADL)
ADLS_ACUITY_SCORE: 21
ADLS_ACUITY_SCORE: 19
ADLS_ACUITY_SCORE: 21
ADLS_ACUITY_SCORE: 19
ADLS_ACUITY_SCORE: 20
ADLS_ACUITY_SCORE: 21

## 2023-07-02 NOTE — PROGRESS NOTES
"GASTROENTEROLOGY PROGRESS NOTE      SUBJECTIVE:  Doing well. He ate regular diet and has some mild discomfort, not severe pain. Committed to sobriety     OBJECTIVE:    /69 (BP Location: Right arm)   Pulse 96   Temp 98.2  F (36.8  C) (Oral)   Resp 18   Ht 1.803 m (5' 11\")   Wt 92.9 kg (204 lb 12.9 oz)   SpO2 96%   BMI 28.56 kg/m    Temp (24hrs), Av.7  F (37.1  C), Min:98.2  F (36.8  C), Max:99.7  F (37.6  C)    Patient Vitals for the past 72 hrs:   Weight   23 0546 92.9 kg (204 lb 12.9 oz)       Intake/Output Summary (Last 24 hours) at 2023 1445  Last data filed at 2023 1259  Gross per 24 hour   Intake 1200 ml   Output --   Net 1200 ml        PHYSICAL EXAM     Constitutional: Male patient in bed. No distress  Respiratory: comfortable  Abdomen: nondistended, bowel sounds normal  Neuro: oriented   Skin: normal     Additional Comments:  ROS, FH, SH: See initial GI consult for details.     I have reviewed the patient's new clinical lab results:     Recent Labs   Lab Test 23  0634 23  0539 23  0757   WBC 5.7 5.4 7.1   HGB 11.2* 11.5* 12.6*   MCV 82 80 82    207 197     Recent Labs   Lab Test 23  0700 23  0634 23  0546   POTASSIUM 4.5 4.3 3.6   CHLORIDE 98 99 99   CO2 24 26 24   BUN 1.6* 1.6* 2.0*   ANIONGAP 11 9 8     Recent Labs   Lab Test 23  0700 23  0634 23  0546 23  1643 23  0757 23  0234 23  0017   ALBUMIN 3.5 3.9 3.7  --   --    < >  --    BILITOTAL 0.7 0.9 0.6  --   --    < >  --    ALT 52 40 32  --   --    < >  --    AST 52* 46* 38  --   --   --   --    PROTEIN  --   --   --  Negative  --   --   --    LIPASE  --   --  56  --  176*  --  140*    < > = values in this interval not displayed.       ASSESSMENT/ PLAN  42-year-old male with history of hypertension, spinal stenosis, polysubstance abuse, alcohol abuse who presented  with 2 to 3 days of epigastric pain and found to have pancreatitis likely " secondary to alcohol and hypertriglyceridemia.      Regarding the patient's pancreatitis he has been slowly improving.  Triglycerides have dropped pain has decreased, tolerating diet.   Discussed sobriety as well.     Probably home tomorrow. Will arrange for him to see us in 4-6 weeks.         PLAN   -treat high TG   -sobriety   -small frequent, low fat meals   -follow up with [facility] in 4-6 weeks (Please add this info to discontinue orders)   -probably home tomorrow    I will update Dr. Markham, GI staff  Please call if needed. We will not follow.   Rosalva Scott, DONTRELL  [facility]

## 2023-07-02 NOTE — PLAN OF CARE
Goal Outcome Evaluation:       A&Ox4. VSS. IMC status. SBA. Insulin drip and fluids infusing in Right PIV. Complains of pain in abdomen and left ankle. PO Dilaudid given with relief. CIWA q4h. RA. Denies CP or SOB. Full liquid diet advanced to low fat diet. K, Mag and Phos protocols; rechecks ordered for am.       IMC order, Insulin gtt and fluids discontinued. Triglyceride lab down to 490 today. [facility] rounded and would like to him follow up with them in 4-6 weeks when discharged. Diabetic education given. First dose of Lantus given and explained. Carb counts and Novolog added.

## 2023-07-02 NOTE — PROGRESS NOTES
"Vitals: /82 (BP Location: Left arm)   Pulse 93   Temp 98.7  F (37.1  C) (Oral)   Resp 16   Ht 1.803 m (5' 11\")   Wt 92.9 kg (204 lb 12.9 oz)   SpO2 98%   BMI 28.56 kg/m       Neuro: A&Ox 4  Resp: Clear, RA  Cardiac: Tele SR/ST 90-110s  GI: Abdominal tenderness, denies nausea  : WDL  Skin: L ankle swelling, extremity elevated  M/S: L ankle weakness  Lines/ Drains: PIV R arm SL, PIV R wrist infusing D5W 0.45NS KCl 100/hr and Insulin gtt  Activity: SBA, walker. Limping due to ankle pain  Nutrition: Full Liquid  Pain: 6/10-7/10, ankle and abdominal pain. Gave dilaudid oral x 3 and tylenol x 1 PRN overnight.   Labs: Protocol Potassium, Magnesium, Phosphorus. No replacements, AM lab draw. Triglycerides decreasing, 596.     Expected Discharge: TBD  "

## 2023-07-02 NOTE — PROGRESS NOTES
[facility]    Medicine Progress Note - Hospitalist Service    Date of Admission:  6/28/2023    Assessment & Plan   Josselyn Brewster is a 42 year old male patient with past medical history of hypertension, hyperlipidemia, depression, spinal stenosis, history of cocaine dependence and polysubstance abuse, osteoarthritis, came to emergency room with complaint of abdominal pain.  Found to have acute pancreatitis, new diagnosis diabetes mellitus, and severe hypertriglyceridemia.     Acute pancreatitis.  Hypertriglyceridemia  -Possible alcohol induced versus more likely due to severe hypertriglyceridemia.  -Gastroenterology consult appreciated.  -Triglycerides now less than 500.  -Stop insulin drip.  -Symptoms improved.  -Advance to low-fat diet.  -Stop continuous IV fluids.  -Recheck triglycerides tomorrow.  -Continue gemfibrozil 600 mg twice a day.     New onset diabetes mellitus.  Diabetic ketoacidosis.  -Stop insulin drip.  -Start glargine insulin 15 units daily.  -Start scheduled aspart on a carb counting basis with meals.  -Additional aspart insulin sliding scale as needed.  -Home supplies ordered from pharmacy yesterday.  -Discussed with bedside nurse to start having him do his own glucose monitoring.     Hypokalemia.  Hypomagnesemia.  Hypocalcemia.  Hypophosphatemia.  Hyponatremia.  -Stop IV fluids.  -Potassium replacement protocol.  -Magnesium replacement protocol.  -Phosphorus replacement protocol.  -Dose of calcium gluconate 1 g IV on 6/29.  -Continue scheduled calcium carbonate 500 mg twice a day.     Alcohol use disorder.  -No obvious withdrawals.  -Would benefit from long-term alcohol cessation or at least reduction.  -Continue folic acid 1 mg a day, multivitamin, thiamine 100 mg a day while in hospital.  -Stop CIWA scale.     Previous fevers.  -Currently resolved.  -Possibly due to pancreatitis.  -Blood cultures from 6/29 with no growth to date  -chest x-ray with no acute  "abnormalities.  -Urinalysis 6/29 unremarkable.  -Monitor for recurrence.     Left ankle pain and swelling.  -LLE Doppler ultrasound negative for DVT.  -Left ankle x-ray negative for acute fractures.  -Uric acid not elevated.  -Does not have the appearance of gout.  -Suspect musculoskeletal pain.  -Pain medications as needed.       Diet: Low Fat Diet    DVT Prophylaxis: Pneumatic Compression Devices  Franz Catheter: Not present  Lines: None     Cardiac Monitoring: ACTIVE order. Indication: Tachyarrhythmias, acute (48 hours)  Code Status: Full Code      Clinically Significant Risk Factors            # Hypomagnesemia: Lowest Mg = 1.6 mg/dL in last 2 days, will replace as needed             # DMII: A1C = 13.6 % (Ref range: <5.7 %) within past 6 months   # Overweight: Estimated body mass index is 28.56 kg/m  as calculated from the following:    Height as of this encounter: 1.803 m (5' 11\").    Weight as of this encounter: 92.9 kg (204 lb 12.9 oz).           Disposition Plan     Expected Discharge Date: 07/03/2023      Destination: home            Jb Carcamo DO  Hospitalist Service  [facility]  Securely message with [employer] (more info)  Text page via [facility] Paging/Directory   ______________________________________________________________________    Interval History   Abdominal pain improved.  Nausea improved.  Left ankle pain improved.  Denies chest pain, shortness of breath, fevers, chills.    Physical Exam   Vital Signs: Temp: 98.2  F (36.8  C) Temp src: Oral BP: 124/69 Pulse: 96   Resp: 18 SpO2: 96 % O2 Device: None (Room air)    Weight: 204 lbs 12.92 oz    Gen:  NAD, A&Ox3.  Eyes:  PERRL, sclera anicteric.  OP:  MMM, no lesions.  Neck:  Supple.  CV:  Regular, no murmurs.  Lung:  CTA b/l, normal effort.  Ab:  +BS, soft.  Skin:  Warm, dry to touch.  No rash.  Ext:  No pitting edema LE b/l.      Medical Decision Making       42 MINUTES SPENT BY ME on the date of service doing chart review, " history, exam, documentation & further activities per the note.      Data     I have personally reviewed the following data over the past 24 hrs:    N/A  \   N/A   / N/A     133 (L) 98 1.6 (L) /  144 (H)   4.5 24 0.88 \       ALT: 52 AST: 52 (H) AP: 80 TBILI: 0.7   ALB: 3.5 TOT PROTEIN: 6.5 LIPASE: N/A       Imaging results reviewed over the past 24 hrs:   No results found for this or any previous visit (from the past 24 hour(s)).

## 2023-07-02 NOTE — PLAN OF CARE
Goal Outcome Evaluation:         IMC status. A&Ox4. SBA. Insulin and IV fluids infusing. Pain in left ankle and abdomen. Denies CP or SOB. PRN Dilaudid given for pain. Q2h VS and hourly BG. Clear liquid diet was advanced to full liquid. Will continue to follow POC.

## 2023-07-03 VITALS
BODY MASS INDEX: 28.67 KG/M2 | OXYGEN SATURATION: 98 % | RESPIRATION RATE: 20 BRPM | WEIGHT: 204.81 LBS | DIASTOLIC BLOOD PRESSURE: 76 MMHG | SYSTOLIC BLOOD PRESSURE: 111 MMHG | TEMPERATURE: 98.2 F | HEIGHT: 71 IN | HEART RATE: 94 BPM

## 2023-07-03 LAB
GLUCOSE BLDC GLUCOMTR-MCNC: 258 MG/DL (ref 70–99)
GLUCOSE BLDC GLUCOMTR-MCNC: 266 MG/DL (ref 70–99)
GLUCOSE BLDC GLUCOMTR-MCNC: 294 MG/DL (ref 70–99)
MAGNESIUM SERPL-MCNC: 1.9 MG/DL (ref 1.7–2.3)
PHOSPHATE SERPL-MCNC: 4.5 MG/DL (ref 2.5–4.5)
POTASSIUM SERPL-SCNC: 4.5 MMOL/L (ref 3.4–5.3)

## 2023-07-03 PROCEDURE — 84100 ASSAY OF PHOSPHORUS: CPT | Performed by: INTERNAL MEDICINE

## 2023-07-03 PROCEDURE — 83735 ASSAY OF MAGNESIUM: CPT | Performed by: INTERNAL MEDICINE

## 2023-07-03 PROCEDURE — 84132 ASSAY OF SERUM POTASSIUM: CPT | Performed by: INTERNAL MEDICINE

## 2023-07-03 PROCEDURE — 99239 HOSP IP/OBS DSCHRG MGMT >30: CPT | Performed by: HOSPITALIST

## 2023-07-03 PROCEDURE — 36415 COLL VENOUS BLD VENIPUNCTURE: CPT | Performed by: INTERNAL MEDICINE

## 2023-07-03 PROCEDURE — 250N000012 HC RX MED GY IP 250 OP 636 PS 637: Performed by: INTERNAL MEDICINE

## 2023-07-03 PROCEDURE — 250N000013 HC RX MED GY IP 250 OP 250 PS 637: Performed by: INTERNAL MEDICINE

## 2023-07-03 RX ORDER — AMOXICILLIN 250 MG
1 CAPSULE ORAL DAILY
Qty: 10 TABLET | Refills: 0 | Status: SHIPPED | OUTPATIENT
Start: 2023-07-03 | End: 2023-07-14

## 2023-07-03 RX ORDER — FOLIC ACID 1 MG/1
1 TABLET ORAL DAILY
Qty: 30 TABLET | Refills: 0 | Status: SHIPPED | OUTPATIENT
Start: 2023-07-04 | End: 2023-08-03

## 2023-07-03 RX ORDER — MULTIVITAMIN,THERAPEUTIC
1 TABLET ORAL DAILY
Qty: 30 TABLET | Refills: 0 | Status: SHIPPED | OUTPATIENT
Start: 2023-07-04 | End: 2023-08-03

## 2023-07-03 RX ORDER — OXYCODONE HYDROCHLORIDE 5 MG/1
5 TABLET ORAL EVERY 6 HOURS PRN
Qty: 8 TABLET | Refills: 0 | Status: SHIPPED | OUTPATIENT
Start: 2023-07-03 | End: 2023-07-06

## 2023-07-03 RX ORDER — GEMFIBROZIL 600 MG/1
600 TABLET, FILM COATED ORAL
Qty: 120 TABLET | Refills: 0 | Status: SHIPPED | OUTPATIENT
Start: 2023-07-03 | End: 2023-09-01

## 2023-07-03 RX ORDER — LANOLIN ALCOHOL/MO/W.PET/CERES
100 CREAM (GRAM) TOPICAL DAILY
Qty: 30 TABLET | Refills: 0 | Status: SHIPPED | OUTPATIENT
Start: 2023-07-04 | End: 2023-08-03

## 2023-07-03 RX ADMIN — THIAMINE HCL TAB 100 MG 100 MG: 100 TAB at 09:13

## 2023-07-03 RX ADMIN — GEMFIBROZIL 600 MG: 600 TABLET ORAL at 09:13

## 2023-07-03 RX ADMIN — ACETAMINOPHEN 650 MG: 325 TABLET, FILM COATED ORAL at 13:03

## 2023-07-03 RX ADMIN — THERA TABS 1 TABLET: TAB at 09:13

## 2023-07-03 RX ADMIN — HYDROMORPHONE HYDROCHLORIDE 4 MG: 2 TABLET ORAL at 05:59

## 2023-07-03 RX ADMIN — HYDROMORPHONE HYDROCHLORIDE 4 MG: 2 TABLET ORAL at 13:03

## 2023-07-03 RX ADMIN — HYDROMORPHONE HYDROCHLORIDE 4 MG: 2 TABLET ORAL at 03:00

## 2023-07-03 RX ADMIN — INSULIN GLARGINE 15 UNITS: 100 INJECTION, SOLUTION SUBCUTANEOUS at 09:22

## 2023-07-03 RX ADMIN — INSULIN ASPART 4 UNITS: 100 INJECTION, SOLUTION INTRAVENOUS; SUBCUTANEOUS at 09:20

## 2023-07-03 RX ADMIN — CALCIUM 500 MG: 500 TABLET ORAL at 09:17

## 2023-07-03 RX ADMIN — INSULIN ASPART 3 UNITS: 100 INJECTION, SOLUTION INTRAVENOUS; SUBCUTANEOUS at 13:22

## 2023-07-03 RX ADMIN — FOLIC ACID 1 MG: 1 TABLET ORAL at 09:15

## 2023-07-03 ASSESSMENT — ACTIVITIES OF DAILY LIVING (ADL)
ADLS_ACUITY_SCORE: 20

## 2023-07-03 NOTE — DISCHARGE SUMMARY
[facility]  Hospitalist Discharge Summary      Date of Admission:  6/28/2023  Date of Discharge:  7/3/2023  Discharging Provider: Delgado Billingsley DO  Discharge Service: Hospitalist Service    Discharge Diagnoses   Acute pancreatitis w/recurrent fever  Hypertriglyceridemia  New onset DMT2  Diabetic ketoacidosis  Hypokalemia, hypophos, hyponatremia, hypocalcemia, hypomag  Alcohol dependence, abuse  L ankle pain    Follow-ups Needed After Discharge   Follow-up Appointments     Follow-up and recommended labs and tests       Follow up with primary care provider, [facility],   within 7 days for hospital follow- up.  No follow up labs or test are   needed.    Follow up with GI team as directed          Unresulted Labs Ordered in the Past 30 Days of this Admission     Date and Time Order Name Status Description    6/29/2023  2:49 PM Blood Culture Arm, Right Preliminary     6/29/2023  2:49 PM Blood Culture Arm, Right Preliminary       These results will be followed up by PCP    Discharge Disposition   Discharged to home  Condition at discharge: Stable    Hospital Course   Josselyn Brewster is a 42 year old male patient with past medical history of hypertension, hyperlipidemia, depression, spinal stenosis, history of cocaine dependence and polysubstance abuse, osteoarthritis, came to emergency room with complaint of abdominal pain.  Found to have acute pancreatitis, new diagnosis diabetes mellitus, and severe hypertriglyceridemia.     Acute pancreatitis.  Hypertriglyceridemia  -Possible alcohol induced versus more likely due to severe hypertriglyceridemia  -previously on insulin drip and IVF, now stopped.  -Gastroenterology consult appreciated.  -Triglycerides now less than 500  -cont gemfibrozil on discharge  -tolerating diet but still with some mild abd pain so dishcarge with small amount of oral narcotics, stools softern     New onset diabetes mellitus.  Diabetic  ketoacidosis.  -insulin drip stopped as above  -cont glargine insulin 15 units daily and ISS and will follow up closely with PCP for further adjustments to regimen  -Home supplies ordered from pharmacy      Hypokalemia.  Hypomagnesemia.  Hypocalcemia.  Hypophosphatemia.  Hyponatremia.  -2/2 alcohol abuse, repleted per protocols and additional agents given (calcium gluconate)  -improved     Alcohol use disorder.  -No obvious withdrawals.  -Would benefit from long-term alcohol cessation or at least reduction.  -Continue folic acid 1 mg a day, multivitamin, thiamine 100 mg a day while in hospital.     Previous fevers.  -2/2 pancreatitis +/- atelectasis  -Blood cultures from 6/29 with no growth to date  -chest x-ray with no acute abnormalities.  -Urinalysis 6/29 unremarkable.  -recommend tylenol     Left ankle pain and swelling.  -LLE Doppler ultrasound negative for DVT.  -Left ankle x-ray negative for acute fractures.  -Uric acid not elevated.  -Suspect musculoskeletal pain.  -Pain medications as needed.    Consultations This Hospital Stay   GASTROENTEROLOGY IP CONSULT    Code Status   Full Code    Time Spent on this Encounter   I, Delgado Billingsley DO, personally saw the patient today and spent greater than 30 minutes discharging this patient.       Delgado Billingsley DO  [place] [address] [facility]  [address] [place] [address]  Phone: [phone]  Fax: [phone]  ______________________________________________________________________    Physical Exam   Vital Signs: Temp: 98.2  F (36.8  C) Temp src: Oral BP: 111/76 Pulse: 94   Resp: 20 SpO2: 98 % O2 Device: None (Room air)    Weight: 204 lbs 12.92 oz  Face to face completed day of discharge       Primary Care Physician   [facility]    Discharge Orders      Reason for your hospital stay    Treated for acute pancreatitis related to newly found DMT2 and high triglycerides. Cont gemfibrozil. DM diet and lantus, ISS  with close follow up with PCP to adjust regimen as needed. PRN pain control with tylenol but small amount of oral oxy given as well with stool softener while on it. Recommend complete alcohol cessation.     Follow-up and recommended labs and tests     [facility] days for hospital follow- up.  No follow up labs or test are needed.    Follow up with GI team as directed     Activity    Your activity upon discharge: activity as tolerated     Diet    Follow this diet upon discharge: Orders Placed This Encounter      Combination Diet Moderate Consistent Carb (60 g CHO per Meal) Diet; Low Saturated Fat Diet       Significant Results and Procedures   Most Recent 3 CBC's:Recent Labs   Lab Test 07/01/23  0634 06/30/23  0539 06/28/23  0757   WBC 5.7 5.4 7.1   HGB 11.2* 11.5* 12.6*   MCV 82 80 82    207 197     Most Recent 3 BMP's:Recent Labs   Lab Test 07/03/23  1213 07/03/23  0738 07/03/23  0630 07/03/23  0142 07/02/23  0709 07/02/23  0700 07/01/23  0650 07/01/23  0634 06/30/23  0552 06/30/23  0546   NA  --   --   --   --   --  133*  --  134*  --  131*   POTASSIUM  --   --  4.5  --   --  4.5  --  4.3  --  3.6   CHLORIDE  --   --   --   --   --  98  --  99  --  99   CO2  --   --   --   --   --  24  --  26  --  24   BUN  --   --   --   --   --  1.6*  --  1.6*  --  2.0*   CR  --   --   --   --   --  0.88  --  0.81  --  0.85   ANIONGAP  --   --   --   --   --  11  --  9  --  8   DEYA  --   --   --   --   --  9.1  --  9.0  --  9.1   * 294*  --  266*   < > 225*   < > 205*   < > 124*    < > = values in this interval not displayed.     Most Recent 2 LFT's:Recent Labs   Lab Test 07/02/23  0700 07/01/23  0634   AST 52* 46*   ALT 52 40   ALKPHOS 80 75   BILITOTAL 0.7 0.9     Most Recent 3 INR's:No lab results found.  Most Recent 3 Troponin's:Recent Labs   Lab Test 06/29/21  0736 06/29/21  0357   TROPI <0.015 <0.015     Most Recent 3 BNP's:No lab results  found.  Most Recent D-dimer:Recent Labs   Lab Test 06/29/21  0736   DD <0.3   ,   Results for orders placed or performed during the hospital encounter of 06/28/23   CT Abdomen Pelvis w Contrast    Narrative    EXAM: CT ABDOMEN PELVIS W CONTRAST  LOCATION: [facility]  DATE: 6/28/2023    INDICATION: right sided abdominal pain  COMPARISON: None.  TECHNIQUE: CT scan of the abdomen and pelvis was performed following injection of IV contrast. Multiplanar reformats were obtained. Dose reduction techniques were used.  CONTRAST: 100mL Isovue 370    FINDINGS:   LOWER CHEST: Normal.    HEPATOBILIARY: Steatosis.    PANCREAS: Edematous changes pancreatic head/uncinate process with adjacent stranding edema.    SPLEEN: Normal.    ADRENAL GLANDS: Normal.    KIDNEYS/BLADDER: Normal.    BOWEL: Appendix normal.    LYMPH NODES: Normal.    VASCULATURE: Atherosclerosis.    PELVIC ORGANS: Normal.    MUSCULOSKELETAL: Normal.      Impression    IMPRESSION:   1.  Acute pancreatitis head/uncinate process.  2.  Hepatic steatosis.   US Abdomen Limited (RUQ)    Narrative    EXAM: US ABDOMEN LIMITED  LOCATION: [facility]  DATE: 6/28/2023    INDICATION: pancreatitis, rule out obstruction  COMPARISON: CT abdomen and pelvis 06/28/2023.  TECHNIQUE: Limited abdominal ultrasound.    FINDINGS:    GALLBLADDER: Normal. No gallstones, wall thickening, or pericholecystic fluid. Negative sonographic Chang's sign.    BILE DUCTS: No biliary dilatation. The common duct measures 4 mm.    LIVER: Echogenic parenchyma. No focal mass.    RIGHT KIDNEY: No hydronephrosis.    PANCREAS: The visualized portions are normal.    No ascites.      Impression    IMPRESSION:  1.  Hepatic steatosis.  2.  Normal gallbladder.       XR Chest Port 1 View    Narrative    CHEST PORTABLE 1 VIEW   6/29/2023 3:19 PM     HISTORY: Fever.    COMPARISON: Chest x-ray on 6/29/2021.      Impression    IMPRESSION: AP view of the chest was obtained.  Cardiomediastinal  silhouette is within normal limits. No suspicious focal pulmonary  opacities. No significant pleural effusion or pneumothorax.     RASHMI ALICEA MD         SYSTEM ID:  [id number]   US Lower Extremity Venous Duplex Left    Narrative    VENOUS ULTRASOUND LEFT LEG  6/30/2023 11:56 AM     HISTORY: left leg swelling    COMPARISON: None.    FINDINGS:  Examination of the deep veins with graded compression and  color flow Doppler with spectral wave form analysis was performed.   There is no evidence for DVT in the left lower extremity.      Impression    IMPRESSION: No evidence of deep venous thrombosis.    RANJITH BEAVER MD         SYSTEM ID:  [id number]   XR Ankle Port Left G/E 3 Views    Narrative    ANKLE PORTABLE LEFT THREE OR MORE VIEWS June 30, 2023 11:02 AM     HISTORY: Left ankle pain.    COMPARISON: None.      Impression    IMPRESSION: Small ossicles distal to the medial and lateral malleoli  from old trauma. No evidence of acute or subacute fracture. Small to  moderate ankle joint osteophytes. Otherwise negative.    TRUMAN MILES MD         SYSTEM ID:  [id number]       Discharge Medications   Current Discharge Medication List      START taking these medications    Details   Alcohol Swabs PADS Use to swab the area of the injection or oliver as directed Per insurance coverage  Qty: 100 each, Refills: 0    Associated Diagnoses: Diabetic ketoacidosis without coma associated with other specified diabetes mellitus (H)      blood glucose (NO BRAND SPECIFIED) lancets standard To use to test glucose level in the blood Use to test blood sugar  4  times daily as directed. To accompany glucose monitor brands per insurance coverage.  Qty: 100 each, Refills: 0    Associated Diagnoses: Diabetic ketoacidosis without coma associated with other specified diabetes mellitus (H)      blood glucose (NO BRAND SPECIFIED) test strip To use to test glucose level in the blood Use to test blood sugar  4 times daily  as directed. To accompany glucose monitor brands per insurance coverage.  Qty: 100 strip, Refills: 0    Associated Diagnoses: Diabetic ketoacidosis without coma associated with other specified diabetes mellitus (H)      blood glucose calibration (NO BRAND SPECIFIED) solution Used to calibrate the blood glucose monitor as needed and as directed.  To accompany  blood glucose brands per insurance coverage  Qty: 1 each, Refills: 0    Associated Diagnoses: Diabetic ketoacidosis without coma associated with other specified diabetes mellitus (H)      blood glucose monitoring (NO BRAND SPECIFIED) meter device kit Use as directed Per insurance coverage  Qty: 1 kit, Refills: 0    Associated Diagnoses: Diabetic ketoacidosis without coma associated with other specified diabetes mellitus (H)      folic acid (FOLVITE) 1 MG tablet Take 1 tablet (1 mg) by mouth daily for 30 days  Qty: 30 tablet, Refills: 0    Associated Diagnoses: Alcohol dependence with unspecified alcohol-induced disorder (H)      gemfibrozil (LOPID) 600 MG tablet Take 1 tablet (600 mg) by mouth 2 times daily (before meals) for 60 days  Qty: 120 tablet, Refills: 0    Associated Diagnoses: Hypertriglyceridemia      insulin aspart (NOVOLOG PEN) 100 UNIT/ML pen Inject 1-7 Units Subcutaneous 3 times daily (before meals)  Qty: 15 mL, Refills: 3    Associated Diagnoses: Diabetic ketoacidosis without coma associated with other specified diabetes mellitus (H)      insulin glargine (LANTUS PEN) 100 UNIT/ML pen Inject 15 Units Subcutaneous At Bedtime  Qty: 15 mL, Refills: 3    Comments: If Lantus is not covered by insurance, may substitute Basaglar or Semglee or other insulin glargine product per insurance preference at same dose and frequency.    Associated Diagnoses: Diabetic ketoacidosis without coma associated with other specified diabetes mellitus (H)      multivitamin, therapeutic (THERA-VIT) TABS tablet Take 1 tablet by mouth daily for 30 days  Qty: 30 tablet,  Refills: 0    Associated Diagnoses: Alcohol dependence with unspecified alcohol-induced disorder (H)      oxyCODONE (ROXICODONE) 5 MG tablet Take 1 tablet (5 mg) by mouth every 6 hours as needed for pain  Qty: 8 tablet, Refills: 0    Associated Diagnoses: Acute pancreatitis, unspecified complication status, unspecified pancreatitis type      senna-docusate (SENOKOT-S/PERICOLACE) 8.6-50 MG tablet Take 1 tablet by mouth daily for 10 days  Qty: 10 tablet, Refills: 0    Associated Diagnoses: Acute pancreatitis, unspecified complication status, unspecified pancreatitis type      Sharps Container MISC Use as directed to dispose of needles, lancets and other sharps  Qty: 1 each, Refills: 0    Associated Diagnoses: Diabetic ketoacidosis without coma associated with other specified diabetes mellitus (H)      thiamine (B-1) 100 MG tablet Take 1 tablet (100 mg) by mouth daily for 30 days  Qty: 30 tablet, Refills: 0    Associated Diagnoses: Alcohol dependence with unspecified alcohol-induced disorder (H)         CONTINUE these medications which have NOT CHANGED    Details   acetaminophen (TYLENOL) 500 MG tablet Take 500-1,000 mg by mouth every 6 hours as needed for mild pain      citalopram (CELEXA) 40 MG tablet Take 40 mg by mouth daily      ibuprofen (ADVIL/MOTRIN) 200 MG tablet Take 200-600 mg by mouth every 4 hours as needed for pain         STOP taking these medications       hydrochlorothiazide (HYDRODIURIL) 25 MG tablet Comments:   Reason for Stopping:         labetalol (NORMODYNE) 100 MG tablet Comments:   Reason for Stopping:             Allergies   Allergies   Allergen Reactions     Levofloxacin Hives     Morphine Other (See Comments)     Other reaction(s): Urinary Retention  Other reaction(s): Urinary Retention

## 2023-07-03 NOTE — PLAN OF CARE
"Goal Outcome Evaluation:      Plan of Care Reviewed With: patient    Overall Patient Progress: improvingOverall Patient Progress: improving         A&Ox4, VSS, SBA on RA, pain controlled with dilaudid and tylenol. K, mag, and phos protocols normal, no replacement needed. Blood sugar checks ACHS. Education provided on checking blood sugar and administering insulin, pt verbalized and demonstrated appropriately. Plan to discharge home today.     Patient discharged home via private car, transported via wheelchair with volunteer services.  Patient verbalized and received copy of discharge instructions.  Patient received medications filled by discharge Pharmacy.  Personal belongings gathered and sent with patient.  VSS. IV removed prior to discharge.         Problem: Plan of Care - These are the overarching goals to be used throughout the patient stay.    Goal: Plan of Care Review  Description: The Plan of Care Review/Shift note should be completed every shift.  The Outcome Evaluation is a brief statement about your assessment that the patient is improving, declining, or no change.  This information will be displayed automatically on your shift note.  Outcome: Adequate for Care Transition  Flowsheets (Taken 7/3/2023 5050)  Plan of Care Reviewed With: patient  Overall Patient Progress: improving  Goal: Patient-Specific Goal (Individualized)  Description: You can add care plan individualizations to a care plan. Examples of Individualization might be:  \"Parent requests to be called daily at 9am for status\", \"I have a hard time hearing out of my right ear\", or \"Do not touch me to wake me up as it startles me\".  Outcome: Adequate for Care Transition  Goal: Absence of Hospital-Acquired Illness or Injury  Outcome: Adequate for Care Transition  Intervention: Identify and Manage Fall Risk  Recent Flowsheet Documentation  Taken 7/3/2023 8734 by Min Alvarado, RN  Safety Promotion/Fall Prevention:   activity supervised   clutter " free environment maintained   increased rounding and observation   increase visualization of patient   lighting adjusted   nonskid shoes/slippers when out of bed   patient and family education   room door open   room organization consistent   safety round/check completed   treat underlying cause  Intervention: Prevent Skin Injury  Recent Flowsheet Documentation  Taken 7/3/2023 0930 by Min Alvarado RN  Body Position:   position changed independently   supine  Intervention: Prevent and Manage VTE (Venous Thromboembolism) Risk  Recent Flowsheet Documentation  Taken 7/3/2023 0930 by Min Alvarado RN  VTE Prevention/Management: SCDs (sequential compression devices) off  Goal: Optimal Comfort and Wellbeing  Outcome: Adequate for Care Transition  Goal: Readiness for Transition of Care  Outcome: Adequate for Care Transition     Problem: Diabetic Ketoacidosis  Goal: Optimal Coping  Outcome: Adequate for Care Transition  Goal: Fluid and Electrolyte Balance with Absence of Ketosis  Outcome: Adequate for Care Transition     Problem: Pancreatitis  Goal: Fluid and Electrolyte Balance  Outcome: Adequate for Care Transition  Goal: Absence of Infection Signs and Symptoms  Outcome: Adequate for Care Transition  Goal: Optimal Nutrition Delivery  Outcome: Adequate for Care Transition  Goal: Optimal Pain Control and Function  Outcome: Adequate for Care Transition  Goal: Effective Oxygenation and Ventilation  Outcome: Adequate for Care Transition  Intervention: Optimize Oxygenation and Ventilation  Recent Flowsheet Documentation  Taken 7/3/2023 0930 by Min Alvarado RN  Cough And Deep Breathing: done independently per patient  Activity Management: activity adjusted per tolerance  Head of Bed (HOB) Positioning: HOB at 15 degrees

## 2023-07-03 NOTE — PLAN OF CARE
A&Ox4. Up SBA when OOB. No IV access. Complaints of pain in RUQ of his abdomen and L ankle. PRN Dilaudid given with relief. On RA. On a low fat diet. Low grade fever overnight- given Tylenol and temp came down. CIWAS were a 1 overnight. On telemetry- NSR.

## 2023-07-04 LAB
BACTERIA BLD CULT: NO GROWTH
BACTERIA BLD CULT: NO GROWTH

## 2023-07-13 ENCOUNTER — APPOINTMENT (OUTPATIENT)
Dept: CT IMAGING | Facility: CLINIC | Age: 43
End: 2023-07-13
Attending: EMERGENCY MEDICINE
Payer: COMMERCIAL

## 2023-07-13 ENCOUNTER — HOSPITAL ENCOUNTER (INPATIENT)
Facility: CLINIC | Age: 43
LOS: 3 days | Discharge: HOME | End: 2023-07-16
Attending: EMERGENCY MEDICINE | Admitting: INTERNAL MEDICINE
Payer: COMMERCIAL

## 2023-07-13 DIAGNOSIS — R73.9 HYPERGLYCEMIA: ICD-10-CM

## 2023-07-13 DIAGNOSIS — K85.90 ACUTE PANCREATITIS, UNSPECIFIED COMPLICATION STATUS, UNSPECIFIED PANCREATITIS TYPE: Primary | ICD-10-CM

## 2023-07-13 DIAGNOSIS — Z79.4 TYPE 2 DIABETES MELLITUS WITH HYPERGLYCEMIA, WITH LONG-TERM CURRENT USE OF INSULIN (H): ICD-10-CM

## 2023-07-13 DIAGNOSIS — E78.1 HYPERTRIGLYCERIDEMIA: ICD-10-CM

## 2023-07-13 DIAGNOSIS — E11.65 TYPE 2 DIABETES MELLITUS WITH HYPERGLYCEMIA, WITH LONG-TERM CURRENT USE OF INSULIN (H): ICD-10-CM

## 2023-07-13 DIAGNOSIS — K86.3 PANCREATIC PSEUDOCYST: ICD-10-CM

## 2023-07-13 LAB
ALBUMIN SERPL BCG-MCNC: 5 G/DL (ref 3.5–5.2)
ALP SERPL-CCNC: 104 U/L (ref 40–129)
ALT SERPL W P-5'-P-CCNC: 75 U/L (ref 0–70)
ANION GAP SERPL CALCULATED.3IONS-SCNC: 12 MMOL/L (ref 7–15)
AST SERPL W P-5'-P-CCNC: 56 U/L (ref 0–45)
BASOPHILS # BLD AUTO: 0.1 10E3/UL (ref 0–0.2)
BASOPHILS NFR BLD AUTO: 1 %
BILIRUB DIRECT SERPL-MCNC: <0.2 MG/DL (ref 0–0.3)
BILIRUB SERPL-MCNC: 0.3 MG/DL
BUN SERPL-MCNC: 18 MG/DL (ref 6–20)
CALCIUM SERPL-MCNC: 10.4 MG/DL (ref 8.6–10)
CHLORIDE SERPL-SCNC: 102 MMOL/L (ref 98–107)
CREAT SERPL-MCNC: 0.86 MG/DL (ref 0.67–1.17)
DEPRECATED HCO3 PLAS-SCNC: 23 MMOL/L (ref 22–29)
EOSINOPHIL # BLD AUTO: 0.1 10E3/UL (ref 0–0.7)
EOSINOPHIL NFR BLD AUTO: 1 %
ERYTHROCYTE [DISTWIDTH] IN BLOOD BY AUTOMATED COUNT: 14.4 % (ref 10–15)
GFR SERPL CREATININE-BSD FRML MDRD: >90 ML/MIN/1.73M2
GLUCOSE BLDC GLUCOMTR-MCNC: 199 MG/DL (ref 70–99)
GLUCOSE SERPL-MCNC: 201 MG/DL (ref 70–99)
HCT VFR BLD AUTO: 38.7 % (ref 40–53)
HGB BLD-MCNC: 12.7 G/DL (ref 13.3–17.7)
HOLD SPECIMEN: NORMAL
IMM GRANULOCYTES # BLD: 0.1 10E3/UL
IMM GRANULOCYTES NFR BLD: 1 %
LIPASE SERPL-CCNC: 72 U/L (ref 13–60)
LYMPHOCYTES # BLD AUTO: 2 10E3/UL (ref 0.8–5.3)
LYMPHOCYTES NFR BLD AUTO: 35 %
MCH RBC QN AUTO: 26.1 PG (ref 26.5–33)
MCHC RBC AUTO-ENTMCNC: 32.8 G/DL (ref 31.5–36.5)
MCV RBC AUTO: 80 FL (ref 78–100)
MONOCYTES # BLD AUTO: 0.3 10E3/UL (ref 0–1.3)
MONOCYTES NFR BLD AUTO: 6 %
NEUTROPHILS # BLD AUTO: 3.2 10E3/UL (ref 1.6–8.3)
NEUTROPHILS NFR BLD AUTO: 56 %
NRBC # BLD AUTO: 0 10E3/UL
NRBC BLD AUTO-RTO: 0 /100
PLATELET # BLD AUTO: 553 10E3/UL (ref 150–450)
POTASSIUM SERPL-SCNC: 4.2 MMOL/L (ref 3.4–5.3)
PROT SERPL-MCNC: 8.6 G/DL (ref 6.4–8.3)
RBC # BLD AUTO: 4.86 10E6/UL (ref 4.4–5.9)
SODIUM SERPL-SCNC: 137 MMOL/L (ref 136–145)
WBC # BLD AUTO: 5.7 10E3/UL (ref 4–11)

## 2023-07-13 PROCEDURE — 85025 COMPLETE CBC W/AUTO DIFF WBC: CPT | Performed by: EMERGENCY MEDICINE

## 2023-07-13 PROCEDURE — 83690 ASSAY OF LIPASE: CPT | Performed by: EMERGENCY MEDICINE

## 2023-07-13 PROCEDURE — 82248 BILIRUBIN DIRECT: CPT | Performed by: EMERGENCY MEDICINE

## 2023-07-13 PROCEDURE — 120N000001 HC R&B MED SURG/OB

## 2023-07-13 PROCEDURE — 96374 THER/PROPH/DIAG INJ IV PUSH: CPT | Mod: 59

## 2023-07-13 PROCEDURE — 99285 EMERGENCY DEPT VISIT HI MDM: CPT | Mod: 25

## 2023-07-13 PROCEDURE — 96376 TX/PRO/DX INJ SAME DRUG ADON: CPT

## 2023-07-13 PROCEDURE — 250N000011 HC RX IP 250 OP 636: Mod: JZ | Performed by: EMERGENCY MEDICINE

## 2023-07-13 PROCEDURE — 80053 COMPREHEN METABOLIC PANEL: CPT | Performed by: EMERGENCY MEDICINE

## 2023-07-13 PROCEDURE — 258N000003 HC RX IP 258 OP 636: Performed by: EMERGENCY MEDICINE

## 2023-07-13 PROCEDURE — 74177 CT ABD & PELVIS W/CONTRAST: CPT

## 2023-07-13 PROCEDURE — 96375 TX/PRO/DX INJ NEW DRUG ADDON: CPT

## 2023-07-13 PROCEDURE — 85048 AUTOMATED LEUKOCYTE COUNT: CPT | Performed by: EMERGENCY MEDICINE

## 2023-07-13 PROCEDURE — 84478 ASSAY OF TRIGLYCERIDES: CPT | Performed by: EMERGENCY MEDICINE

## 2023-07-13 PROCEDURE — 82962 GLUCOSE BLOOD TEST: CPT

## 2023-07-13 PROCEDURE — 36415 COLL VENOUS BLD VENIPUNCTURE: CPT | Performed by: EMERGENCY MEDICINE

## 2023-07-13 PROCEDURE — 250N000011 HC RX IP 250 OP 636: Performed by: EMERGENCY MEDICINE

## 2023-07-13 PROCEDURE — 96361 HYDRATE IV INFUSION ADD-ON: CPT

## 2023-07-13 RX ORDER — HYDROMORPHONE HYDROCHLORIDE 1 MG/ML
0.5 INJECTION, SOLUTION INTRAMUSCULAR; INTRAVENOUS; SUBCUTANEOUS ONCE
Status: COMPLETED | OUTPATIENT
Start: 2023-07-13 | End: 2023-07-13

## 2023-07-13 RX ORDER — KETOROLAC TROMETHAMINE 15 MG/ML
15 INJECTION, SOLUTION INTRAMUSCULAR; INTRAVENOUS ONCE
Status: COMPLETED | OUTPATIENT
Start: 2023-07-13 | End: 2023-07-13

## 2023-07-13 RX ORDER — ONDANSETRON 2 MG/ML
4 INJECTION INTRAMUSCULAR; INTRAVENOUS ONCE
Status: COMPLETED | OUTPATIENT
Start: 2023-07-13 | End: 2023-07-13

## 2023-07-13 RX ORDER — IOPAMIDOL 755 MG/ML
500 INJECTION, SOLUTION INTRAVASCULAR ONCE
Status: COMPLETED | OUTPATIENT
Start: 2023-07-13 | End: 2023-07-13

## 2023-07-13 RX ADMIN — ONDANSETRON 4 MG: 2 INJECTION INTRAMUSCULAR; INTRAVENOUS at 21:44

## 2023-07-13 RX ADMIN — HYDROMORPHONE HYDROCHLORIDE 0.5 MG: 1 INJECTION, SOLUTION INTRAMUSCULAR; INTRAVENOUS; SUBCUTANEOUS at 23:37

## 2023-07-13 RX ADMIN — HYDROMORPHONE HYDROCHLORIDE 0.5 MG: 1 INJECTION, SOLUTION INTRAMUSCULAR; INTRAVENOUS; SUBCUTANEOUS at 22:45

## 2023-07-13 RX ADMIN — SODIUM CHLORIDE 1000 ML: 9 INJECTION, SOLUTION INTRAVENOUS at 23:37

## 2023-07-13 RX ADMIN — KETOROLAC TROMETHAMINE 15 MG: 15 INJECTION, SOLUTION INTRAMUSCULAR; INTRAVENOUS at 23:37

## 2023-07-13 RX ADMIN — IOPAMIDOL 99 ML: 755 INJECTION, SOLUTION INTRAVENOUS at 21:32

## 2023-07-13 RX ADMIN — ONDANSETRON 4 MG: 2 INJECTION INTRAMUSCULAR; INTRAVENOUS at 19:15

## 2023-07-13 RX ADMIN — SODIUM CHLORIDE 1000 ML: 9 INJECTION, SOLUTION INTRAVENOUS at 21:44

## 2023-07-13 ASSESSMENT — ACTIVITIES OF DAILY LIVING (ADL)
ADLS_ACUITY_SCORE: 35
ADLS_ACUITY_SCORE: 33

## 2023-07-14 LAB
ALBUMIN UR-MCNC: 20 MG/DL
ANION GAP SERPL CALCULATED.3IONS-SCNC: 10 MMOL/L (ref 7–15)
APPEARANCE UR: CLEAR
BILIRUB UR QL STRIP: NEGATIVE
BUN SERPL-MCNC: 19.8 MG/DL (ref 6–20)
CALCIUM SERPL-MCNC: 8.9 MG/DL (ref 8.6–10)
CHLORIDE SERPL-SCNC: 104 MMOL/L (ref 98–107)
COLOR UR AUTO: YELLOW
CREAT SERPL-MCNC: 0.79 MG/DL (ref 0.67–1.17)
DEPRECATED HCO3 PLAS-SCNC: 24 MMOL/L (ref 22–29)
GFR SERPL CREATININE-BSD FRML MDRD: >90 ML/MIN/1.73M2
GLUCOSE BLDC GLUCOMTR-MCNC: 107 MG/DL (ref 70–99)
GLUCOSE BLDC GLUCOMTR-MCNC: 110 MG/DL (ref 70–99)
GLUCOSE BLDC GLUCOMTR-MCNC: 119 MG/DL (ref 70–99)
GLUCOSE BLDC GLUCOMTR-MCNC: 126 MG/DL (ref 70–99)
GLUCOSE BLDC GLUCOMTR-MCNC: 131 MG/DL (ref 70–99)
GLUCOSE BLDC GLUCOMTR-MCNC: 171 MG/DL (ref 70–99)
GLUCOSE SERPL-MCNC: 135 MG/DL (ref 70–99)
GLUCOSE UR STRIP-MCNC: NEGATIVE MG/DL
HGB UR QL STRIP: NEGATIVE
KETONES UR STRIP-MCNC: NEGATIVE MG/DL
LEUKOCYTE ESTERASE UR QL STRIP: NEGATIVE
MAGNESIUM SERPL-MCNC: 2 MG/DL (ref 1.7–2.3)
MUCOUS THREADS #/AREA URNS LPF: PRESENT /LPF
NITRATE UR QL: NEGATIVE
PH UR STRIP: 6 [PH] (ref 5–7)
POTASSIUM SERPL-SCNC: 4 MMOL/L (ref 3.4–5.3)
RBC URINE: 2 /HPF
SODIUM SERPL-SCNC: 138 MMOL/L (ref 136–145)
SP GR UR STRIP: 1.02 (ref 1–1.03)
SQUAMOUS EPITHELIAL: <1 /HPF
TRIGL SERPL-MCNC: 240 MG/DL
UROBILINOGEN UR STRIP-MCNC: NORMAL MG/DL
WBC URINE: 1 /HPF

## 2023-07-14 PROCEDURE — 99207 PR NO BILLABLE SERVICE THIS VISIT: CPT | Performed by: HOSPITALIST

## 2023-07-14 PROCEDURE — 81001 URINALYSIS AUTO W/SCOPE: CPT | Performed by: INTERNAL MEDICINE

## 2023-07-14 PROCEDURE — 250N000012 HC RX MED GY IP 250 OP 636 PS 637: Performed by: INTERNAL MEDICINE

## 2023-07-14 PROCEDURE — 82962 GLUCOSE BLOOD TEST: CPT

## 2023-07-14 PROCEDURE — 36415 COLL VENOUS BLD VENIPUNCTURE: CPT | Performed by: INTERNAL MEDICINE

## 2023-07-14 PROCEDURE — 120N000001 HC R&B MED SURG/OB

## 2023-07-14 PROCEDURE — 82310 ASSAY OF CALCIUM: CPT | Performed by: INTERNAL MEDICINE

## 2023-07-14 PROCEDURE — 258N000003 HC RX IP 258 OP 636: Performed by: INTERNAL MEDICINE

## 2023-07-14 PROCEDURE — 83735 ASSAY OF MAGNESIUM: CPT | Performed by: HOSPITALIST

## 2023-07-14 PROCEDURE — 99223 1ST HOSP IP/OBS HIGH 75: CPT | Mod: AI | Performed by: INTERNAL MEDICINE

## 2023-07-14 PROCEDURE — 250N000013 HC RX MED GY IP 250 OP 250 PS 637: Performed by: INTERNAL MEDICINE

## 2023-07-14 PROCEDURE — 250N000011 HC RX IP 250 OP 636: Performed by: INTERNAL MEDICINE

## 2023-07-14 PROCEDURE — 250N000011 HC RX IP 250 OP 636: Performed by: HOSPITALIST

## 2023-07-14 RX ORDER — PROCHLORPERAZINE 25 MG/1
SUPPOSITORY RECTAL
Qty: 1 EACH | Refills: 1 | Status: SHIPPED | OUTPATIENT
Start: 2023-07-14

## 2023-07-14 RX ORDER — GEMFIBROZIL 600 MG/1
600 TABLET, FILM COATED ORAL
Status: DISCONTINUED | OUTPATIENT
Start: 2023-07-14 | End: 2023-07-16 | Stop reason: HOSPADM

## 2023-07-14 RX ORDER — SODIUM CHLORIDE, SODIUM LACTATE, POTASSIUM CHLORIDE, CALCIUM CHLORIDE 600; 310; 30; 20 MG/100ML; MG/100ML; MG/100ML; MG/100ML
INJECTION, SOLUTION INTRAVENOUS CONTINUOUS
Status: DISCONTINUED | OUTPATIENT
Start: 2023-07-14 | End: 2023-07-16

## 2023-07-14 RX ORDER — PROCHLORPERAZINE 25 MG
25 SUPPOSITORY, RECTAL RECTAL EVERY 12 HOURS PRN
Status: DISCONTINUED | OUTPATIENT
Start: 2023-07-14 | End: 2023-07-16 | Stop reason: HOSPADM

## 2023-07-14 RX ORDER — HYDROMORPHONE HYDROCHLORIDE 1 MG/ML
1 INJECTION, SOLUTION INTRAMUSCULAR; INTRAVENOUS; SUBCUTANEOUS
Status: DISCONTINUED | OUTPATIENT
Start: 2023-07-14 | End: 2023-07-14

## 2023-07-14 RX ORDER — OXYCODONE HYDROCHLORIDE 5 MG/1
15 TABLET ORAL EVERY 4 HOURS PRN
Status: DISCONTINUED | OUTPATIENT
Start: 2023-07-14 | End: 2023-07-15

## 2023-07-14 RX ORDER — ONDANSETRON 4 MG/1
4 TABLET, ORALLY DISINTEGRATING ORAL EVERY 6 HOURS PRN
Status: DISCONTINUED | OUTPATIENT
Start: 2023-07-14 | End: 2023-07-16 | Stop reason: HOSPADM

## 2023-07-14 RX ORDER — NALOXONE HYDROCHLORIDE 0.4 MG/ML
0.2 INJECTION, SOLUTION INTRAMUSCULAR; INTRAVENOUS; SUBCUTANEOUS
Status: DISCONTINUED | OUTPATIENT
Start: 2023-07-14 | End: 2023-07-16 | Stop reason: HOSPADM

## 2023-07-14 RX ORDER — PROCHLORPERAZINE 25 MG/1
SUPPOSITORY RECTAL
Qty: 3 EACH | Refills: 5 | Status: SHIPPED | OUTPATIENT
Start: 2023-07-14

## 2023-07-14 RX ORDER — ONDANSETRON 2 MG/ML
4 INJECTION INTRAMUSCULAR; INTRAVENOUS EVERY 6 HOURS PRN
Status: DISCONTINUED | OUTPATIENT
Start: 2023-07-14 | End: 2023-07-16 | Stop reason: HOSPADM

## 2023-07-14 RX ORDER — NICOTINE POLACRILEX 4 MG
15-30 LOZENGE BUCCAL
Status: DISCONTINUED | OUTPATIENT
Start: 2023-07-14 | End: 2023-07-16 | Stop reason: HOSPADM

## 2023-07-14 RX ORDER — LIDOCAINE 40 MG/G
CREAM TOPICAL
Status: DISCONTINUED | OUTPATIENT
Start: 2023-07-14 | End: 2023-07-16 | Stop reason: HOSPADM

## 2023-07-14 RX ORDER — HYDROMORPHONE HYDROCHLORIDE 1 MG/ML
1 INJECTION, SOLUTION INTRAMUSCULAR; INTRAVENOUS; SUBCUTANEOUS
Status: DISCONTINUED | OUTPATIENT
Start: 2023-07-14 | End: 2023-07-15

## 2023-07-14 RX ORDER — OXYCODONE HYDROCHLORIDE 5 MG/1
10 TABLET ORAL EVERY 4 HOURS PRN
Status: DISCONTINUED | OUTPATIENT
Start: 2023-07-14 | End: 2023-07-15

## 2023-07-14 RX ORDER — NALOXONE HYDROCHLORIDE 0.4 MG/ML
0.4 INJECTION, SOLUTION INTRAMUSCULAR; INTRAVENOUS; SUBCUTANEOUS
Status: DISCONTINUED | OUTPATIENT
Start: 2023-07-14 | End: 2023-07-16 | Stop reason: HOSPADM

## 2023-07-14 RX ORDER — CITALOPRAM HYDROBROMIDE 20 MG/1
40 TABLET ORAL DAILY
Status: DISCONTINUED | OUTPATIENT
Start: 2023-07-14 | End: 2023-07-16 | Stop reason: HOSPADM

## 2023-07-14 RX ORDER — DEXTROSE MONOHYDRATE 25 G/50ML
25-50 INJECTION, SOLUTION INTRAVENOUS
Status: DISCONTINUED | OUTPATIENT
Start: 2023-07-14 | End: 2023-07-16 | Stop reason: HOSPADM

## 2023-07-14 RX ORDER — HYDROMORPHONE HYDROCHLORIDE 1 MG/ML
0.5 INJECTION, SOLUTION INTRAMUSCULAR; INTRAVENOUS; SUBCUTANEOUS
Status: DISCONTINUED | OUTPATIENT
Start: 2023-07-14 | End: 2023-07-15

## 2023-07-14 RX ORDER — PROCHLORPERAZINE MALEATE 5 MG
10 TABLET ORAL EVERY 6 HOURS PRN
Status: DISCONTINUED | OUTPATIENT
Start: 2023-07-14 | End: 2023-07-16 | Stop reason: HOSPADM

## 2023-07-14 RX ORDER — PROCHLORPERAZINE 25 MG/1
SUPPOSITORY RECTAL
Qty: 1 EACH | Refills: 0 | Status: SHIPPED | OUTPATIENT
Start: 2023-07-14

## 2023-07-14 RX ADMIN — HYDROMORPHONE HYDROCHLORIDE 1 MG: 1 INJECTION, SOLUTION INTRAMUSCULAR; INTRAVENOUS; SUBCUTANEOUS at 14:54

## 2023-07-14 RX ADMIN — CITALOPRAM HYDROBROMIDE 40 MG: 20 TABLET ORAL at 09:41

## 2023-07-14 RX ADMIN — OXYCODONE HYDROCHLORIDE 15 MG: 5 TABLET ORAL at 22:52

## 2023-07-14 RX ADMIN — HYDROMORPHONE HYDROCHLORIDE 0.5 MG: 1 INJECTION, SOLUTION INTRAMUSCULAR; INTRAVENOUS; SUBCUTANEOUS at 19:05

## 2023-07-14 RX ADMIN — SODIUM CHLORIDE, POTASSIUM CHLORIDE, SODIUM LACTATE AND CALCIUM CHLORIDE: 600; 310; 30; 20 INJECTION, SOLUTION INTRAVENOUS at 18:03

## 2023-07-14 RX ADMIN — HYDROMORPHONE HYDROCHLORIDE 0.5 MG: 1 INJECTION, SOLUTION INTRAMUSCULAR; INTRAVENOUS; SUBCUTANEOUS at 01:24

## 2023-07-14 RX ADMIN — OXYCODONE HYDROCHLORIDE 15 MG: 5 TABLET ORAL at 04:51

## 2023-07-14 RX ADMIN — INSULIN ASPART 2 UNITS: 100 INJECTION, SOLUTION INTRAVENOUS; SUBCUTANEOUS at 01:27

## 2023-07-14 RX ADMIN — HYDROMORPHONE HYDROCHLORIDE 1 MG: 1 INJECTION, SOLUTION INTRAMUSCULAR; INTRAVENOUS; SUBCUTANEOUS at 02:56

## 2023-07-14 RX ADMIN — GEMFIBROZIL 600 MG: 600 TABLET ORAL at 09:42

## 2023-07-14 RX ADMIN — HYDROMORPHONE HYDROCHLORIDE 1 MG: 1 INJECTION, SOLUTION INTRAMUSCULAR; INTRAVENOUS; SUBCUTANEOUS at 10:25

## 2023-07-14 RX ADMIN — OXYCODONE HYDROCHLORIDE 15 MG: 5 TABLET ORAL at 08:51

## 2023-07-14 RX ADMIN — HYDROMORPHONE HYDROCHLORIDE 1 MG: 1 INJECTION, SOLUTION INTRAMUSCULAR; INTRAVENOUS; SUBCUTANEOUS at 21:10

## 2023-07-14 RX ADMIN — GEMFIBROZIL 600 MG: 600 TABLET ORAL at 17:15

## 2023-07-14 RX ADMIN — OXYCODONE HYDROCHLORIDE 15 MG: 5 TABLET ORAL at 17:20

## 2023-07-14 RX ADMIN — INSULIN GLARGINE 15 UNITS: 100 INJECTION, SOLUTION SUBCUTANEOUS at 22:47

## 2023-07-14 RX ADMIN — HYDROMORPHONE HYDROCHLORIDE 1 MG: 1 INJECTION, SOLUTION INTRAMUSCULAR; INTRAVENOUS; SUBCUTANEOUS at 06:43

## 2023-07-14 RX ADMIN — SODIUM CHLORIDE, POTASSIUM CHLORIDE, SODIUM LACTATE AND CALCIUM CHLORIDE: 600; 310; 30; 20 INJECTION, SOLUTION INTRAVENOUS at 09:46

## 2023-07-14 RX ADMIN — SODIUM CHLORIDE, POTASSIUM CHLORIDE, SODIUM LACTATE AND CALCIUM CHLORIDE: 600; 310; 30; 20 INJECTION, SOLUTION INTRAVENOUS at 00:58

## 2023-07-14 ASSESSMENT — ACTIVITIES OF DAILY LIVING (ADL)
ADLS_ACUITY_SCORE: 35

## 2023-07-14 NOTE — UTILIZATION REVIEW
"  Admission Status; Secondary Review Determination     Admission Date: 7/13/2023  8:43 PM      Under the authority of the Utilization Management Committee, the utilization review process indicated a secondary review on the above patient.  The review outcome is based on review of the medical records, discussions with staff, and applying clinical experience noted on the date of the review.        (x)      Inpatient Status Appropriate - This patient's medical care is consistent with medical management for inpatient care and reasonable inpatient medical practice.      () Observation Status Appropriate - This patient does not meet hospital inpatient criteria and is placed in observation status. If this patient's primary payer is Medicare and was admitted as an inpatient, Condition Code 44 should be used and patient status changed to \"observation\".   () Admission Status NOT Appropriate - This patient's medical care is not consistent with medical management for Inpatient or Observation Status.          RATIONALE FOR DETERMINATION   Mr. Brewster is a 42-year-old male who presented to the emergency room with abdominal pain.  Found to have acute pancreatitis.  He is requiring n.p.o. diet status.  On continuous IV fluids.  Continues to require multiple doses of IV Dilaudid for pain control.  He is expected to require prolonged hospital stay.  Due to this patient's acute pancreatitis, need for n.p.o. diet status, need for continuous IV fluids, continued need for multiple doses of IV Dilaudid, and expectation of a prolonged hospital stay, it is appropriate to have him admitted to the hospital as an inpatient.      The severity of illness, intensity of service provided, expected LOS and risk for adverse outcome make the care complex, high risk and appropriate for hospital admission.        The information on this document is developed by the utilization review team in order for the business office to ensure compliance.  This only " denotes the appropriateness of proper admission status and does not reflect the quality of care rendered.         The definitions of Inpatient Status and Observation Status used in making the determination above are those provided in the CMS Coverage Manual, Chapter 1 and Chapter 6, section 70.4.      Sincerely,     Jb Carcamo D.O.  Utilization Review/ Case Management  [facility].

## 2023-07-14 NOTE — PLAN OF CARE
ROOM # [id number]      Living Situation (if not independent, order SW consult):  Facility name:  : N/A    Activity level at baseline: Ind  Activity level on admit: Ind    Who will be transporting you at discharge: Joy REEDER    Patient registered to observation; given Patient Bill of Rights; given the opportunity to ask questions about observation status and their plan of care.  Patient has been oriented to the observation room, bathroom and call light is in place.    Discussed discharge goals and expectations with patient/family.         Goal Outcome Evaluation:

## 2023-07-14 NOTE — ED NOTES
[facility]  ED Nurse Handoff Report    ED Chief complaint: Abdominal Pain  . ED Diagnosis:   Final diagnoses:   Acute pancreatitis, unspecified complication status, unspecified pancreatitis type   Hyperglycemia   Pancreatic pseudocyst       Allergies:   Allergies   Allergen Reactions     Levofloxacin Hives     Morphine Other (See Comments)     Other reaction(s): Urinary Retention  Other reaction(s): Urinary Retention       Code Status: Full Code    Activity level - Baseline/Home:  independent.  Activity Level - Current:   independent.   Lift room needed: No.   Bariatric: No   Needed: No   Isolation: No.   Infection: Not Applicable.     Respiratory status: Room air    Vital Signs (within 30 minutes):   Vitals:    07/13/23 1907 07/13/23 2346   BP: (!) 132/100 (!) 152/99   Pulse: 90 65   Resp: 18    Temp: 98.1  F (36.7  C)    SpO2: 98% 98%       Cardiac Rhythm:  ,      Pain level:    Patient confused: No.   Patient Falls Risk: activity supervised.   Elimination Status:  has not voided in ED      Patient Report - Initial Complaint: Abdominal pain.   Focused Assessment: Josselyn Brewster is a 42 year old male with history of type 2 diabetes, triglyceridemia and pacreatitis who presents to the ED via car with fiance for evaluation of abdominal pain. Patient states his abdominal pain started on 7/12 around 2300. He states this pain is in the upper middle abdomen and doesn't radiate, he describes this pain as sharp. He said it worsened after dinner this evening and tried to take tylenol but it did not help. He does note that he was admitted to the hospital 3 weeks ago for having high triglyceride levels associated with pancreatitis He says he was put on gemfibrozil for his triglyceride levels after the hospital stay. He endorses some nausea. He denies alcohol use, fever, chills, cough, runny nose, chest pain, shortness of breath, vomiting, or diarrhea    Abnormal Results:   Labs Ordered and  Resulted from Time of ED Arrival to Time of ED Departure   BASIC METABOLIC PANEL - Abnormal       Result Value    Sodium 137      Potassium 4.2      Chloride 102      Carbon Dioxide (CO2) 23      Anion Gap 12      Urea Nitrogen 18.0      Creatinine 0.86      Calcium 10.4 (*)     Glucose 201 (*)     GFR Estimate >90     LIPASE - Abnormal    Lipase 72 (*)    CBC WITH PLATELETS AND DIFFERENTIAL - Abnormal    WBC Count 5.7      RBC Count 4.86      Hemoglobin 12.7 (*)     Hematocrit 38.7 (*)     MCV 80      MCH 26.1 (*)     MCHC 32.8      RDW 14.4      Platelet Count 553 (*)     % Neutrophils 56      % Lymphocytes 35      % Monocytes 6      % Eosinophils 1      % Basophils 1      % Immature Granulocytes 1      NRBCs per 100 WBC 0      Absolute Neutrophils 3.2      Absolute Lymphocytes 2.0      Absolute Monocytes 0.3      Absolute Eosinophils 0.1      Absolute Basophils 0.1      Absolute Immature Granulocytes 0.1      Absolute NRBCs 0.0     GLUCOSE BY METER - Abnormal    GLUCOSE BY METER POCT 199 (*)    HEPATIC FUNCTION PANEL - Abnormal    Protein Total 8.6 (*)     Albumin 5.0      Bilirubin Total 0.3      Alkaline Phosphatase 104      AST 56 (*)     ALT 75 (*)     Bilirubin Direct <0.20     ROUTINE UA WITH MICROSCOPIC REFLEX TO CULTURE   TRIGLYCERIDES        CT Abdomen Pelvis w Contrast   Final Result   IMPRESSION:       1.  Mild acute interstitial edematous pancreatitis involving the pancreatic head/uncinate process.      2.  Mildly thick-walled 3.6 cm pseudocyst along the pancreatic head and duodenum related to the recent episode of pancreatitis. This could be sterile or infected.      3.  No other peripancreatic fluid collections.      4.  Diffuse hepatic steatosis.          Treatments provided: IV, fluids, Dilaudid, toradol  Family Comments: partner at bedside  OBS brochure/video discussed/provided to patient:  N/A  ED Medications:   Medications   0.9% sodium chloride BOLUS (1,000 mLs Intravenous $New Bag 7/13/23  2337)   ondansetron (ZOFRAN) injection 4 mg (4 mg Intravenous $Given 7/13/23 1915)   0.9% sodium chloride BOLUS (0 mLs Intravenous Stopped 7/13/23 2342)   ondansetron (ZOFRAN) injection 4 mg (4 mg Intravenous $Given 7/13/23 2144)   sodium chloride (PF) 0.9% PF flush 100 mL (65 mLs Intravenous $Given 7/13/23 2132)   iopamidol (ISOVUE-370) solution 500 mL (99 mLs Intravenous $Given 7/13/23 2132)   HYDROmorphone (PF) (DILAUDID) injection 0.5 mg (0.5 mg Intravenous $Given 7/13/23 2245)   HYDROmorphone (PF) (DILAUDID) injection 0.5 mg (0.5 mg Intravenous $Given 7/13/23 2337)   ketorolac (TORADOL) injection 15 mg (15 mg Intravenous $Given 7/13/23 2337)       Drips infusing:  No  For the majority of the shift this patient was Green.   Interventions performed were NA.    Sepsis treatment initiated: No    Cares/treatment/interventions/medications to be completed following ED care: See MAR    ED Nurse Name: Tanvi Rebollar RN  11:47 PM  RECEIVING UNIT ED HANDOFF REVIEW    Above ED Nurse Handoff Report was reviewed: Yes  Reviewed by: Olimpia Juan RN on July 14, 2023 at 6:44 PM

## 2023-07-14 NOTE — PROGRESS NOTES
Patient is admitted to the hospital with pancreatitis with pseudocyst.  Patient was recently admitted with similar complaints.  Patient is feeling slightly better because he just received pain medication.  He denies nausea, emesis.    Vitals are stable  He appears to be stable and not in any distress.  Lungs-clear to auscultation bilaterally  Heart-S1-S2 normal, murmurs  Abdomen-soft, slightly tender in epigastric region, bowel sounds positive    Assessment  Acute pancreatitis with pseudocyst   May be secondary to uncontrolled diabetes, recent keto diet   I ordered GI consult-as patient was supposed to see them as outpatient   Continue NPO   Continue current pain management  Uncontrolled type 2 diabetes   Continue Lantus   New Accu-Cheks with sliding scale-we will adjust the Lantus according to it   He will need probably Dexcom but it needs to be done as outpatient.  He will need to follow-up with diabetic educator as outpatient   Discussed with patient and his wife who was at bedside.    Nonbillable note  Pippa Castro MD on 7/14/2023 at 1:28 PM

## 2023-07-14 NOTE — ED NOTES
Rapid Assessment Note    History:   Josselyn Brewster is a 42 year old male who presents with abdominal pain. A few weeks ago, he was diagnosed with diabetes and pancreatis. He states having epigastric pain a few days ago after having dinner. His blood sugar was 326 before dinner. He denies vomiting, dizziness, fever, chills, diarrhea, or dysuria.     Exam:   General:  Alert, interactive  Cardiovascular:  Well perfused  Lungs:  No respiratory distress, no accessory muscle use  Abdomen: Epigastric tenderness to palpation. Soft with no guarding.   Neuro:  Moving all 4 extremities  Skin:  Warm, dry  Psych:  Normal affect      Plan of Care:   I evaluated the patient and developed an initial plan of care. I discussed this plan and explained that I, or one of my partners, would be returning to complete the evaluation.         I, Linda Ely, am serving as a scribe to document services personally performed by Wallace Orlando MD, based on my observations and the provider's statements to me.    7/13/2023  [employer] [employer]    Portions of this medical record were completed by a scribe. UPON MY REVIEW AND AUTHENTICATION BY ELECTRONIC SIGNATURE, this confirms (a) I performed the applicable clinical services, and (b) the record is accurate.      Wallace Orlando MD  07/13/23 3494

## 2023-07-14 NOTE — PHARMACY-ADMISSION MEDICATION HISTORY
Pharmacist Admission Medication History    Admission medication history is complete. The information provided in this note is only as accurate as the sources available at the time of the update.    Medication reconciliation/reorder completed by provider prior to medication history? Yes    Information Source(s): Patient, Family member, Hospital records and CareEverywhere/SureScripts via in-person    Pertinent Information: -----    Changes made to PTA medication list:    Added: None    Deleted:  senna/doc 1 tab daily x 10 days    Changed: None    Medication Affordability:  Not including over the counter (OTC) medications, was there a time in the past 3 months when you did not take your medications as prescribed because of cost?: Yes (sildenafil)    Allergies reviewed with patient and updates made in EHR: yes     For patients on insulin therapy:  Do you use sliding scale insulin based on blood sugars? Yes  Do you typically eat three meals a day? Yes, pt states he has been more hungry than normal and snacking between meals  How many times do you check your blood glucose per day? 3        Medication History Completed By: Deann Mendes [facility] 2023 8:43 AM    Prior to Admission medications    Medication Sig Last Dose Taking? Auth Provider Long Term End Date   acetaminophen (TYLENOL) 500 MG tablet Take 500-1,000 mg by mouth every 6 hours as needed for mild pain 2023 at 1600 Yes Unknown, Entered By History     citalopram (CELEXA) 40 MG tablet Take 40 mg by mouth daily 2023 at am Yes Unknown, Entered By History Yes    folic acid (FOLVITE) 1 MG tablet Take 1 tablet (1 mg) by mouth daily for 30 days 2023 at am Yes Delgado Billingsley, DO  8/3/23   gemfibrozil (LOPID) 600 MG tablet Take 1 tablet (600 mg) by mouth 2 times daily (before meals) for 60 days 2023 at am Yes Delgado Billingsley, DO Yes 23   ibuprofen (ADVIL/MOTRIN) 200 MG tablet Take 200-600 mg by mouth every 4 hours as needed for pain  7/13/2023 at 1300 Yes Unknown, Entered By History     insulin aspart (NOVOLOG PEN) 100 UNIT/ML pen Inject 1-7 Units Subcutaneous 3 times daily (before meals) Blood Sugar Low Dose: 60 - 110  No insulin; 111 - 150 2 units; 151 - 200 4 units; 201 - 250 6 units; 251 - 300 8 units; 301 - 350 10 units; >350 12 units (call physician) 7/13/2023 at 1700-10 units Yes Delgado Billingsley, DO Yes    insulin glargine (LANTUS PEN) 100 UNIT/ML pen Inject 15 Units Subcutaneous At Bedtime 7/13/2023 at 2200 Yes Delgado Billingsley,  Yes    multivitamin, therapeutic (THERA-VIT) TABS tablet Take 1 tablet by mouth daily for 30 days 7/13/2023 at am Yes Delgado Billingsley DO  8/3/23   thiamine (B-1) 100 MG tablet Take 1 tablet (100 mg) by mouth daily for 30 days 7/13/2023 at am Yes Delgado Billingsley,   8/3/23

## 2023-07-14 NOTE — ED PROVIDER NOTES
History     Chief Complaint:  Abdominal Pain    The history is provided by the patient.      Josselyn Brewster is a 42 year old male with history of type 2 diabetes, triglyceridemia and pacreatitis who presents to the ED via car with fiance for evaluation of abdominal pain. Patient states his abdominal pain started on 7/12 around 2300. He states this pain is in the upper middle abdomen and doesn't radiate, he describes this pain as sharp. He said it worsened after dinner this evening and tried to take tylenol but it did not help. He does note that he was admitted to the hospital 3 weeks ago for having high triglyceride levels associated with pancreatitis He says he was put on gemfibrozil for his triglyceride levels after the hospital stay. He endorses some nausea. He denies alcohol use, fever, chills, cough, runny nose, chest pain, shortness of breath, vomiting, or diarrhea.     Independent Historian:   None - Patient Only    Review of External Notes:   I reviewed hospital visit note from 6/28/23.    Medications:    Celexa  Flovite  Gemfibrozil  Insulin   Senokot  Norvasc   Cozaar  Lioresal  Valium  Revatio  Vistaril  Zyprexa  Neurontin     Past Medical History:    Arthritis   Type 2 diabetes   Spinal stenosis  Sciatica   Right lumbar radiculopathy   Depression   Cocaine dependence  Hypertension   Hyperlipidemia   Herniated disc   DDD   Osteoarthritis  Drug abuse  Anxiety  Opiate dependence  Suicidal ideation     Past Surgical History:    Hernia repair   Laminectomy   IR lumbar epidural steroid injection x3    Physical Exam     Patient Vitals for the past 24 hrs:   BP Temp Pulse Resp SpO2   07/13/23 1907 (!) 132/100 98.1  F (36.7  C) 90 18 98 %      Physical Exam  Nursing note and vitals reviewed.  Constitutional: Well nourished.  Eyes: Conjunctiva normal.  Pupils are equal, round, and reactive to light.   ENT: Nose normal. Mucous membranes pink and moist.    Neck: Normal range of motion.  CVS: Normal rate, regular  rhythm.  Normal heart sounds.   Pulmonary: Lungs clear to auscultation bilaterally. No wheezes/rales/rhonchi.  GI: Abdomen soft. Mild generalized tenderness. No rigidity or guarding.    MSK: No calf tenderness or swelling.  Neuro: Alert. Follows simple commands.  Skin: Skin is warm and dry. No rash noted.   Psychiatric: Normal affect.     Emergency Department Course     Imaging:  CT Abdomen Pelvis w Contrast   Final Result   IMPRESSION:       1.  Mild acute interstitial edematous pancreatitis involving the pancreatic head/uncinate process.      2.  Mildly thick-walled 3.6 cm pseudocyst along the pancreatic head and duodenum related to the recent episode of pancreatitis. This could be sterile or infected.      3.  No other peripancreatic fluid collections.      4.  Diffuse hepatic steatosis.         Report per radiology    Laboratory:  Labs Ordered and Resulted from Time of ED Arrival to Time of ED Departure   BASIC METABOLIC PANEL - Abnormal       Result Value    Sodium 137      Potassium 4.2      Chloride 102      Carbon Dioxide (CO2) 23      Anion Gap 12      Urea Nitrogen 18.0      Creatinine 0.86      Calcium 10.4 (*)     Glucose 201 (*)     GFR Estimate >90     LIPASE - Abnormal    Lipase 72 (*)    CBC WITH PLATELETS AND DIFFERENTIAL - Abnormal    WBC Count 5.7      RBC Count 4.86      Hemoglobin 12.7 (*)     Hematocrit 38.7 (*)     MCV 80      MCH 26.1 (*)     MCHC 32.8      RDW 14.4      Platelet Count 553 (*)     % Neutrophils 56      % Lymphocytes 35      % Monocytes 6      % Eosinophils 1      % Basophils 1      % Immature Granulocytes 1      NRBCs per 100 WBC 0      Absolute Neutrophils 3.2      Absolute Lymphocytes 2.0      Absolute Monocytes 0.3      Absolute Eosinophils 0.1      Absolute Basophils 0.1      Absolute Immature Granulocytes 0.1      Absolute NRBCs 0.0     GLUCOSE BY METER - Abnormal    GLUCOSE BY METER POCT 199 (*)    HEPATIC FUNCTION PANEL - Abnormal    Protein Total 8.6 (*)     Albumin  5.0      Bilirubin Total 0.3      Alkaline Phosphatase 104      AST 56 (*)     ALT 75 (*)     Bilirubin Direct <0.20     ROUTINE UA WITH MICROSCOPIC REFLEX TO CULTURE   TRIGLYCERIDES      Emergency Department Course & Assessments:     Interventions:  Medications   HYDROmorphone (PF) (DILAUDID) injection 0.5 mg (has no administration in time range)   0.9% sodium chloride BOLUS (has no administration in time range)   ketorolac (TORADOL) injection 15 mg (has no administration in time range)   ondansetron (ZOFRAN) injection 4 mg (4 mg Intravenous $Given 7/13/23 1915)   0.9% sodium chloride BOLUS (1,000 mLs Intravenous $New Bag 7/13/23 2144)   ondansetron (ZOFRAN) injection 4 mg (4 mg Intravenous $Given 7/13/23 2144)   sodium chloride (PF) 0.9% PF flush 100 mL (65 mLs Intravenous $Given 7/13/23 2132)   iopamidol (ISOVUE-370) solution 500 mL (99 mLs Intravenous $Given 7/13/23 2132)   HYDROmorphone (PF) (DILAUDID) injection 0.5 mg (0.5 mg Intravenous $Given 7/13/23 2245)      Independent Interpretation (X-rays, CTs, rhythm strip):  None     Assessments/Consultations/Discussion of Management or Tests:   ED Course as of 07/13/23 2340   Thu Jul 13, 2023 2220 I obtained history and examined the patient as noted above.    2339 I spoke with Dr. Nash, hospitalist, who agreed to admit the patient.      Social Determinants of Health affecting care:   None    Disposition:  The patient was admitted to the hospital under the care of Dr. Nash.     Impression & Plan    CMS Diagnoses: None    Medical Decision Making:  Josselyn Brewster is a 42 year old male who presents with abdominal pain.  He is nontoxic on arrival. His workup was initiated from triage.  Labs with noted hyperglycemia though no DKA; chronic anemia near baseline. Mild transaminitis, near baseline. No profound electrolyte derangements. CT abdomen suggests pancreatitis with identified pseudocyst. He required multiple doses of analgesia during his time in the ED.   Strong suspicion likely triglyceride induced, triglyceride levels sent today.  He is to benefit from hospitalization with pain control and IVF at this time.     Diagnosis:    ICD-10-CM    1. Acute pancreatitis, unspecified complication status, unspecified pancreatitis type  K85.90       2. Hyperglycemia  R73.9       3. Pancreatic pseudocyst  K86.3          Scribe Disclosure:  I, Talita Jensen, am serving as a scribe at 11:08 PM on 7/13/2023 to document services personally performed by Jessa Barbour DO based on my observations and the provider's statements to me.     7/13/2023   Jessa Barbour DO McDonald, Lindsey E, DO  07/13/23 3537

## 2023-07-14 NOTE — ED TRIAGE NOTES
Patient was seen 2 weeks ago and diagnosed with pancreatis and found out he was Type 2 diabetic. Patient started having similar abdominal pain last night.      Triage Assessment     Row Name 07/13/23 7905       Triage Assessment (Adult)    Airway WDL WDL       Respiratory WDL    Respiratory WDL WDL       Skin Circulation/Temperature WDL    Skin Circulation/Temperature WDL WDL       Cardiac WDL    Cardiac WDL WDL       Peripheral/Neurovascular WDL    Peripheral Neurovascular WDL WDL       Cognitive/Neuro/Behavioral WDL    Cognitive/Neuro/Behavioral WDL WDL

## 2023-07-14 NOTE — PLAN OF CARE
[facility]    ED Boarding Nurse Handoff Addendum Report:    Date/time: 7/14/2023, 4:31 AM    Activity Level: standby    Fall Risk: No    Active Infusions: LR at 125 mL/hr     Current Meds Due: Pain medications q3h    Current care needs:     Oxygen requirements (liters/min and/or FiO2): RA    Respiratory status: Room air    Vital signs (within last 30 minutes):    Vitals:    07/13/23 1907 07/13/23 2346 07/14/23 0105   BP: (!) 132/100 (!) 152/99 (!) 151/102   BP Location:   Right arm   Patient Position:   Semi-Thomas's   Pulse: 90 65 64   Resp: 18  16   Temp: 98.1  F (36.7  C)  97.9  F (36.6  C)   TempSrc:   Oral   SpO2: 98% 98% 98%       Focused assessment within last 30 minutes:    Patient is alert and oriented x4, NPO status, would like prn medications q3h. Has a nutritional consult in the morning. Did not sleep very well at night.     ED Boarding Nurse name: Jesica Ibarra RN     RECEIVING UNIT ED HANDOFF REVIEW    Above ED Nurse Handoff Report was reviewed: Yes  Reviewed by: Yolanda Ball RN on July 14, 2023 at 5:24 PM   RECEIVING UNIT ED HANDOFF REVIEW    Above ED Nurse Handoff Report was reviewed: Yes  Reviewed by: Olimpia Juan RN on July 14, 2023 at 6:26 PM

## 2023-07-14 NOTE — H&P
"[facility] History and Physical    Josselyn Brewster MRN# [id number]   Age: 42 year old YOB: 1980     Date of Admission:  7/13/2023    Home clinic:  Clinic, [facility] [place]          Assessment and Plan:   Assessment:   Josselyn Brewster is a 42-year-old man who was recently diagnosed with insulin-dependent diabetes when he presented in late June with abdominal pain.  He was found to have DKA and acute pancreatitis due to hypertriglyceridemia.  He did well and was started on insulin.  He returns at this time due to recurrent pain associated with nausea.    In the emergency department, the patient is hemodynamically stable though significantly uncomfortable.  Creatinine 0.86, glucose 200, AST 56/ALT 75.  WBC 5.7, Hgb 12.7, .  Lipase 72.  CT abdomen pelvis shows \"mild acute interstitial edematous pancreatitis\".  In addition patient has a \"3.6 cm pseudocyst along the pancreatic head and duodenum\".    Dx:  1.  Abdominal pain due to persistent/recurrent acute pancreatitis.  Incidentally noted, the patient has a 3.6 cm pseudocyst along the pancreatic head and duodenum.  The first episode of acute pancreatitis was about 3 weeks ago at which time the etiology was thought to be hypertriglyceridemia which in turn was due to uncontrolled diabetes.  This episode is likely related to patient's intake of recipes from the \"keto diet\".  2.  Recently diagnosed IDDM, type II. Control is inadequate with values running in excess of 250 mg/dL.  3.  Suspect pancreatic exocrine insufficiency.  Patient describes steatorrhea.  4.  History of problematic narcotic use.  This was exacerbated by longstanding chronic back pain for which the patient is a multiple surgeries.  Unfortunately, he undoubtedly has some narcotic tolerance.  5.  Elevated blood pressure, probably due to inadequate pain control.  6.  Documentation of alcohol abuse is not historically accurate.  Mild transaminitis probably is due to " "fatty liver and perihepatic inflammation.      Plan:   1.  Admit inpatient.  2.  N.p.o. except small volumes of clear liquids.  3.  LR at 125 ml/hr.  4.  Continue with evening dose of Lantus (which is too low for him) and will cover with q 4 ISS. I ordered a Dexcom for him so that it can be placed while he's in the hospital.    5.  Pain control with IV hydromorphone to get initial control, then switch to q 4 h Oxycodone, 5-10 mg for moderate pain, 15 mg for severe pain.   6.  Could empirically start Pancreatic enzyme replacement when the pt starts eating. Consider re-consulting GI.   7.  Nutrition consult.              Chief Complaint:   Abdominal pain and nausea     History is obtained from the patient, EMR and ED physician.     Josselyn Brewster was admitted here from 6/28 through 7/3/2023 also for abdominal pain that turned out to be acute pancreatitis triggered by hypertriglyceridemia.  He was noted to be in DKA at the time and his triglycerides were greater than 3500.  He did well with conservative measures and was started on insulin.  He was seen by gastroenterology and it looks as though he is can follow-up with [facility] though I did not confirm that today.    Unfortunately Mr. Brewster tells me that one of the nurses had instructed him to look at recipes under \"keto diet\" during his last hospitalization.  Of course that would be good for diabetes but terrible for pancreatitis.    Patient indicates that he had onset of pain yesterday and that but rather dramatically worsened today when he ate.  He and his partner are here and they sound like they really need basic guidance for dietary choices which of course should be low-fat.    He also reports that his sugars have been running between 250 and 350.  I note that he was seen by his primary doctor, Dr. Pierre on 7/5/2023 at which time he was given referrals for diabetic educator, etc.  Does not look as though any adjustments were made in the " diabetic regimen.    At this time, the patient indicates he felt somewhat dizzy when he was in the emergency department waiting room.  He also has had some mild sweats but no measured fevers.  He has been nauseated but without vomiting.  Denies problems with shortness of breath and chest discomfort.  Reports that he has been drinking adequate amounts and urinating pretty normally.  He does unfortunately endorse some stearrhea.        Past Medical History:   Insulin-dependent diabetes type 2.  Diagnosed 6/2023.  Hypertriglyceridemia associated with DKA  Acute pancreatitis triggered by hypertriglyceridemia.  No evidence of necrosis or infection.  Notably the patient has developed a pseudocyst.  Chronic back pain s/p multiple surgeries  History of cocaine abuse  Spinal stenosis with neurogenic claudication         Past Surgical History:   Multiple lumbar surgeries  Right hernia repair         Social History:   Patient is a lifelong non-smoker.  He drinks rarely.         Family History:   Mother notably has a history of diabetes.  Other family medical history negative.           Allergies:     Allergies   Allergen Reactions     Levofloxacin Hives     Morphine Other (See Comments)     Other reaction(s): Urinary Retention  Other reaction(s): Urinary Retention             Medications:   Not yet reconciled by pharmacy  Lantus insulin 15 units in the evening  Aspart insulin sliding scale 1 unit for every 25 mg/dL greater than 140 at mealtimes at at bedtime  Gemfibrozil 600 mg twice daily  Citalopram 40 mg daily  Recently discontinued hydrochlorothiazide, labetalol         Review of Systems:   A comprehensive review of systems was performed and found to be negative except as described in this note           Physical Exam:   Vitals were reviewed  Temp: 98.1  F (36.7  C)   BP: (!) 152/99 Pulse: 65   Resp: 18 SpO2: 98 %      Constitutional: Awake, alert, cooperative, no apparent distress, and appears stated age.  Uncomfortable  due to abdominal  Eyes: Lids and lashes normal, pupils equal, round, extra ocular muscles intact, sclera clear, conjunctiva normal.  ENT: Normocephalic, without obvious abnormality, atraumatic.  Neck: Supple, symmetrical, trachea midline, no adenopathy, thyroid symmetric, not enlarged and no tenderness, skin normal.  Hematologic / Lymphatic: No cervical lymphadenopathy and no supraclavicular lymphadenopathy.  Lungs: No increased work of breathing, good air exchange, clear to auscultation bilaterally, no crackles or wheezing.  Cardiovascular: Regular rate and rhythm and no murmur noted.  Abdomen: Decreased bowel sounds, soft, non-distended, tender in the epigastric area.  Unable to palpate deeply enough for assessment of organomegaly or masses.  Musculoskeletal: No redness, warmth, or swelling of the joints.  Tone is normal.  Neurologic: Awake, alert, oriented to name, place and time.  Cranial nerves II-XII are grossly intact.    Neuropsychiatric: Normal affect, mood, orientation, memory and insight.  Skin: No rashes, erythema, pallor, petechia or purpura.          Data:     Results for orders placed or performed during the hospital encounter of 07/13/23 (from the past 24 hour(s))   Extra Tube ([place] Draw)    Narrative    The following orders were created for panel order Extra Tube ([place] Draw).  Procedure                               Abnormality         Status                     ---------                               -----------         ------                     Extra Blue Top Tube[[id number]]                              Final result               Extra Red Top Tube[[id number]]                               Final result               Extra Green Top (Lithium...[[id number]]                      Final result               Extra Purple Top Tube[[id number]]                            Final result                 Please view results for these tests on the individual orders.   CBC + differential    Narrative    The  following orders were created for panel order CBC + differential.  Procedure                               Abnormality         Status                     ---------                               -----------         ------                     CBC with platelets and d...[[id number]]  Abnormal            Final result                 Please view results for these tests on the individual orders.   Basic metabolic panel (BMP)   Result Value Ref Range    Sodium 137 136 - 145 mmol/L    Potassium 4.2 3.4 - 5.3 mmol/L    Chloride 102 98 - 107 mmol/L    Carbon Dioxide (CO2) 23 22 - 29 mmol/L    Anion Gap 12 7 - 15 mmol/L    Urea Nitrogen 18.0 6.0 - 20.0 mg/dL    Creatinine 0.86 0.67 - 1.17 mg/dL    Calcium 10.4 (H) 8.6 - 10.0 mg/dL    Glucose 201 (H) 70 - 99 mg/dL    GFR Estimate >90 >60 mL/min/1.73m2   Lipase   Result Value Ref Range    Lipase 72 (H) 13 - 60 U/L   Extra Blue Top Tube   Result Value Ref Range    Hold Specimen JIC    Extra Red Top Tube   Result Value Ref Range    Hold Specimen JIC    Extra Green Top (Lithium Heparin) Tube   Result Value Ref Range    Hold Specimen JIC    Extra Purple Top Tube   Result Value Ref Range    Hold Specimen JIC    CBC with platelets and differential   Result Value Ref Range    WBC Count 5.7 4.0 - 11.0 10e3/uL    RBC Count 4.86 4.40 - 5.90 10e6/uL    Hemoglobin 12.7 (L) 13.3 - 17.7 g/dL    Hematocrit 38.7 (L) 40.0 - 53.0 %    MCV 80 78 - 100 fL    MCH 26.1 (L) 26.5 - 33.0 pg    MCHC 32.8 31.5 - 36.5 g/dL    RDW 14.4 10.0 - 15.0 %    Platelet Count 553 (H) 150 - 450 10e3/uL    % Neutrophils 56 %    % Lymphocytes 35 %    % Monocytes 6 %    % Eosinophils 1 %    % Basophils 1 %    % Immature Granulocytes 1 %    NRBCs per 100 WBC 0 <1 /100    Absolute Neutrophils 3.2 1.6 - 8.3 10e3/uL    Absolute Lymphocytes 2.0 0.8 - 5.3 10e3/uL    Absolute Monocytes 0.3 0.0 - 1.3 10e3/uL    Absolute Eosinophils 0.1 0.0 - 0.7 10e3/uL    Absolute Basophils 0.1 0.0 - 0.2 10e3/uL    Absolute Immature  Granulocytes 0.1 <=0.4 10e3/uL    Absolute NRBCs 0.0 10e3/uL   Hepatic function panel   Result Value Ref Range    Protein Total 8.6 (H) 6.4 - 8.3 g/dL    Albumin 5.0 3.5 - 5.2 g/dL    Bilirubin Total 0.3 <=1.2 mg/dL    Alkaline Phosphatase 104 40 - 129 U/L    AST 56 (H) 0 - 45 U/L    ALT 75 (H) 0 - 70 U/L    Bilirubin Direct <0.20 0.00 - 0.30 mg/dL   Glucose by meter   Result Value Ref Range    GLUCOSE BY METER POCT 199 (H) 70 - 99 mg/dL   CT Abdomen Pelvis w Contrast    Narrative    EXAM: CT ABDOMEN PELVIS W CONTRAST  LOCATION: [facility]  DATE: 7/13/2023    INDICATION: Epigastric pain. Vomiting. History of pancreatitis.  COMPARISON: CT abdomen pelvis 06/28/2023.  TECHNIQUE: CT scan of the abdomen and pelvis was performed following injection of IV contrast. Multiplanar reformats were obtained. Dose reduction techniques were used.  CONTRAST: 99 mL Isovue 370    FINDINGS:   LOWER CHEST: Small calcified granuloma within the lingula. No pleural effusions.    HEPATOBILIARY: Diffuse hepatic steatosis. No calcified gallstones or biliary ductal dilation.    PANCREAS: Slight inflammation of the fat surrounding the pancreatic head/uncinate process. Mildly thick-walled 3.6 x 1.7 x 2.3 cm pseudocyst along the pancreatic head and duodenum (3/#104). No other peripancreatic fluid collections. No ductal dilation.    SPLEEN: Normal.    ADRENAL GLANDS: Normal.    KIDNEYS/BLADDER: Normal.    BOWEL: No obstruction or inflammation. Normal appendix. Moderate amount of stool throughout the colon.    LYMPH NODES: No enlarged lymph nodes.    VASCULATURE: Patent portal, splenic, and superior mesenteric veins. Mild atherosclerosis. No abdominal aortic aneurysm.    PELVIC ORGANS: Normal.    MUSCULOSKELETAL: Multilevel degenerative changes of the spine. No acute bony abnormality.      Impression    IMPRESSION:     1.  Mild acute interstitial edematous pancreatitis involving the pancreatic head/uncinate process.    2.   Mildly thick-walled 3.6 cm pseudocyst along the pancreatic head and duodenum related to the recent episode of pancreatitis. This could be sterile or infected.    3.  No other peripancreatic fluid collections.    4.  Diffuse hepatic steatosis.      All imaging studies reviewed by me.      Attestation:  I have reviewed today's vital signs, notes, medications, labs and imaging.     Rogelio Nash MD

## 2023-07-15 LAB
ANION GAP SERPL CALCULATED.3IONS-SCNC: 9 MMOL/L (ref 7–15)
BASOPHILS # BLD AUTO: 0 10E3/UL (ref 0–0.2)
BASOPHILS NFR BLD AUTO: 1 %
BUN SERPL-MCNC: 11.4 MG/DL (ref 6–20)
CALCIUM SERPL-MCNC: 9.2 MG/DL (ref 8.6–10)
CHLORIDE SERPL-SCNC: 102 MMOL/L (ref 98–107)
CREAT SERPL-MCNC: 0.73 MG/DL (ref 0.67–1.17)
DEPRECATED HCO3 PLAS-SCNC: 27 MMOL/L (ref 22–29)
EOSINOPHIL # BLD AUTO: 0.1 10E3/UL (ref 0–0.7)
EOSINOPHIL NFR BLD AUTO: 3 %
ERYTHROCYTE [DISTWIDTH] IN BLOOD BY AUTOMATED COUNT: 14.5 % (ref 10–15)
GFR SERPL CREATININE-BSD FRML MDRD: >90 ML/MIN/1.73M2
GLUCOSE BLDC GLUCOMTR-MCNC: 100 MG/DL (ref 70–99)
GLUCOSE BLDC GLUCOMTR-MCNC: 106 MG/DL (ref 70–99)
GLUCOSE BLDC GLUCOMTR-MCNC: 174 MG/DL (ref 70–99)
GLUCOSE BLDC GLUCOMTR-MCNC: 218 MG/DL (ref 70–99)
GLUCOSE BLDC GLUCOMTR-MCNC: 226 MG/DL (ref 70–99)
GLUCOSE BLDC GLUCOMTR-MCNC: 91 MG/DL (ref 70–99)
GLUCOSE BLDC GLUCOMTR-MCNC: 98 MG/DL (ref 70–99)
GLUCOSE SERPL-MCNC: 95 MG/DL (ref 70–99)
HCT VFR BLD AUTO: 31.4 % (ref 40–53)
HGB BLD-MCNC: 9.9 G/DL (ref 13.3–17.7)
IMM GRANULOCYTES # BLD: 0 10E3/UL
IMM GRANULOCYTES NFR BLD: 1 %
LYMPHOCYTES # BLD AUTO: 1.3 10E3/UL (ref 0.8–5.3)
LYMPHOCYTES NFR BLD AUTO: 29 %
MAGNESIUM SERPL-MCNC: 1.9 MG/DL (ref 1.7–2.3)
MCH RBC QN AUTO: 26 PG (ref 26.5–33)
MCHC RBC AUTO-ENTMCNC: 31.5 G/DL (ref 31.5–36.5)
MCV RBC AUTO: 82 FL (ref 78–100)
MONOCYTES # BLD AUTO: 0.3 10E3/UL (ref 0–1.3)
MONOCYTES NFR BLD AUTO: 6 %
NEUTROPHILS # BLD AUTO: 2.7 10E3/UL (ref 1.6–8.3)
NEUTROPHILS NFR BLD AUTO: 60 %
NRBC # BLD AUTO: 0 10E3/UL
NRBC BLD AUTO-RTO: 0 /100
PLATELET # BLD AUTO: 367 10E3/UL (ref 150–450)
POTASSIUM SERPL-SCNC: 3.9 MMOL/L (ref 3.4–5.3)
RBC # BLD AUTO: 3.81 10E6/UL (ref 4.4–5.9)
SODIUM SERPL-SCNC: 138 MMOL/L (ref 136–145)
WBC # BLD AUTO: 4.5 10E3/UL (ref 4–11)

## 2023-07-15 PROCEDURE — 99233 SBSQ HOSP IP/OBS HIGH 50: CPT | Performed by: HOSPITALIST

## 2023-07-15 PROCEDURE — 250N000011 HC RX IP 250 OP 636: Performed by: INTERNAL MEDICINE

## 2023-07-15 PROCEDURE — 83735 ASSAY OF MAGNESIUM: CPT | Performed by: HOSPITALIST

## 2023-07-15 PROCEDURE — 36415 COLL VENOUS BLD VENIPUNCTURE: CPT | Performed by: HOSPITALIST

## 2023-07-15 PROCEDURE — 250N000013 HC RX MED GY IP 250 OP 250 PS 637: Performed by: INTERNAL MEDICINE

## 2023-07-15 PROCEDURE — 258N000003 HC RX IP 258 OP 636: Performed by: HOSPITALIST

## 2023-07-15 PROCEDURE — 258N000003 HC RX IP 258 OP 636: Performed by: INTERNAL MEDICINE

## 2023-07-15 PROCEDURE — 250N000011 HC RX IP 250 OP 636: Performed by: HOSPITALIST

## 2023-07-15 PROCEDURE — 120N000001 HC R&B MED SURG/OB

## 2023-07-15 PROCEDURE — 250N000013 HC RX MED GY IP 250 OP 250 PS 637: Performed by: HOSPITALIST

## 2023-07-15 PROCEDURE — 82310 ASSAY OF CALCIUM: CPT | Performed by: HOSPITALIST

## 2023-07-15 PROCEDURE — 85025 COMPLETE CBC W/AUTO DIFF WBC: CPT | Performed by: HOSPITALIST

## 2023-07-15 RX ORDER — OXYCODONE HYDROCHLORIDE 5 MG/1
10 TABLET ORAL EVERY 4 HOURS PRN
Status: DISCONTINUED | OUTPATIENT
Start: 2023-07-15 | End: 2023-07-16 | Stop reason: HOSPADM

## 2023-07-15 RX ORDER — HYDROMORPHONE HYDROCHLORIDE 1 MG/ML
0.5 INJECTION, SOLUTION INTRAMUSCULAR; INTRAVENOUS; SUBCUTANEOUS EVERY 6 HOURS PRN
Status: DISCONTINUED | OUTPATIENT
Start: 2023-07-15 | End: 2023-07-15

## 2023-07-15 RX ORDER — MAGNESIUM OXIDE 400 MG/1
400 TABLET ORAL EVERY 4 HOURS
Status: COMPLETED | OUTPATIENT
Start: 2023-07-15 | End: 2023-07-15

## 2023-07-15 RX ADMIN — SODIUM CHLORIDE, POTASSIUM CHLORIDE, SODIUM LACTATE AND CALCIUM CHLORIDE: 600; 310; 30; 20 INJECTION, SOLUTION INTRAVENOUS at 23:59

## 2023-07-15 RX ADMIN — HYDROMORPHONE HYDROCHLORIDE 1 MG: 1 INJECTION, SOLUTION INTRAMUSCULAR; INTRAVENOUS; SUBCUTANEOUS at 08:09

## 2023-07-15 RX ADMIN — SODIUM CHLORIDE, POTASSIUM CHLORIDE, SODIUM LACTATE AND CALCIUM CHLORIDE: 600; 310; 30; 20 INJECTION, SOLUTION INTRAVENOUS at 11:32

## 2023-07-15 RX ADMIN — CITALOPRAM HYDROBROMIDE 40 MG: 20 TABLET ORAL at 07:36

## 2023-07-15 RX ADMIN — INSULIN ASPART 4 UNITS: 100 INJECTION, SOLUTION INTRAVENOUS; SUBCUTANEOUS at 17:31

## 2023-07-15 RX ADMIN — OXYCODONE HYDROCHLORIDE 10 MG: 5 TABLET ORAL at 23:41

## 2023-07-15 RX ADMIN — INSULIN GLARGINE 15 UNITS: 100 INJECTION, SOLUTION SUBCUTANEOUS at 23:43

## 2023-07-15 RX ADMIN — GEMFIBROZIL 600 MG: 600 TABLET ORAL at 17:27

## 2023-07-15 RX ADMIN — GEMFIBROZIL 600 MG: 600 TABLET ORAL at 07:36

## 2023-07-15 RX ADMIN — OXYCODONE HYDROCHLORIDE 10 MG: 5 TABLET ORAL at 09:46

## 2023-07-15 RX ADMIN — OXYCODONE HYDROCHLORIDE 10 MG: 5 TABLET ORAL at 15:17

## 2023-07-15 RX ADMIN — HYDROMORPHONE HYDROCHLORIDE 1 MG: 1 INJECTION, SOLUTION INTRAMUSCULAR; INTRAVENOUS; SUBCUTANEOUS at 03:08

## 2023-07-15 RX ADMIN — MAGNESIUM OXIDE TAB 400 MG (241.3 MG ELEMENTAL MG) 400 MG: 400 (241.3 MG) TAB at 17:27

## 2023-07-15 RX ADMIN — MAGNESIUM OXIDE TAB 400 MG (241.3 MG ELEMENTAL MG) 400 MG: 400 (241.3 MG) TAB at 15:17

## 2023-07-15 RX ADMIN — HYDROMORPHONE HYDROCHLORIDE 1 MG: 1 INJECTION, SOLUTION INTRAMUSCULAR; INTRAVENOUS; SUBCUTANEOUS at 11:33

## 2023-07-15 RX ADMIN — OXYCODONE HYDROCHLORIDE 10 MG: 5 TABLET ORAL at 20:15

## 2023-07-15 RX ADMIN — SODIUM CHLORIDE, POTASSIUM CHLORIDE, SODIUM LACTATE AND CALCIUM CHLORIDE: 600; 310; 30; 20 INJECTION, SOLUTION INTRAVENOUS at 03:00

## 2023-07-15 RX ADMIN — OXYCODONE HYDROCHLORIDE 10 MG: 5 TABLET ORAL at 05:17

## 2023-07-15 RX ADMIN — HYDROMORPHONE HYDROCHLORIDE 0.5 MG: 1 INJECTION, SOLUTION INTRAMUSCULAR; INTRAVENOUS; SUBCUTANEOUS at 01:02

## 2023-07-15 ASSESSMENT — ACTIVITIES OF DAILY LIVING (ADL)
ADLS_ACUITY_SCORE: 18
TOILETING_ISSUES: NO
ADLS_ACUITY_SCORE: 18
CHANGE_IN_FUNCTIONAL_STATUS_SINCE_ONSET_OF_CURRENT_ILLNESS/INJURY: NO
ADLS_ACUITY_SCORE: 35
WALKING_OR_CLIMBING_STAIRS_DIFFICULTY: NO
ADLS_ACUITY_SCORE: 35
DOING_ERRANDS_INDEPENDENTLY_DIFFICULTY: NO
ADLS_ACUITY_SCORE: 18
ADLS_ACUITY_SCORE: 35
CONCENTRATING,_REMEMBERING_OR_MAKING_DECISIONS_DIFFICULTY: NO
ADLS_ACUITY_SCORE: 18
ADLS_ACUITY_SCORE: 35
ADLS_ACUITY_SCORE: 18
WEAR_GLASSES_OR_BLIND: NO
ADLS_ACUITY_SCORE: 18
DIFFICULTY_EATING/SWALLOWING: NO
ADLS_ACUITY_SCORE: 35
DRESSING/BATHING_DIFFICULTY: NO
FALL_HISTORY_WITHIN_LAST_SIX_MONTHS: NO
ADLS_ACUITY_SCORE: 35

## 2023-07-15 NOTE — PROGRESS NOTES
[facility]    Medicine Progress Note - Hospitalist Service    Date of Admission:  7/13/2023    Assessment & Plan   Josselyn Brewster is a 42 year old male patient with past medical history of hypertension, hyperlipidemia, depression, spinal stenosis, history of cocaine dependence and polysubstance abuse, osteoarthritis, pancreatitis and DMT2, came to emergency room with complaint of abdominal pain.  Found to have recurrent/acute pancreatitis     Acute, recurrent pancreatitis  Hx hypertriglyceridemia  Medical non compliance  -recently admitted for same and thought to be due to elevated triglycerides, uncontrolled newly diagnosed DMT2 and alcohol to lesser degree. Had resolved and discharged home but started a high fat diet per he reports nurses suggestion.  -now presents with symptoms c/w recurrent pancreatitis although lipase only 72. CT with pancreatic stranding but could just be resolving from recent pancreatitis  -seen by GI, recommend continued conservative management  -advance diet to clears today, start limiting IV narcotics and focus on oral. Does have hx of polysubstance abuse so try to limit.  -decrease fluids     Anemia  -no active bleeding noted. Likely dilutional component  -serial labs    DMT2, insulin dependent  -cont recnetly prescribed glargine insulin 15 units daily and ISS     Hx Alcohol use   -no evidence of withdrawals currently, monitor     Diet: Clear Liquid Diet    DVT Prophylaxis: Pneumatic Compression Devices  Franz Catheter: Not present  Lines: None     Cardiac Monitoring: None  Code Status: Full Code      Disposition Plan   Await improvement, possible discharge tomorrow       Delgado Billingsley DO  Hospitalist Service  [facility]  Securely message with [employer] (more info)  Text page via [employer] Paging/Directory   ______________________________________________________________________    Interval History   No significant overnight events, interested in  advancing diet today. No BM but passing lots of gas. No nausea or vomiting but still with some mild/moderate abd pain.     Physical Exam   Vital Signs: Temp: 98.1  F (36.7  C) Temp src: Oral BP: (!) 151/94 Pulse: 69   Resp: 16 SpO2: 98 % O2 Device: None (Room air)    Weight: 0 lbs 0 oz    Constitutional: drowsy, no distress  Eyes: pupils equal, round and reactive to light and conjunctiva normal  ENT: normocepalic, without obvious abnormality, atramatic  Respiratory: no increased work of breathing, good air exchange and clear to auscultation  Cardiovascular: regular rate and rhythm and no murmur noted  GI: normal bowel sounds, soft, non-distended and tenderness to palpation of epigastrium  Skin: no bruising or bleeding  Neurologic: drowsy but awake and interacting, no focal deficits    50 MINUTES SPENT BY ME on the date of service doing chart review, history, exam, documentation & further activities per the note.      Data   ------------------------- PAST 24 HR DATA REVIEWED -----------------------------------------------    I have personally reviewed the following data over the past 24 hrs:    4.5  \   9.9 (L)   / 367     138 102 11.4 /  106 (H)   3.9 27 0.73 \       Imaging results reviewed over the past 24 hrs:   No results found for this or any previous visit (from the past 24 hour(s)).

## 2023-07-15 NOTE — PLAN OF CARE
[id number]    Inpatient Progress Note:  A & O X 4. Lung sound clear bilaterally to auscultation. No crackles, or wheezing noted. Denies shortness of breath, nausea, vomit, or diarrhea. Bowel sound present all quads.  Admitting DX: Pancreatic pseudocyst. NPO status except for ice chips and medication . Peripheral IV infusing LR at 125 mL. Independent in the room . Patient on blood glucose monitoring, scheduled and sliding insulin for DM 2 management. Pain is managed with Dilaudid, and Oxycodone. C/O pain rating pain at 8-9 out of 10. Prn Dilaudid given X 3, Prn Oxycodone given X 1. Patient reported relief after prn pain medications administered.  Afebrile. GI following.  /79 (BP Location: Left arm)   Pulse 61   Temp 98  F (36.7  C) (Oral)   Resp 16   SpO2 94%    Will continue to provide supportive cares.   Amrit Miles RN

## 2023-07-15 NOTE — PROGRESS NOTES
GASTROENTEROLOGY PROGRESS NOTE    IMPRESSION: 43 y/o male with recent admission for new diagnosis of DM, DKA, and acute pancreatitis suspected to be due to hypertriglyceridemia, who presented with recurrent pain, found to have mild elevation of lipase (72, ULN is 60), persistent though stable changes of pancreatitis on cross sectional imaging, and new peripancreatic fluid collection.     Although he is on a clear liquid diet, I caught patient eating part of his partner's sandwich when I walked in the room. He states that he is no longer having pain. Will therefore stop IV pain meds and start advancing diet.     RECOMMENDATIONS:  -- Stop IV Pain meds  -- Advance to full liquid diet  -- Continue gemfibrozil  -- No intervention at this time for the pancreatic fluid collection  -- Would recommend outpatient follow up in our pancreas clinic    We will continue to follow with you.    -------------------------------------------------------------------------------------------------------    SUBJECTIVE: Patient was eating his partner's sandwich when I walked in the room. States he is not having any pain. Last got IV dilaudid this AM at 0800.    OBJECTIVE:  General Appearance:   BP (!) 151/94 (BP Location: Left arm)   Pulse 69   Temp 98.1  F (36.7  C) (Oral)   Resp 16   Ht 1.829 m (6')   Wt 92.3 kg (203 lb 6.4 oz)   SpO2 98%   BMI 27.59 kg/m    Temp (24hrs), Av  F (36.7  C), Min:97.9  F (36.6  C), Max:98.1  F (36.7  C)    Patient Vitals for the past 72 hrs:   Weight   07/15/23 1300 92.3 kg (203 lb 6.4 oz)       Intake/Output Summary (Last 24 hours) at 7/15/2023 1318  Last data filed at 7/15/2023 0300  Gross per 24 hour   Intake 2937 ml   Output --   Net 2937 ml       General: awake, alert, well appearing  Chest: no accessory muscle use  Abdomen: soft, mildly TTP epigastrium         LABS:  I have reviewed the patient's new clinical lab results:    Recent Labs   Lab Test 07/15/23  0556 23  1914 23  0634    WBC 4.5 5.7 5.7   HGB 9.9* 12.7* 11.2*   MCV 82 80 82    553* 208     Recent Labs   Lab Test 07/15/23  0556 07/14/23 0754 07/13/23 1914   POTASSIUM 3.9 4.0 4.2   CHLORIDE 102 104 102   CO2 27 24 23   BUN 11.4 19.8 18.0   ANIONGAP 9 10 12     Recent Labs   Lab Test 07/14/23  0447 07/13/23 1914 07/02/23  0700 07/01/23  0634 06/30/23  0546 06/29/23  1643 06/28/23  0757 06/28/23  0234   ALBUMIN  --  5.0 3.5 3.9 3.7  --   --   --    BILITOTAL  --  0.3 0.7 0.9 0.6  --   --   --    ALT  --  75* 52 40 32  --   --   --    AST  --  56* 52* 46* 38  --   --    < >   PROTEIN 20*  --   --   --   --  Negative  --   --    LIPASE  --  72*  --   --  56  --  176*  --     < > = values in this interval not displayed.       IMAGING:  No new applicable imaging    Lisa Andersen MD  [facility]  After 5pm: [phone]

## 2023-07-15 NOTE — PLAN OF CARE
Care from [id number]    Inpatient Progress Note:  For complete assessment see flow sheet documentation.    BP (!) 151/94 (BP Location: Left arm)   Pulse 69   Temp 98.1  F (36.7  C) (Oral)   Resp 16   Ht 1.829 m (6')   Wt 92.3 kg (203 lb 6.4 oz)   SpO2 98%   BMI 27.59 kg/m            Orientation: WDL  Pain status: Pt requesting pain meds regularly.  Rating pain at 5/10 in abdomen.  See MAR  Activity: Independent in room, ambulated in vernon  Resp: WDL  Cardiac: WDL  GI: tolerated clear diet well - no increase or pain or discomfort, ate some of GF's sandwich, denies nausea  : WDL  Skin: WDL  Infusions: LR @ 75  Diet: Full liquid, advance as tolerated  Consults: GI  Discharge plan: Home after diet advanced and tolerated        Will Continue to provide cares.    Olimpia Juan, RNGoal Outcome Evaluation:

## 2023-07-15 NOTE — CONSULTS
"CLINICAL NUTRITION SERVICES - EDUCATION NOTE    REASON FOR ASSESSMENT  Josselyn Brewster is a/an 42 year old male assessed by the dietitian for Provider Order - Nutrition Education - \"Patient with diabetes and recurrent acute pancreatitis.  Needs guidance on low-fat options for diet\"    NUTRITION HISTORY  Patient was recently diagnosed with type 2 DM, but did not receive diet education at that time. They had difficulty finding recipes for diabetes and someone suggested a keto diet. Patient was eating as little as 5 g of CHO per meal and was consistently hungry. He has also had a recurrence of his pancreatitis. He does have an appointment set up with a [facility].    CURRENT NUTRITION ORDERS  Diet: Full Liquid  Intake/Tolerance: Pt was NPO until today so intake has been poor, but he was eating at time of visit and reported hunger    NUTRITION DIAGNOSIS  Food- and nutrition-related knowledge deficit related to low fat and consistent CHO diet as evidenced by report of confusion of what he should be eating, report of no previous diet education      INTERVENTIONS  Implementation  Nutrition education for nutrition relationship to health/disease - provided low fat diet education related to current pancreatitis episode as well as consistent CHO diet education. Advised patient to follow up with Provider for when he may be able to start increasing fats in his diet and to follow up with [facility] for additional support regarding DM. Discussed that dietary recommendations may change as he recovers from pancreatitis and BGs become more manageable as he progresses further in treatment.    No nutrition follow-up warranted at this time.  Please consult if further needs arise.    Prisca Strauss, KAYLEEN, LD, [facility]  Pager - 3rd floor/ICU: [phone]  Pager - All other floors: [phone]  Pager - Weekend/holiday: [phone]  Office: [phone]    "

## 2023-07-16 VITALS
RESPIRATION RATE: 18 BRPM | WEIGHT: 203.4 LBS | OXYGEN SATURATION: 95 % | HEART RATE: 66 BPM | DIASTOLIC BLOOD PRESSURE: 91 MMHG | SYSTOLIC BLOOD PRESSURE: 146 MMHG | TEMPERATURE: 98.2 F | HEIGHT: 72 IN | BODY MASS INDEX: 27.55 KG/M2

## 2023-07-16 LAB
GLUCOSE BLDC GLUCOMTR-MCNC: 106 MG/DL (ref 70–99)
GLUCOSE BLDC GLUCOMTR-MCNC: 112 MG/DL (ref 70–99)
GLUCOSE BLDC GLUCOMTR-MCNC: 230 MG/DL (ref 70–99)
MAGNESIUM SERPL-MCNC: 1.9 MG/DL (ref 1.7–2.3)
POTASSIUM SERPL-SCNC: 4.1 MMOL/L (ref 3.4–5.3)

## 2023-07-16 PROCEDURE — 83735 ASSAY OF MAGNESIUM: CPT | Performed by: HOSPITALIST

## 2023-07-16 PROCEDURE — 99239 HOSP IP/OBS DSCHRG MGMT >30: CPT | Performed by: HOSPITALIST

## 2023-07-16 PROCEDURE — 36415 COLL VENOUS BLD VENIPUNCTURE: CPT | Performed by: HOSPITALIST

## 2023-07-16 PROCEDURE — 250N000013 HC RX MED GY IP 250 OP 250 PS 637: Performed by: INTERNAL MEDICINE

## 2023-07-16 PROCEDURE — 250N000013 HC RX MED GY IP 250 OP 250 PS 637: Performed by: HOSPITALIST

## 2023-07-16 PROCEDURE — 84132 ASSAY OF SERUM POTASSIUM: CPT | Performed by: HOSPITALIST

## 2023-07-16 PROCEDURE — 250N000011 HC RX IP 250 OP 636: Performed by: INTERNAL MEDICINE

## 2023-07-16 RX ORDER — MAGNESIUM OXIDE 400 MG/1
400 TABLET ORAL EVERY 4 HOURS
Status: COMPLETED | OUTPATIENT
Start: 2023-07-16 | End: 2023-07-16

## 2023-07-16 RX ORDER — OXYCODONE HYDROCHLORIDE 5 MG/1
15 TABLET ORAL EVERY 4 HOURS PRN
Status: DISCONTINUED | OUTPATIENT
Start: 2023-07-16 | End: 2023-07-16 | Stop reason: HOSPADM

## 2023-07-16 RX ORDER — BLOOD PRESSURE TEST KIT
KIT MISCELLANEOUS
Qty: 100 EACH | Refills: 0 | Status: SHIPPED | OUTPATIENT
Start: 2023-07-16

## 2023-07-16 RX ORDER — OXYCODONE HYDROCHLORIDE 10 MG/1
10 TABLET ORAL EVERY 4 HOURS PRN
Qty: 8 TABLET | Refills: 0 | Status: SHIPPED | OUTPATIENT
Start: 2023-07-16 | End: 2023-12-23

## 2023-07-16 RX ORDER — HYDROMORPHONE HYDROCHLORIDE 1 MG/ML
0.5 INJECTION, SOLUTION INTRAMUSCULAR; INTRAVENOUS; SUBCUTANEOUS ONCE
Status: COMPLETED | OUTPATIENT
Start: 2023-07-16 | End: 2023-07-16

## 2023-07-16 RX ADMIN — MAGNESIUM OXIDE TAB 400 MG (241.3 MG ELEMENTAL MG) 400 MG: 400 (241.3 MG) TAB at 10:55

## 2023-07-16 RX ADMIN — CITALOPRAM HYDROBROMIDE 40 MG: 20 TABLET ORAL at 08:59

## 2023-07-16 RX ADMIN — INSULIN ASPART 4 UNITS: 100 INJECTION, SOLUTION INTRAVENOUS; SUBCUTANEOUS at 12:24

## 2023-07-16 RX ADMIN — GEMFIBROZIL 600 MG: 600 TABLET ORAL at 08:59

## 2023-07-16 RX ADMIN — OXYCODONE HYDROCHLORIDE 15 MG: 5 TABLET ORAL at 04:01

## 2023-07-16 RX ADMIN — HYDROMORPHONE HYDROCHLORIDE 0.5 MG: 1 INJECTION, SOLUTION INTRAMUSCULAR; INTRAVENOUS; SUBCUTANEOUS at 02:52

## 2023-07-16 RX ADMIN — MAGNESIUM OXIDE TAB 400 MG (241.3 MG ELEMENTAL MG) 400 MG: 400 (241.3 MG) TAB at 08:59

## 2023-07-16 ASSESSMENT — ACTIVITIES OF DAILY LIVING (ADL)
ADLS_ACUITY_SCORE: 18

## 2023-07-16 NOTE — PROGRESS NOTES
GASTROENTEROLOGY PROGRESS NOTE    IMPRESSION: 41 y/o male with recent admission for new diagnosis of DM, DKA, and acute pancreatitis suspected to be due to hypertriglyceridemia, who presented with recurrent pain, found to have mild elevation of lipase (72, ULN is 60), persistent though stable changes of pancreatitis on cross sectional imaging, and new peripancreatic fluid collection.      He did have increased pain last night, though this now seems controlled with PO oxycodone. He is tolerating a full liquid diet, but unclear if he has been compliant, as I did catch him eating a sandwich brought it from outside yesterday.     RECOMMENDATIONS:  -- PO pain meds  -- Continue full liquid diet - would not advance at this time due to increased pain last night  -- Continue gemfibrozil  -- No intervention at this time for the pancreatic fluid collection  -- Would recommend outpatient follow up in our pancreas clinic    We will continue to follow with you.  -------------------------------------------------------------------------------------------------------    SUBJECTIVE: Had increased pain last night. Didn't feel like it was any worse with full liquid diet. No nausea / vomiting.     OBJECTIVE:  General Appearance:    BP (!) 156/102 (BP Location: Left arm)   Pulse 78   Temp 98.1  F (36.7  C) (Oral)   Resp 16   Ht 1.829 m (6')   Wt 92.3 kg (203 lb 6.4 oz)   SpO2 97%   BMI 27.59 kg/m    Temp (24hrs), Av.9  F (36.6  C), Min:97.5  F (36.4  C), Max:98.1  F (36.7  C)    Patient Vitals for the past 72 hrs:   Weight   07/15/23 1300 92.3 kg (203 lb 6.4 oz)     No intake or output data in the 24 hours ending 23 0717    General: awake, alert, well appearing  Abdomen: soft, tender to light palpation epigastrium     LABS:  I have reviewed the patient's new clinical lab results:    Recent Labs   Lab Test 07/15/23  0556 23  1914 23  0634   WBC 4.5 5.7 5.7   HGB 9.9* 12.7* 11.2*   MCV 82 80 82    553*  208     Recent Labs   Lab Test 07/15/23  0556 07/14/23  0754 07/13/23 1914   POTASSIUM 3.9 4.0 4.2   CHLORIDE 102 104 102   CO2 27 24 23   BUN 11.4 19.8 18.0   ANIONGAP 9 10 12     Recent Labs   Lab Test 07/14/23  0447 07/13/23  1914 07/02/23  0700 07/01/23  0634 06/30/23  0546 06/29/23  1643 06/28/23  0757 06/28/23  0234   ALBUMIN  --  5.0 3.5 3.9 3.7  --   --   --    BILITOTAL  --  0.3 0.7 0.9 0.6  --   --   --    ALT  --  75* 52 40 32  --   --   --    AST  --  56* 52* 46* 38  --   --    < >   PROTEIN 20*  --   --   --   --  Negative  --   --    LIPASE  --  72*  --   --  56  --  176*  --     < > = values in this interval not displayed.       IMAGING:  No new applicable imaging    Lisa Andersen MD  [facility]  After 5pm: [phone]

## 2023-07-16 NOTE — DISCHARGE SUMMARY
[facility]  Hospitalist Discharge Summary      Date of Admission:  7/13/2023  Date of Discharge:  7/16/2023  1:06 PM  Discharging Provider: Delgado Billingsley DO  Discharge Service: Hospitalist Service    Discharge Diagnoses   Possible acute, recurrent pancreatitis vs. Resolving  Pancreatic pseudocyst  Hx triglyceridemia  Medical non compliance  Anemia  DMT2, insulin dependent  Alcohol abuse    Follow-ups Needed After Discharge   Follow-up Appointments     Follow-up and recommended labs and tests       Follow up with primary care provider, [facility],   within 7 days for hospital follow- up.  No follow up labs or test are   needed.    Follow up with GI in 4-6 weeks or as directed         Discharge Disposition   Discharged to home  Condition at discharge: Stable    Hospital Course   Josselyn Brewster is a 42 year old male patient with past medical history of hypertension, hyperlipidemia, depression, spinal stenosis, history of cocaine dependence and polysubstance abuse, osteoarthritis, pancreatitis and DMT2, came to emergency room with complaint of abdominal pain.  Found to have recurrent/acute pancreatitis.    He was treated conservatively, lipase and triglycerides weren't impressive leading to question if this was a recurrence or more of a resolving of previous pancreatitis. However he did have epigastric pain and CT showed inflammation although could have been resolving from previous. Improved with conservative care, was non compliant with diet complicating his care. However day of dishcarge was tolerating diet. Seen by GI, they will follow as outpt as he does have pseudocyst.     Acute, recurrent pancreatitis  Pancreatic pseudocyst  Hx hypertriglyceridemia  Medical non compliance  -recently admitted for same and thought to be due to elevated triglycerides, uncontrolled newly diagnosed DMT2 and alcohol to lesser degree. Had resolved and discharged home but started a high fat  diet per he reports nurses suggestion.  -now presents with symptoms c/w recurrent pancreatitis although lipase only 72. CT with pancreatic stranding but could just be resolving from recent pancreatitis  -seen by GI, recommend continued conservative management with monitoring of his pseudocyst  -low fat/diabetic diet, small meals on discharge  -small amount of narcotics given  -cont previously prescribed gemfibrozil     Anemia  -no active bleeding noted. Likely dilutional component  -serial labs    DMT2, insulin dependent  -cont recnetly prescribed glargine insulin 15 units daily and ISS     Hx Alcohol use   -no evidence of withdrawals currently, monitor    Consultations This Hospital Stay   NUTRITION SERVICES ADULT IP CONSULT  GASTROENTEROLOGY IP CONSULT    Code Status   Prior    Time Spent on this Encounter   I, Delgado Billingsley DO, personally saw the patient today and spent greater than 30 minutes discharging this patient.       Delgado Billingsley DO  [facility] OBSERVATION DEPT  [address] [place] [address]  Phone: [phone]  ______________________________________________________________________    Physical Exam   Vital Signs: Temp: 98.2  F (36.8  C) Temp src: Oral BP: (!) 146/91 Pulse: 66   Resp: 18 SpO2: 95 % O2 Device: None (Room air)    Weight: 203 lbs 6.4 oz  Face to face completed day of discharge       [facility] Phys[facility]    Discharge Orders      Reason for your hospital stay    Admitted with possible pancreatitis, recommend low fat diet on discharge. If pain recurrs can switch to clear/full liquids at home as well. Cont gemfibrozil that was previously prescribed. Follow up with GI in 4-6 weeks or as directed for pancreatic pseudocyst.     Follow-up and recommended labs and tests     Follow up with primary care provider, [facility], within 7 days for hospital follow- up.  No follow up labs or test are needed.     Follow up with GI in 4-6 weeks or as directed     Activity    Your activity upon discharge: activity as tolerated     Diet    Follow this diet upon discharge: Orders Placed This Encounter      Low Fat and diabetic Diet       Significant Results and Procedures   Most Recent 3 CBC's:Recent Labs   Lab Test 07/15/23  0556 07/13/23 1914 07/01/23 0634   WBC 4.5 5.7 5.7   HGB 9.9* 12.7* 11.2*   MCV 82 80 82    553* 208     Most Recent 3 BMP's:Recent Labs   Lab Test 07/16/23  1152 07/16/23  0731 07/16/23  0556 07/16/23  0236 07/15/23  0628 07/15/23  0556 07/14/23  1018 07/14/23 0754 07/13/23 1920 07/13/23 1914   NA  --   --   --   --   --  138  --  138  --  137   POTASSIUM  --   --  4.1  --   --  3.9  --  4.0  --  4.2   CHLORIDE  --   --   --   --   --  102  --  104  --  102   CO2  --   --   --   --   --  27  --  24  --  23   BUN  --   --   --   --   --  11.4  --  19.8  --  18.0   CR  --   --   --   --   --  0.73  --  0.79  --  0.86   ANIONGAP  --   --   --   --   --  9  --  10  --  12   DEYA  --   --   --   --   --  9.2  --  8.9  --  10.4*   * 106*  --  112*   < > 95   < > 135*   < > 201*    < > = values in this interval not displayed.     Most Recent 2 LFT's:Recent Labs   Lab Test 07/13/23 1914 07/02/23  0700   AST 56* 52*   ALT 75* 52   ALKPHOS 104 80   BILITOTAL 0.3 0.7     Most Recent 3 Creatinines:Recent Labs   Lab Test 07/15/23  0556 07/14/23 0754 07/13/23 1914   CR 0.73 0.79 0.86     Most Recent 3 Hemoglobins:Recent Labs   Lab Test 07/15/23  0556 07/13/23  1914 07/01/23  0634   HGB 9.9* 12.7* 11.2*     Most Recent 3 Troponin's:Recent Labs   Lab Test 06/29/21  0736 06/29/21  0357   TROPI <0.015 <0.015     Most Recent 3 BNP's:No lab results found.,   Results for orders placed or performed during the hospital encounter of 07/13/23   CT Abdomen Pelvis w Contrast    Narrative    EXAM: CT ABDOMEN PELVIS W CONTRAST  LOCATION: [facility]  DATE: 7/13/2023    INDICATION:  Epigastric pain. Vomiting. History of pancreatitis.  COMPARISON: CT abdomen pelvis 06/28/2023.  TECHNIQUE: CT scan of the abdomen and pelvis was performed following injection of IV contrast. Multiplanar reformats were obtained. Dose reduction techniques were used.  CONTRAST: 99 mL Isovue 370    FINDINGS:   LOWER CHEST: Small calcified granuloma within the lingula. No pleural effusions.    HEPATOBILIARY: Diffuse hepatic steatosis. No calcified gallstones or biliary ductal dilation.    PANCREAS: Slight inflammation of the fat surrounding the pancreatic head/uncinate process. Mildly thick-walled 3.6 x 1.7 x 2.3 cm pseudocyst along the pancreatic head and duodenum (3/#104). No other peripancreatic fluid collections. No ductal dilation.    SPLEEN: Normal.    ADRENAL GLANDS: Normal.    KIDNEYS/BLADDER: Normal.    BOWEL: No obstruction or inflammation. Normal appendix. Moderate amount of stool throughout the colon.    LYMPH NODES: No enlarged lymph nodes.    VASCULATURE: Patent portal, splenic, and superior mesenteric veins. Mild atherosclerosis. No abdominal aortic aneurysm.    PELVIC ORGANS: Normal.    MUSCULOSKELETAL: Multilevel degenerative changes of the spine. No acute bony abnormality.      Impression    IMPRESSION:     1.  Mild acute interstitial edematous pancreatitis involving the pancreatic head/uncinate process.    2.  Mildly thick-walled 3.6 cm pseudocyst along the pancreatic head and duodenum related to the recent episode of pancreatitis. This could be sterile or infected.    3.  No other peripancreatic fluid collections.    4.  Diffuse hepatic steatosis.       Discharge Medications   Discharge Medication List as of 7/16/2023 11:38 AM      START taking these medications    Details   Continuous Blood Gluc  (DEXCOM G6 ) BRENDA Use to read blood sugars as per 's instructions., Disp-1 each, R-0, Local Print      Continuous Blood Gluc Sensor (DEXCOM G6 SENSOR) MISC Change every 10 days.,  Disp-3 each, R-5, Local Print      Continuous Blood Gluc Transmit (DEXCOM G6 TRANSMITTER) MISC Change every 3 months., Disp-1 each, R-1, Local Print      oxyCODONE (ROXICODONE) 10 MG tablet Take 1 tablet (10 mg) by mouth every 4 hours as needed for moderate pain, Disp-8 tablet, R-0, Local Print         CONTINUE these medications which have NOT CHANGED    Details   acetaminophen (TYLENOL) 500 MG tablet Take 500-1,000 mg by mouth every 6 hours as needed for mild pain, Historical      citalopram (CELEXA) 40 MG tablet Take 40 mg by mouth daily, Historical      folic acid (FOLVITE) 1 MG tablet Take 1 tablet (1 mg) by mouth daily for 30 days, Disp-30 tablet, R-0, E-Prescribe      gemfibrozil (LOPID) 600 MG tablet Take 1 tablet (600 mg) by mouth 2 times daily (before meals) for 60 days, Disp-120 tablet, R-0, E-Prescribe      ibuprofen (ADVIL/MOTRIN) 200 MG tablet Take 200-600 mg by mouth every 4 hours as needed for pain, Historical      insulin aspart (NOVOLOG PEN) 100 UNIT/ML pen Inject 1-7 Units Subcutaneous 3 times daily (before meals) Blood Sugar Low Dose: 60 - 110  No insulin; 111 - 150 2 units; 151 - 200 4 units; 201 - 250 6 units; 251 - 300 8 units; 301 - 350 10 units; >350 12 units (call physician), Disp-15 mL, R-3, E-Pres cribe      insulin glargine (LANTUS PEN) 100 UNIT/ML pen Inject 15 Units Subcutaneous At Bedtime, Disp-15 mL, R-3, E-PrescribeIf Lantus is not covered by insurance, may substitute Basaglar or Semglee or other insulin glargine product per insurance preference at same dose and frequency.        multivitamin, therapeutic (THERA-VIT) TABS tablet Take 1 tablet by mouth daily for 30 days, Disp-30 tablet, R-0, E-Prescribe      thiamine (B-1) 100 MG tablet Take 1 tablet (100 mg) by mouth daily for 30 days, Disp-30 tablet, R-0, E-Prescribe      Alcohol Swabs PADS Use to swab the area of the injection or oliver as directed Per insurance coverage, Disp-100 each, R-0, E-Prescribe      blood glucose (NO BRAND  SPECIFIED) lancets standard To use to test glucose level in the blood Use to test blood sugar  4  times daily as directed. To accompany glucose monitor brands per insurance coverage.Disp-100 each, [id number]      blood glucose (NO BRAND SPECIFIED) test strip To use to test glucose level in the blood Use to test blood sugar  4 times daily as directed. To accompany glucose monitor brands per insurance coverage., Disp-100 strip, R-0, E-Prescribe      blood glucose calibration (NO BRAND SPECIFIED) solution Used to calibrate the blood glucose monitor as needed and as directed.  To accompany  blood glucose brands per insurance coverage, Disp-1 each, R-0, E-Prescribe      blood glucose monitoring (NO BRAND SPECIFIED) meter device kit Use as directed Per insurance coverageDisp-1 kit, [id number]      Sharps Container MISC Use as directed to dispose of needles, lancets and other sharps, Disp-1 each, R-0, E-Prescribe           Allergies   Allergies   Allergen Reactions     Levofloxacin Hives     Morphine Other (See Comments)     Other reaction(s): Urinary Retention  Other reaction(s): Urinary Retention

## 2023-07-16 NOTE — PROGRESS NOTES
Patient c/o pain stated prn oxycodone 10mg q4hrs not helping current pain, observed pt changing positing frequently due to pain. X- cover provider paged. Prn oxycodone dose increased to 15mg.

## 2023-07-16 NOTE — PLAN OF CARE
Patient's After Visit Summary was reviewed with patient and/or significant other.   Patient verbalized understanding of After Visit Summary, recommended follow up and was given an opportunity to ask questions.   Discharge medications sent home with patient/family: YES   Discharged with significant other    Goal Outcome Evaluation:      Plan of Care Reviewed With: patient    Overall Patient Progress: improvingOverall Patient Progress: improving

## 2023-07-16 NOTE — PLAN OF CARE
[id number]    Inpatient Progress Note:    BP (!) 156/102 (BP Location: Left arm)   Pulse 78   Temp 98.1  F (36.7  C) (Oral)   Resp 16   Ht 1.829 m (6')   Wt 92.3 kg (203 lb 6.4 oz)   SpO2 97%   BMI 27.59 kg/m           Patient is Alert and Oriented x4. independent with no assistive devices .Pt is a Full liquid diet. complaining of 7/10 pain, Dilaudid and Oxycodone given for pain.  Patient has Lactated Ringer's running at 75 mL per hour.    Will continue to monitor and provide cares.     Isiah Palacios RN      Goal Outcome Evaluation:

## 2023-07-16 NOTE — PLAN OF CARE
Care from [id number]    Inpatient Progress Note:  For complete assessment see flow sheet documentation.    BP (!) 147/85 (BP Location: Right arm)   Pulse 62   Temp 98  F (36.7  C) (Oral)   Resp 18   Ht 1.829 m (6')   Wt 92.3 kg (203 lb 6.4 oz)   SpO2 95%   BMI 27.59 kg/m            Orientation: WDL  Pain status: 6/10  Activity: Independent in room  Resp: WDL  Cardiac: WDL  GI: Full liquid diet, reports abdominal pain that feels like squeezing  : WDL  Skin: WDL  Infusions: Fluids discontinued  Pertinent labs: Mag and Potassium protocol; Mag replaced  Diet: Full liquid  Consults: GI  Discharge plan: discharge home     Pt states he did not sleep at all last night.  Says he usually sleeps about 4-6 hrs/night.  Independent in room.      Will Continue to provide cares.    Olimpia Juan RNGoal Outcome Evaluation:      Plan of Care Reviewed With: patient

## 2023-07-18 ENCOUNTER — PATIENT OUTREACH (OUTPATIENT)
Dept: CARE COORDINATION | Facility: CLINIC | Age: 43
End: 2023-07-18
Payer: COMMERCIAL

## 2023-07-18 NOTE — PROGRESS NOTES
Clinic Care Coordination Contact  [facility]/Voicemail       Clinical Data: Care Coordinator Outreach  Outreach attempted x 2.  Left message on patient's voicemail with call back information and requested return call.  . Care Coordinator will do no further outreaches at this time.    Caridad Lassiter  [phone]  Care

## 2023-07-26 ENCOUNTER — NURSE TRIAGE (OUTPATIENT)
Dept: NURSING | Facility: CLINIC | Age: 43
End: 2023-07-26
Payer: COMMERCIAL

## 2023-07-26 NOTE — TELEPHONE ENCOUNTER
"  Caller states he is a new diabetic II; on insulin and sliding scale; has had 2 hospitalizations in past; has \"pancreas pain \"  at 6/10 and feels shakey.   States he has pancreatitis  but pain was much less since last hosptialization   Reporting preprandial  bG at 419; previous bG ;  has taken 12 units Novolog within past  30 minutes.  His 1230 preprandial  bG  @ 219 and took 6 unit(s) Novolog   Ate ramen noodles for lunch for 82 gm carbs   HS Lantus is 15 U   No vomiting, or other symptoms   BG 30 minutes after 12 unit(s) Novolog is 329      Caller has not  followed up with his PCP since hospitalization.   Does have endocrinology appointment   scheduled   Triage protocol reviewed   Advised to contact on call provider for his  [facility] PCP now; given  contact #   Advised to go to ED if he starts vomiting or pain increases.   Caller understands and will comply   Nevin Oconnor RN  FNA       Reason for Disposition   Blood glucose > 400 mg/dL (22.2 mmol/L)    Additional Information   Negative: Unconscious or difficult to awaken   Negative: Acting confused (e.g., disoriented, slurred speech)   Negative: Very weak (e.g., can't stand)   Negative: Sounds like a life-threatening emergency to the triager   Negative: [1] Vomiting AND [2] signs of dehydration (e.g., very dry mouth, lightheaded, dark urine)   Negative: [1] Blood glucose > 240 mg/dL (13.3 mmol/L) AND [2] rapid breathing   Negative: Blood glucose > 500 mg/dL (27.8 mmol/L)   Negative: [1] Blood glucose > 240 mg/dL (13.3 mmol/L) AND [2] urine ketones moderate-large (or more than 1+)   Negative: [1] Blood glucose > 240 mg/dL (13.3 mmol/L) AND [2] blood ketones > 1.4 mmol/L   Negative: [1] Blood glucose > 240 mg/dL (13.3 mmol/L) AND [2] vomiting AND [3] unable to check for ketones (in blood or urine)   Negative: [1] New-onset diabetes suspected (e.g., frequent urination, weak, weight loss) AND [2] vomiting or rapid breathing   Negative: Vomiting lasts > 4 " hours   Negative: Patient sounds very sick or weak to the triager   Negative: Fever > 100.4 F (38.0 C)    Protocols used: Diabetes - High Blood Sugar-A-AH

## 2023-08-01 ENCOUNTER — APPOINTMENT (OUTPATIENT)
Dept: CT IMAGING | Facility: CLINIC | Age: 43
End: 2023-08-01
Attending: EMERGENCY MEDICINE
Payer: COMMERCIAL

## 2023-08-01 ENCOUNTER — HOSPITAL ENCOUNTER (EMERGENCY)
Facility: CLINIC | Age: 43
Discharge: HOME | End: 2023-08-02
Attending: EMERGENCY MEDICINE | Admitting: EMERGENCY MEDICINE
Payer: COMMERCIAL

## 2023-08-01 DIAGNOSIS — Z79.4 TYPE 2 DIABETES MELLITUS WITH HYPERGLYCEMIA, WITH LONG-TERM CURRENT USE OF INSULIN (H): ICD-10-CM

## 2023-08-01 DIAGNOSIS — K86.3 PANCREATIC PSEUDOCYST: ICD-10-CM
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
## 2023-08-01 DIAGNOSIS — R10.13 ABDOMINAL PAIN, EPIGASTRIC: ICD-10-CM

## 2023-08-01 DIAGNOSIS — E13.10 DIABETIC KETOACIDOSIS WITHOUT COMA ASSOCIATED WITH OTHER SPECIFIED DIABETES MELLITUS (H): ICD-10-CM
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
## 2023-08-01 DIAGNOSIS — K76.0 FATTY INFILTRATION OF LIVER: ICD-10-CM

## 2023-08-01 DIAGNOSIS — E11.65 TYPE 2 DIABETES MELLITUS WITH HYPERGLYCEMIA, WITH LONG-TERM CURRENT USE OF INSULIN (H): ICD-10-CM

## 2023-08-01 DIAGNOSIS — K85.90 ACUTE PANCREATITIS WITHOUT INFECTION OR NECROSIS, UNSPECIFIED PANCREATITIS TYPE: ICD-10-CM

## 2023-08-01 DIAGNOSIS — R11.2 NAUSEA AND VOMITING, UNSPECIFIED VOMITING TYPE: ICD-10-CM

## 2023-08-01 LAB
ALBUMIN SERPL BCG-MCNC: 4.8 G/DL (ref 3.5–5.2)
ALP SERPL-CCNC: 84 U/L (ref 40–129)
ALT SERPL W P-5'-P-CCNC: 53 U/L (ref 0–70)
ANION GAP SERPL CALCULATED.3IONS-SCNC: 14 MMOL/L (ref 7–15)
ANION GAP SERPL CALCULATED.3IONS-SCNC: 20 MMOL/L (ref 7–15)
AST SERPL W P-5'-P-CCNC: 77 U/L (ref 0–45)
B-OH-BUTYR SERPL-SCNC: 0.6 MMOL/L
BASE EXCESS BLDV CALC-SCNC: -2.8 MMOL/L (ref -7.7–1.9)
BASOPHILS # BLD AUTO: 0 10E3/UL (ref 0–0.2)
BASOPHILS NFR BLD AUTO: 0 %
BILIRUB SERPL-MCNC: 0.3 MG/DL
BUN SERPL-MCNC: 21.3 MG/DL (ref 6–20)
BUN SERPL-MCNC: 21.6 MG/DL (ref 6–20)
CALCIUM SERPL-MCNC: 10 MG/DL (ref 8.6–10)
CALCIUM SERPL-MCNC: 9.6 MG/DL (ref 8.6–10)
CHLORIDE SERPL-SCNC: 101 MMOL/L (ref 98–107)
CHLORIDE SERPL-SCNC: 99 MMOL/L (ref 98–107)
CREAT SERPL-MCNC: 0.89 MG/DL (ref 0.67–1.17)
CREAT SERPL-MCNC: 1.04 MG/DL (ref 0.67–1.17)
DEPRECATED HCO3 PLAS-SCNC: 19 MMOL/L (ref 22–29)
DEPRECATED HCO3 PLAS-SCNC: 23 MMOL/L (ref 22–29)
EOSINOPHIL # BLD AUTO: 0.1 10E3/UL (ref 0–0.7)
EOSINOPHIL NFR BLD AUTO: 1 %
ERYTHROCYTE [DISTWIDTH] IN BLOOD BY AUTOMATED COUNT: 14.6 % (ref 10–15)
ETHANOL SERPL-MCNC: <0.01 G/DL
GFR SERPL CREATININE-BSD FRML MDRD: >90 ML/MIN/1.73M2
GFR SERPL CREATININE-BSD FRML MDRD: >90 ML/MIN/1.73M2
GLUCOSE SERPL-MCNC: 158 MG/DL (ref 70–99)
GLUCOSE SERPL-MCNC: 182 MG/DL (ref 70–99)
HCO3 BLDV-SCNC: 23 MMOL/L (ref 21–28)
HCT VFR BLD AUTO: 43 % (ref 40–53)
HGB BLD-MCNC: 14 G/DL (ref 13.3–17.7)
IMM GRANULOCYTES # BLD: 0 10E3/UL
IMM GRANULOCYTES NFR BLD: 0 %
LIPASE SERPL-CCNC: 43 U/L (ref 13–60)
LYMPHOCYTES # BLD AUTO: 2.3 10E3/UL (ref 0.8–5.3)
LYMPHOCYTES NFR BLD AUTO: 32 %
MCH RBC QN AUTO: 25.7 PG (ref 26.5–33)
MCHC RBC AUTO-ENTMCNC: 32.6 G/DL (ref 31.5–36.5)
MCV RBC AUTO: 79 FL (ref 78–100)
MONOCYTES # BLD AUTO: 0.3 10E3/UL (ref 0–1.3)
MONOCYTES NFR BLD AUTO: 5 %
NEUTROPHILS # BLD AUTO: 4.4 10E3/UL (ref 1.6–8.3)
NEUTROPHILS NFR BLD AUTO: 62 %
NRBC # BLD AUTO: 0 10E3/UL
NRBC BLD AUTO-RTO: 0 /100
O2/TOTAL GAS SETTING VFR VENT: 0 %
PCO2 BLDV: 40 MM HG (ref 40–50)
PH BLDV: 7.36 [PH] (ref 7.32–7.43)
PLATELET # BLD AUTO: 285 10E3/UL (ref 150–450)
PO2 BLDV: 58 MM HG (ref 25–47)
POTASSIUM SERPL-SCNC: 4.1 MMOL/L (ref 3.4–5.3)
POTASSIUM SERPL-SCNC: 4.2 MMOL/L (ref 3.4–5.3)
PROT SERPL-MCNC: 8.1 G/DL (ref 6.4–8.3)
RBC # BLD AUTO: 5.44 10E6/UL (ref 4.4–5.9)
SODIUM SERPL-SCNC: 138 MMOL/L (ref 136–145)
SODIUM SERPL-SCNC: 138 MMOL/L (ref 136–145)
WBC # BLD AUTO: 7.1 10E3/UL (ref 4–11)

## 2023-08-01 PROCEDURE — 96374 THER/PROPH/DIAG INJ IV PUSH: CPT | Mod: 59

## 2023-08-01 PROCEDURE — 96376 TX/PRO/DX INJ SAME DRUG ADON: CPT

## 2023-08-01 PROCEDURE — 96361 HYDRATE IV INFUSION ADD-ON: CPT

## 2023-08-01 PROCEDURE — 250N000011 HC RX IP 250 OP 636: Mod: JZ | Performed by: EMERGENCY MEDICINE

## 2023-08-01 PROCEDURE — 82803 BLOOD GASES ANY COMBINATION: CPT | Performed by: EMERGENCY MEDICINE

## 2023-08-01 PROCEDURE — 250N000011 HC RX IP 250 OP 636: Performed by: EMERGENCY MEDICINE

## 2023-08-01 PROCEDURE — 36415 COLL VENOUS BLD VENIPUNCTURE: CPT | Performed by: EMERGENCY MEDICINE

## 2023-08-01 PROCEDURE — 85025 COMPLETE CBC W/AUTO DIFF WBC: CPT | Performed by: EMERGENCY MEDICINE

## 2023-08-01 PROCEDURE — 96375 TX/PRO/DX INJ NEW DRUG ADDON: CPT

## 2023-08-01 PROCEDURE — 258N000003 HC RX IP 258 OP 636: Performed by: EMERGENCY MEDICINE

## 2023-08-01 PROCEDURE — 80053 COMPREHEN METABOLIC PANEL: CPT | Performed by: EMERGENCY MEDICINE

## 2023-08-01 PROCEDURE — 99285 EMERGENCY DEPT VISIT HI MDM: CPT | Mod: 25

## 2023-08-01 PROCEDURE — 74177 CT ABD & PELVIS W/CONTRAST: CPT

## 2023-08-01 PROCEDURE — 82310 ASSAY OF CALCIUM: CPT | Performed by: EMERGENCY MEDICINE

## 2023-08-01 PROCEDURE — 82010 KETONE BODYS QUAN: CPT | Performed by: EMERGENCY MEDICINE

## 2023-08-01 PROCEDURE — 250N000009 HC RX 250: Performed by: EMERGENCY MEDICINE

## 2023-08-01 PROCEDURE — 82077 ASSAY SPEC XCP UR&BREATH IA: CPT | Performed by: EMERGENCY MEDICINE

## 2023-08-01 PROCEDURE — 83690 ASSAY OF LIPASE: CPT | Performed by: EMERGENCY MEDICINE

## 2023-08-01 RX ORDER — KETOROLAC TROMETHAMINE 15 MG/ML
15 INJECTION, SOLUTION INTRAMUSCULAR; INTRAVENOUS ONCE
Status: COMPLETED | OUTPATIENT
Start: 2023-08-01 | End: 2023-08-01

## 2023-08-01 RX ORDER — ONDANSETRON 2 MG/ML
4 INJECTION INTRAMUSCULAR; INTRAVENOUS ONCE
Status: COMPLETED | OUTPATIENT
Start: 2023-08-01 | End: 2023-08-01

## 2023-08-01 RX ORDER — HYDROMORPHONE HCL IN WATER/PF 6 MG/30 ML
0.2 PATIENT CONTROLLED ANALGESIA SYRINGE INTRAVENOUS ONCE
Status: COMPLETED | OUTPATIENT
Start: 2023-08-01 | End: 2023-08-01

## 2023-08-01 RX ORDER — IOPAMIDOL 755 MG/ML
500 INJECTION, SOLUTION INTRAVASCULAR ONCE
Status: COMPLETED | OUTPATIENT
Start: 2023-08-01 | End: 2023-08-01

## 2023-08-01 RX ADMIN — SODIUM CHLORIDE 65 ML: 9 INJECTION, SOLUTION INTRAVENOUS at 22:30

## 2023-08-01 RX ADMIN — KETOROLAC TROMETHAMINE 15 MG: 15 INJECTION, SOLUTION INTRAMUSCULAR; INTRAVENOUS at 23:21

## 2023-08-01 RX ADMIN — SODIUM CHLORIDE 1000 ML: 9 INJECTION, SOLUTION INTRAVENOUS at 22:06

## 2023-08-01 RX ADMIN — ONDANSETRON 4 MG: 2 INJECTION INTRAMUSCULAR; INTRAVENOUS at 22:06

## 2023-08-01 RX ADMIN — HYDROMORPHONE HYDROCHLORIDE 0.2 MG: 0.2 INJECTION, SOLUTION INTRAMUSCULAR; INTRAVENOUS; SUBCUTANEOUS at 22:06

## 2023-08-01 RX ADMIN — FAMOTIDINE 20 MG: 10 INJECTION, SOLUTION INTRAVENOUS at 22:06

## 2023-08-01 RX ADMIN — IOPAMIDOL 100 ML: 755 INJECTION, SOLUTION INTRAVENOUS at 22:30

## 2023-08-01 RX ADMIN — ONDANSETRON 4 MG: 2 INJECTION INTRAMUSCULAR; INTRAVENOUS at 23:21

## 2023-08-01 ASSESSMENT — ACTIVITIES OF DAILY LIVING (ADL)
ADLS_ACUITY_SCORE: 35
ADLS_ACUITY_SCORE: 35

## 2023-08-02 VITALS
HEIGHT: 71 IN | OXYGEN SATURATION: 97 % | WEIGHT: 200 LBS | TEMPERATURE: 97.6 F | HEART RATE: 83 BPM | BODY MASS INDEX: 28 KG/M2 | RESPIRATION RATE: 18 BRPM | SYSTOLIC BLOOD PRESSURE: 126 MMHG | DIASTOLIC BLOOD PRESSURE: 85 MMHG

## 2023-08-02 RX ORDER — ONDANSETRON 4 MG/1
4 TABLET, ORALLY DISINTEGRATING ORAL EVERY 6 HOURS PRN
Qty: 10 TABLET | Refills: 0 | Status: SHIPPED | OUTPATIENT
Start: 2023-08-02 | End: 2023-12-23

## 2023-08-02 NOTE — ED PROVIDER NOTES
History     Chief Complaint:  Abdominal Pain and Fall    The history is provided by the patient.      Josselyn Brewster is a 42 year old male with history of type 2 diabetes mellitus on insulin and hypertension who presents alone for evaluation of abdominal pain and emesis.  After eating baked chicken about 90 minutes prior to arrival his chronic epigastric pain worsened and he had nonbloody vomiting.  This worsened abdominal pain, currently 7 out of 10, persisted and prompted his visit.  He also wants to mention that he had a mechanical trip and fall which occurred after the increased pain and vomiting.  He did hit his head on the carpeted floor and did not lose consciousness.  He is not anticoagulated.  He has not had any analgesics today.  He denies any other symptoms outside of loose stools for the last couple of days.  He denies ongoing alcohol use.    Independent Historian:   None - Patient Only    Review of External Notes:   He was admitted to [facility] on 7/13/23-7/16/23 for recurrent pancreatitis.    On 7/26/23, he called nurse triage for hyperglycemia of 419. He also reported some abdominal pain, although not as severe as his previous pain during hospitalization.    He had a primary care telehealth visit scheduled for today, but could not be reached via phone call or text to complete the visit. He states he could not connect to the telehealth visit.    Medications:    Celexa  Folvite  Insulin   Norvasc  Cozaar  Lioresal  Lopid    Past Medical History:    Arthritis   Diabetes mellitus, type 2  Spinal stenosis  Sciatica   Right lumbar radiculopathy  Hypertension   Hyperlipidemia   Herniated disc  DDD  Osteoarthritis  MDD  DARYL  Cocaine dependence  Opiate dependence  Suicidal ideation     Past Surgical History:    Hernia repair  Laminectomy   IR lumbar epidural steroid injection x3    Physical Exam   Patient Vitals for the past 24 hrs:   BP Temp Temp src Pulse Resp SpO2 Height Weight   08/01/23 2300 (!) 141/91 --  "-- 97 -- 96 % -- --   08/01/23 2206 (!) 142/93 -- -- 99 -- 98 % -- --   08/01/23 2100 137/89 -- -- 103 18 97 % -- --   08/01/23 2045 (!) 136/92 -- -- -- 18 97 % -- --   08/01/23 2012 (!) 142/123 97.6  F (36.4  C) Oral 109 18 98 % 1.803 m (5' 11\") 90.7 kg (200 lb)     Physical Exam  General: Well-developed and well-nourished. Well appearing middle-aged man. Cooperative.  Head:  Very superficial appearing abrasion over the right forehead.  Eyes:  Conjunctivae, lids, and sclerae are normal.  ENT:    Normal nose. Moist mucous membranes.  Neck:  Supple. Normal range of motion.  CV:  Regular rate and rhythm. Normal heart sounds with no murmurs, rubs, or gallops detected.  Resp:  No respiratory distress. Clear to auscultation bilaterally without decreased breath sounds, wheezing, rales, or rhonchi.  GI:  Soft. Non-distended.  Epigastric tenderness.    MS:  Normal ROM.   Skin:  Warm. Non-diaphoretic. No pallor.  Neuro:  Awake. A&Ox3. Normal strength.  Psych: Normal mood and affect. Normal speech.  Vitals reviewed.    Emergency Department Course     Imaging:  CT Abdomen Pelvis w Contrast   Final Result   IMPRESSION:    1.  Improving pancreatitis and pancreatic pseudocyst. No new or worsening pancreatic abnormality.   2.  Diffuse fatty infiltration of the liver.   3.  No bowel obstruction or inflammation.         Report per radiology    Laboratory:  Labs Ordered and Resulted from Time of ED Arrival to Time of ED Departure   COMPREHENSIVE METABOLIC PANEL - Abnormal       Result Value    Sodium 138      Potassium 4.1      Chloride 99      Carbon Dioxide (CO2) 19 (*)     Anion Gap 20 (*)     Urea Nitrogen 21.6 (*)     Creatinine 1.04      Calcium 10.0      Glucose 182 (*)     Alkaline Phosphatase 84      AST 77 (*)     ALT 53      Protein Total 8.1      Albumin 4.8      Bilirubin Total 0.3      GFR Estimate >90     CBC WITH PLATELETS AND DIFFERENTIAL - Abnormal    WBC Count 7.1      RBC Count 5.44      Hemoglobin 14.0      " Hematocrit 43.0      MCV 79      MCH 25.7 (*)     MCHC 32.6      RDW 14.6      Platelet Count 285      % Neutrophils 62      % Lymphocytes 32      % Monocytes 5      % Eosinophils 1      % Basophils 0      % Immature Granulocytes 0      NRBCs per 100 WBC 0      Absolute Neutrophils 4.4      Absolute Lymphocytes 2.3      Absolute Monocytes 0.3      Absolute Eosinophils 0.1      Absolute Basophils 0.0      Absolute Immature Granulocytes 0.0      Absolute NRBCs 0.0     KETONE BETA-HYDROXYBUTYRATE QUANTITATIVE, RAPID - Abnormal    Ketone (Beta-Hydroxybutyrate) Quantitative 0.60 (*)    BLOOD GAS VENOUS - Abnormal    pH Venous 7.36      pCO2 Venous 40      pO2 Venous 58 (*)     Bicarbonate Venous 23      Base Excess/Deficit (+/-) -2.8      FIO2 0     BASIC METABOLIC PANEL - Abnormal    Sodium 138      Potassium 4.2      Chloride 101      Carbon Dioxide (CO2) 23      Anion Gap 14      Urea Nitrogen 21.3 (*)     Creatinine 0.89      Calcium 9.6      Glucose 158 (*)     GFR Estimate >90     LIPASE - Normal    Lipase 43     ETHYL ALCOHOL LEVEL - Normal    Alcohol ethyl <0.01       Emergency Department Course & Assessments:    Interventions:  Medications   0.9% sodium chloride BOLUS (1,000 mLs Intravenous Stopped 8/1/23 2343)   ondansetron (ZOFRAN) injection 4 mg (4 mg Intravenous $Given 8/1/23 2206)   HYDROmorphone (DILAUDID) injection 0.2 mg (0.2 mg Intravenous $Given 8/1/23 2206)   famotidine (PEPCID) injection 20 mg (20 mg Intravenous $Given 8/1/23 2206)   ondansetron (ZOFRAN) injection 4 mg (4 mg Intravenous $Given 8/1/23 2321)   ketorolac (TORADOL) injection 15 mg (15 mg Intravenous $Given 8/1/23 2321)     Assessments:  2117 I obtained history and examined the patient as noted above.  0030 I rechecked the patient and explained findings. Tolerating PO. Significant other now at bedside. I answered all questions. Patient discharged home with instructions regarding supportive care, medications, and reasons to return. The  importance of close follow-up was reviewed.     Independent Interpretation (X-rays, CTs, rhythm strip):  I independently interpreted his abdomen/pelvis CT and agree with radiology.    Consultations/Discussion of Management or Tests:  Not applicable    Social Determinants of Health affecting care:   Supportive significant other. Established care providers.    Disposition:  The patient was discharged.     Impression & Plan    Medical Decision Making:  Josselyn is a 42-year-old man with recent hospitalization for pancreatitis who presents with worsening of this ongoing epigastric pain as well as vomiting that started about 90 minutes prior to arrival.  He also mentions he had a mechanical trip and fall.  I asked several times and he clarifies that he had vomiting before the fall. He did hit his head but did not lose consciousness.  He is not anticoagulated and does not require brain imaging as per [place] head CT rule.  He appears well on exam but with epigastric tenderness.    His lipase is normal but he had only minimal elevations when CT imaging proved pancreatitis at his last visit so I did proceed with CT of the abdomen and pelvis.  Fortunately, this shows the pancreatitis and pseudocyst are improving compared to prior image.  There is incidental fatty infiltration of the liver but no acute pathology otherwise identified.  AST is mildly elevated at 77 without other abnormal LFTs.  BAL is undetectable.  There is no leukocytosis or anemia.  He did initially have an anion gap metabolic acidosis with hyperglycemia of 182.  Ketones were mildly elevated at 0.6 although pH was normal at 7.36 with a bicarb of 40.  This is not representative of DKA.  He was given 1 L of IV fluids and had normalization of his anion gap and bicarb with improved glucose to 158.    In addition to fluids he was treated with Zofran, Dilaudid, Pepcid, and Toradol.  On repeat evaluation he is overall feeling improved and he and his significant  other are comfortable with plan for discharge.  He does have an appointment with GI scheduled which he will attend.  I also encouraged primary care follow-up and he is to arrange an endocrinology appointment as well.  I will discharge him home with Zofran as needed for nausea and vomiting and I encouraged a very bland diet with small meals.  He agrees to return if he has worsening or new symptoms.  All questions answered.  Amenable to discharge.    Diagnosis:   ICD-10-CM    Acute pancreatitis without infection or necrosis, unspecified pancreatitis type  K85.90       Pancreatic pseudocyst  K86.3       Type 2 diabetes mellitus with hyperglycemia, with long-term current use of insulin (H)  E11.65     Z79.4       Fatty infiltration of liver  K76.0       Abdominal pain, epigastric  R10.13       Nausea and vomiting, unspecified vomiting type  R11.2       Fall at home, initial encounter  W19.XXXA     Y92.009            Discharge Medications:  Discharge Medication List as of 8/2/2023 12:41 AM        START taking these medications    Details   ondansetron (ZOFRAN ODT) 4 MG ODT tab Take 1 tablet (4 mg) by mouth every 6 hours as needed for nausea or vomiting, Disp-10 tablet, R-0, E-Prescribe             Scribe Disclosure:  I, Sara Taylor, am serving as a scribe at 9:15 PM on 8/1/2023 to document services personally performed by Gabby Luis MD based on my observations and the provider's statements to me.   8/1/2023   Gabby Luis MD Dixson, Kylie S, MD  08/04/23 1002

## 2023-08-02 NOTE — ED TRIAGE NOTES
Pt here today with c/o abdominal pain. Pt was recently diagnosed with diabetes. Pt states he also had a mechanical fall and hit his head; vomited 15-20 minutes afterwards. Denies LOC.  at home prior to arrival

## 2023-08-02 NOTE — DISCHARGE INSTRUCTIONS
Zofran sent to pharmacy as needed for nausea or vomiting.  [place] diet.  I would recommend small meals.  Follow-up with primary care, GI, and endocrinology.  Return if you have worsening symptoms or new concerns of any kind.

## 2023-10-28 ENCOUNTER — APPOINTMENT (OUTPATIENT)
Dept: ULTRASOUND IMAGING | Facility: CLINIC | Age: 43
End: 2023-10-28
Attending: EMERGENCY MEDICINE

## 2023-10-28 ENCOUNTER — HOSPITAL ENCOUNTER (EMERGENCY)
Facility: CLINIC | Age: 43
Discharge: HOME | End: 2023-10-29
Attending: EMERGENCY MEDICINE | Admitting: EMERGENCY MEDICINE

## 2023-10-28 ENCOUNTER — APPOINTMENT (OUTPATIENT)
Dept: GENERAL RADIOLOGY | Facility: CLINIC | Age: 43
End: 2023-10-28
Attending: EMERGENCY MEDICINE

## 2023-10-28 DIAGNOSIS — K76.0 FATTY INFILTRATION OF LIVER: ICD-10-CM

## 2023-10-28 DIAGNOSIS — S39.011A STRAIN OF ABDOMINAL WALL, INITIAL ENCOUNTER: ICD-10-CM

## 2023-10-28 DIAGNOSIS — R74.8 ELEVATED LIVER ENZYMES: ICD-10-CM

## 2023-10-28 DIAGNOSIS — R05.1 ACUTE COUGH: ICD-10-CM

## 2023-10-28 DIAGNOSIS — R73.9 HYPERGLYCEMIA: ICD-10-CM

## 2023-10-28 LAB
ALBUMIN SERPL BCG-MCNC: 4.6 G/DL (ref 3.5–5.2)
ALP SERPL-CCNC: 92 U/L (ref 40–129)
ALT SERPL W P-5'-P-CCNC: 131 U/L (ref 0–70)
ANION GAP SERPL CALCULATED.3IONS-SCNC: 16 MMOL/L (ref 7–15)
AST SERPL W P-5'-P-CCNC: 116 U/L (ref 0–45)
B-OH-BUTYR SERPL-SCNC: 0.2 MMOL/L
BASOPHILS # BLD AUTO: 0 10E3/UL (ref 0–0.2)
BASOPHILS NFR BLD AUTO: 0 %
BILIRUB SERPL-MCNC: 0.2 MG/DL
BUN SERPL-MCNC: 10.2 MG/DL (ref 6–20)
CALCIUM SERPL-MCNC: 9.3 MG/DL (ref 8.6–10)
CHLORIDE SERPL-SCNC: 99 MMOL/L (ref 98–107)
CREAT SERPL-MCNC: 1.05 MG/DL (ref 0.67–1.17)
DEPRECATED HCO3 PLAS-SCNC: 20 MMOL/L (ref 22–29)
EGFRCR SERPLBLD CKD-EPI 2021: 90 ML/MIN/1.73M2
EOSINOPHIL # BLD AUTO: 0.1 10E3/UL (ref 0–0.7)
EOSINOPHIL NFR BLD AUTO: 1 %
ERYTHROCYTE [DISTWIDTH] IN BLOOD BY AUTOMATED COUNT: 13.4 % (ref 10–15)
FLUAV RNA SPEC QL NAA+PROBE: NEGATIVE
FLUBV RNA RESP QL NAA+PROBE: NEGATIVE
GLUCOSE SERPL-MCNC: 360 MG/DL (ref 70–99)
HCO3 BLDV-SCNC: 20 MMOL/L (ref 21–28)
HCT VFR BLD AUTO: 43 % (ref 40–53)
HGB BLD-MCNC: 14.3 G/DL (ref 13.3–17.7)
HOLD SPECIMEN: NORMAL
IMM GRANULOCYTES # BLD: 0.1 10E3/UL
IMM GRANULOCYTES NFR BLD: 1 %
LACTATE BLD-SCNC: 3 MMOL/L
LIPASE SERPL-CCNC: 31 U/L (ref 13–60)
LYMPHOCYTES # BLD AUTO: 2 10E3/UL (ref 0.8–5.3)
LYMPHOCYTES NFR BLD AUTO: 27 %
MCH RBC QN AUTO: 25.5 PG (ref 26.5–33)
MCHC RBC AUTO-ENTMCNC: 33.3 G/DL (ref 31.5–36.5)
MCV RBC AUTO: 77 FL (ref 78–100)
MONOCYTES # BLD AUTO: 0.6 10E3/UL (ref 0–1.3)
MONOCYTES NFR BLD AUTO: 7 %
NEUTROPHILS # BLD AUTO: 4.7 10E3/UL (ref 1.6–8.3)
NEUTROPHILS NFR BLD AUTO: 64 %
NRBC # BLD AUTO: 0 10E3/UL
NRBC BLD AUTO-RTO: 0 /100
PCO2 BLDV: 37 MM HG (ref 40–50)
PH BLDV: 7.35 [PH] (ref 7.32–7.43)
PLATELET # BLD AUTO: 287 10E3/UL (ref 150–450)
PO2 BLDV: 64 MM HG (ref 25–47)
POTASSIUM SERPL-SCNC: 4.1 MMOL/L (ref 3.4–5.3)
PROT SERPL-MCNC: 7.9 G/DL (ref 6.4–8.3)
RBC # BLD AUTO: 5.6 10E6/UL (ref 4.4–5.9)
RSV RNA SPEC NAA+PROBE: NEGATIVE
SAO2 % BLDV: 91 % (ref 94–100)
SARS-COV-2 RNA RESP QL NAA+PROBE: NEGATIVE
SODIUM SERPL-SCNC: 135 MMOL/L (ref 135–145)
WBC # BLD AUTO: 7.5 10E3/UL (ref 4–11)

## 2023-10-28 PROCEDURE — 82803 BLOOD GASES ANY COMBINATION: CPT

## 2023-10-28 PROCEDURE — 80053 COMPREHEN METABOLIC PANEL: CPT | Performed by: EMERGENCY MEDICINE

## 2023-10-28 PROCEDURE — 99285 EMERGENCY DEPT VISIT HI MDM: CPT | Mod: 25

## 2023-10-28 PROCEDURE — 83690 ASSAY OF LIPASE: CPT | Performed by: EMERGENCY MEDICINE

## 2023-10-28 PROCEDURE — 87637 SARSCOV2&INF A&B&RSV AMP PRB: CPT | Performed by: EMERGENCY MEDICINE

## 2023-10-28 PROCEDURE — 96361 HYDRATE IV INFUSION ADD-ON: CPT

## 2023-10-28 PROCEDURE — 71046 X-RAY EXAM CHEST 2 VIEWS: CPT

## 2023-10-28 PROCEDURE — 258N000003 HC RX IP 258 OP 636: Performed by: EMERGENCY MEDICINE

## 2023-10-28 PROCEDURE — 96375 TX/PRO/DX INJ NEW DRUG ADDON: CPT

## 2023-10-28 PROCEDURE — 250N000011 HC RX IP 250 OP 636: Mod: JZ | Performed by: EMERGENCY MEDICINE

## 2023-10-28 PROCEDURE — 250N000013 HC RX MED GY IP 250 OP 250 PS 637: Performed by: EMERGENCY MEDICINE

## 2023-10-28 PROCEDURE — 82010 KETONE BODYS QUAN: CPT | Performed by: EMERGENCY MEDICINE

## 2023-10-28 PROCEDURE — 83605 ASSAY OF LACTIC ACID: CPT

## 2023-10-28 PROCEDURE — 36415 COLL VENOUS BLD VENIPUNCTURE: CPT | Performed by: EMERGENCY MEDICINE

## 2023-10-28 PROCEDURE — 76705 ECHO EXAM OF ABDOMEN: CPT

## 2023-10-28 PROCEDURE — 85025 COMPLETE CBC W/AUTO DIFF WBC: CPT | Performed by: EMERGENCY MEDICINE

## 2023-10-28 PROCEDURE — 96374 THER/PROPH/DIAG INJ IV PUSH: CPT

## 2023-10-28 RX ORDER — KETOROLAC TROMETHAMINE 15 MG/ML
15 INJECTION, SOLUTION INTRAMUSCULAR; INTRAVENOUS ONCE
Status: COMPLETED | OUTPATIENT
Start: 2023-10-28 | End: 2023-10-28

## 2023-10-28 RX ORDER — ONDANSETRON 2 MG/ML
4 INJECTION INTRAMUSCULAR; INTRAVENOUS EVERY 30 MIN PRN
Status: DISCONTINUED | OUTPATIENT
Start: 2023-10-28 | End: 2023-10-29 | Stop reason: HOSPADM

## 2023-10-28 RX ORDER — OXYCODONE HYDROCHLORIDE 5 MG/1
10 TABLET ORAL ONCE
Status: COMPLETED | OUTPATIENT
Start: 2023-10-28 | End: 2023-10-28

## 2023-10-28 RX ADMIN — ONDANSETRON 4 MG: 2 INJECTION INTRAMUSCULAR; INTRAVENOUS at 20:51

## 2023-10-28 RX ADMIN — SODIUM CHLORIDE 1000 ML: 9 INJECTION, SOLUTION INTRAVENOUS at 20:41

## 2023-10-28 RX ADMIN — KETOROLAC TROMETHAMINE 15 MG: 15 INJECTION, SOLUTION INTRAMUSCULAR; INTRAVENOUS at 20:51

## 2023-10-28 RX ADMIN — SODIUM CHLORIDE, POTASSIUM CHLORIDE, SODIUM LACTATE AND CALCIUM CHLORIDE 1000 ML: 600; 310; 30; 20 INJECTION, SOLUTION INTRAVENOUS at 22:48

## 2023-10-28 RX ADMIN — OXYCODONE HYDROCHLORIDE 10 MG: 5 TABLET ORAL at 20:51

## 2023-10-28 ASSESSMENT — ACTIVITIES OF DAILY LIVING (ADL)
ADLS_ACUITY_SCORE: 35
ADLS_ACUITY_SCORE: 35

## 2023-10-29 VITALS
RESPIRATION RATE: 23 BRPM | OXYGEN SATURATION: 96 % | BODY MASS INDEX: 30.1 KG/M2 | HEART RATE: 91 BPM | WEIGHT: 215 LBS | SYSTOLIC BLOOD PRESSURE: 165 MMHG | DIASTOLIC BLOOD PRESSURE: 114 MMHG | HEIGHT: 71 IN | TEMPERATURE: 98 F

## 2023-10-29 LAB — LACTATE SERPL-SCNC: 2.1 MMOL/L (ref 0.7–2)

## 2023-10-29 PROCEDURE — 83605 ASSAY OF LACTIC ACID: CPT | Performed by: EMERGENCY MEDICINE

## 2023-10-29 PROCEDURE — 250N000013 HC RX MED GY IP 250 OP 250 PS 637: Performed by: EMERGENCY MEDICINE

## 2023-10-29 PROCEDURE — 36415 COLL VENOUS BLD VENIPUNCTURE: CPT

## 2023-10-29 RX ORDER — CODEINE PHOSPHATE AND GUAIFENESIN 10; 100 MG/5ML; MG/5ML
1-2 SOLUTION ORAL EVERY 4 HOURS PRN
Qty: 180 ML | Refills: 0 | Status: SHIPPED | OUTPATIENT
Start: 2023-10-29 | End: 2023-12-23

## 2023-10-29 RX ORDER — BENZONATATE 100 MG/1
100 CAPSULE ORAL 3 TIMES DAILY PRN
Qty: 20 CAPSULE | Refills: 0 | Status: SHIPPED | OUTPATIENT
Start: 2023-10-29 | End: 2023-12-23

## 2023-10-29 RX ORDER — CODEINE PHOSPHATE AND GUAIFENESIN 10; 100 MG/5ML; MG/5ML
10 SOLUTION ORAL EVERY 4 HOURS PRN
Status: DISCONTINUED | OUTPATIENT
Start: 2023-10-29 | End: 2023-10-29 | Stop reason: HOSPADM

## 2023-10-29 RX ADMIN — GUAIFENESIN AND CODEINE PHOSPHATE 10 ML: 100; 10 SOLUTION ORAL at 01:26

## 2023-10-29 ASSESSMENT — ACTIVITIES OF DAILY LIVING (ADL): ADLS_ACUITY_SCORE: 35

## 2023-10-29 NOTE — DISCHARGE INSTRUCTIONS
-Take acetaminophen 500 to 1000 mg by mouth every 4 to 6 hours as needed for pain or fever.  Do not take more than 4000 mg in 24 hours.  Do not take within 6 hours of another acetaminophen containing medication such as norco (vicodin) or percocet.  - Take ibuprofen 600 to 800 mg by mouth every 6 to 8 hours as needed for pain or fever      Discharge Instructions  Bronchitis, Pneumonia, Bronchospasm    You were seen today for a chest infection or inflammation. If your provider decided this was due to a bacterial infection, you may need an antibiotic. Sometimes these are caused by a virus, and then an antibiotic will not help.     Generally, every Emergency Department visit should have a follow-up clinic visit with either a primary or a specialty clinic/provider. Please follow-up as instructed by your emergency provider today.    Return to the Emergency Department if:  Your breathing gets much worse.  You are very weak, or feel much more ill.  You develop new symptoms, such as chest pain.  You cough up blood.  You are vomiting (throwing up) enough that you cannot keep fluids or your medicine down.    What can I do to help myself?  Fill any prescriptions the provider gave you and take them right away--especially antibiotics. Be sure to finish the whole antibiotic prescription.  You may be given a prescription for an inhaler, which can help loosen tight air passages.  Use this as needed, but not more often than directed. Inhalers work much better when used with a spacer.   You may be given a prescription for a steroid to reduce inflammation. Used long-term, these can have side effects, but for short-term use they are safe. You may notice restlessness or increased appetite.      You may use non-prescription cough or cold medicines. Cough medicines may help, but don t make the cough go away completely.   Avoid smoke, because this can make your symptoms worse. If you smoke, this may be a good time to quit! Consider using  nicotine lozenges, gum, or patches to reduce cravings.   If you have a fever, Tylenol  (acetaminophen), Motrin  (ibuprofen), or Advil  (ibuprofen) may help bring fever down and may help you feel more comfortable. Be sure to read and follow the package directions, and ask your provider if you have questions.  Be sure to get your flu shot each year.  For certain ages, the pneumonia shot can help prevent pneumonia.  If you were given a prescription for medicine here today, be sure to read all of the information (including the package insert) that comes with your prescription.  This will include important information about the medicine, its side effects, and any warnings that you need to know about.  The pharmacist who fills the prescription can provide more information and answer questions you may have about the medicine.  If you have questions or concerns that the pharmacist cannot address, please call or return to the Emergency Department.     Remember that you can always come back to the Emergency Department if you are not able to see your regular provider in the amount of time listed above, if you get any new symptoms, or if there is anything that worries you.    Discharge Instructions  Hyperglycemia, High Blood Sugar    Today we found your blood sugar (glucose) was high. This may mean that you have developed diabetes, or if you already know that you have diabetes, it may mean that your diabetes is not as well controlled as it should be. Sometimes blood sugar can be high temporarily and it is not diabetes. Signs of elevated blood sugar include increased thirst, frequent urination (peeing), blurred vision, fatigue, unexplained weight loss, poor wound healing, and frequent infections.    We sometimes give medicine in the Emergency Department to lower the blood sugar. We may also prescribe medicine for you to use at home, or increase the medicine that you already take. While we do not like to see your blood sugar  high, it is much more dangerous to let your blood sugar get too low, so it is reasonable to take time to bring it down, or to wait and watch to see if it comes down on its own.    Generally, every Emergency Department visit should have a follow-up clinic visit with either a primary or a specialty clinic/provider. Please follow-up as instructed by your emergency provider today.     Return to the Emergency Department if you develop:  Vomiting (throwing up).  Confusion, disorientation, or being unable to wake up.  Severe weakness or illness.  Abdominal (belly) pain.    What can I do to help myself?  Check your blood sugar as instructed by your provider.  Take medications prescribed by your provider.  Follow a diabetic diet (low fat, low concentrated sweets, high fiber).  Exercise regularly.  Moderate or eliminate alcohol use.  Stop smoking.    Diabetes: Diabetes mellitus is a disease in which the body cannot regulate the amount of sugar (glucose) in the blood. Insulin allows glucose to move out of the blood into cells throughout the body where it is used for fuel. People with diabetes do not produce enough insulin (type 1 diabetes), or cannot use insulin properly (type 2 diabetes), or both. This starves the cells that need the glucose for fuel, and also harms certain organs and tissues exposed to the high glucose levels.  Over a long period of time, uncontrolled diabetes can lead to heart and blood vessel disease, blindness, kidney failure, foot ulcers and many other problems.          About 17 million Americans (6.2% of adults) have diabetes. We think that about one third of adults with diabetes do not know they have diabetes.  The incidence of diabetes is increasing rapidly. This increase is due to many factors, but the most significant are our increasing weight and decreased activity levels.     Diabetes can be a very serious and life-threatening illness if not treated.  If you were given a prescription for medicine  here today, be sure to read all of the information (including the package insert) that comes with your prescription.  This will include important information about the medicine, its side effects, and any warnings that you need to know about.  The pharmacist who fills the prescription can provide more information and answer questions you may have about the medicine.  If you have questions or concerns that the pharmacist cannot address, please call or return to the Emergency Department.   Remember that you can always come back to the Emergency Department if you are not able to see your regular provider in the amount of time listed above, if you get any new symptoms, or if there is anything that worries you.

## 2023-10-29 NOTE — ED TRIAGE NOTES
Yesterday pain in chest with breathing. Frequent cough. Voice is hoarse. Denies fevers. Also with diarrhea. Also notes fatigue.

## 2023-10-29 NOTE — ED PROVIDER NOTES
History     Chief Complaint:  Cough, Chest Wall Pain, and Diarrhea       HPI   Josselyn Brewster is a 43 year old male with a past medical history significant for type 2 diabetes who presents to the ED via/accompanied by peterancé with a chief complaint of cough onset 1 week ago and gradually worsening now with sudden onset moderate to severe nonradiating inferior left chest/left lower quadrant abdominal pain while coughing, initially intermittent and not persistent.  Patient endorses an associated hoarse voice and reports his fiancée had a cold approximately 1.5 weeks ago.  He endorses nonbloody diarrhea but denies fevers, chills, vomiting, overt shortness of breath.  He reports his symptoms feel somewhat similar to his pancreatitis.      Independent Historian: history provided by the patient     Review of External Notes: See MDM    ROS:  Review of Systems  Full ROS completed and negative other than pertinent positives and negatives noted in HPI    Allergies:  Levofloxacin  Morphine     Medications:    acetaminophen (TYLENOL) 500 MG tablet  Alcohol Swabs PADS  Alcohol Swabs PADS  blood glucose (NO BRAND SPECIFIED) lancets standard  blood glucose (NO BRAND SPECIFIED) test strip  blood glucose (NO BRAND SPECIFIED) test strip  blood glucose calibration (NO BRAND SPECIFIED) solution  blood glucose monitoring (NO BRAND SPECIFIED) meter device kit  blood glucose monitoring (NO BRAND SPECIFIED) meter device kit  citalopram (CELEXA) 40 MG tablet  Continuous Blood Gluc  (DEXCOM G6 ) BRENDA  Continuous Blood Gluc Sensor (DEXCOM G6 SENSOR) MISC  Continuous Blood Gluc Transmit (DEXCOM G6 TRANSMITTER) MISC  gemfibrozil (LOPID) 600 MG tablet  glucose (BD GLUCOSE) 4 g chewable tablet  ibuprofen (ADVIL/MOTRIN) 200 MG tablet  insulin aspart (NOVOLOG PEN) 100 UNIT/ML pen  insulin glargine (LANTUS PEN) 100 UNIT/ML pen  ondansetron (ZOFRAN ODT) 4 MG ODT tab  oxyCODONE (ROXICODONE) 10 MG tablet  [employer]ar[employer] Container  "MISC        Past Medical History:    Past Medical History:   Diagnosis Date    NONSPECIFIC MEDICAL HISTORY        Past Surgical History:    Past Surgical History:   Procedure Laterality Date    IR LUMBAR EPIDURAL STEROID INJECTION  6/13/2006    IR LUMBAR EPIDURAL STEROID INJECTION  6/13/2006    IR LUMBAR EPIDURAL STEROID INJECTION  8/1/2006    ZZC NONSPECIFIC PROCEDURE      laminectoomy  1999        Family History:    family history is not on file.    Social History:   reports that he has never smoked. He does not have any smokeless tobacco history on file. He reports current alcohol use of about 0.8 - 1.7 standard drinks of alcohol per week.  PCP: [facility], [facility] [place]     Physical Exam   Patient Vitals for the past 24 hrs:   BP Temp Temp src Pulse Resp SpO2 Height Weight   10/28/23 1945 112/74 98  F (36.7  C) Temporal 108 24 97 % 1.803 m (5' 11\") 97.5 kg (215 lb)        Physical Exam  Constitutional: Well developed, nontox appearance, persistent coughing  Head: Atraumatic.   Mouth/Throat: Oropharynx is clear and moist.   Neck:  no stridor, no LAD, no meningismus  Eyes: no scleral icterus  Cardiovascular: Borderline regular tachycardia, 2+ bilat radial pulses  Pulmonary/Chest: nml resp effort, Clear BS bilat, left anterior inferior rib tenderness  Abdominal: ND, soft, epigastric and left upper quadrant tenderness, no rebound or guarding   Ext: Warm, well perfused  Neurological: A&O, symmetric facies, moves ext x4  Skin: Skin is warm and dry.   Psychiatric: Behavior is normal. Thought content normal.   Nursing note and vitals reviewed.    Emergency Department Course   ECG:  ECG results from 06/28/23   EKG 12-lead, tracing only     Value    Systolic Blood Pressure     Diastolic Blood Pressure     Ventricular Rate 90    Atrial Rate 90    KS Interval 158    QRS Duration 102        QTc 457    P Axis 33    R AXIS 43    T Axis 66    Interpretation ECG      Sinus rhythm  Normal ECG  When compared with ECG " of 2021 03:45,  No significant change was found  Confirmed by - EMERGENCY ROOM, PHYSICIAN ([id number]),  AMAYA CANALES ([id number]) on 2023 6:52:50 AM         Imaging:  No orders to display        Report per radiology unless otherwise specified in report or noted in MDM    Laboratory:  Labs Ordered and Resulted from Time of ED Arrival to Time of ED Departure - No data to display     Procedures       Emergency Department Course & Assessments:             Interventions:  Medications - No data to display     Independent Interpretation (X-rays, CTs, rhythm strip):  See MDM    Consultations/Discussion of Management or Tests:  None    Social Determinants of Health affecting care:  See MDM    Disposition:  The patient was discharged to home.     Impression & Plan    CMS Diagnoses: None    Medical Decision Makin year old male presenting w/ anterior-inferior chest pain/left upper quadrant abdominal pain status post coughing    Social determinants affecting patient's health include:  no significant social determinants negatively affecting the patient's health     I reviewed medical records from ED visit on 2023 for DKA, hospital admission for acute pancreatitis on 2023    DDx includes viral syndrome NOS, influenza-like illness, influenza, COVID-19, pancreatitis, hepatitis, abdominal wall muscle strain, intercostal muscle strain, costochondritis, rib fracture, pneumothorax.  Doubt PE, ACS, CHF given constellation of symptoms, history and physical exam.  Labs significant for elevated lactic acid level likely secondary to dehydration and potential work of breathing, COVID-19 negative, elevated LFTs.  Imaging sig for fatty infiltration of liver otherwise unremarkable.  Patient's abdominal tenderness is now overlying his right upper quadrant.  I inspected this is likely a viral hepatitis but the patient should have his LFTs rechecked with his PCP.  He was given IV fluids and his lactic acid level improved.   Medications given as noted above.  Doubt intra-abdominal abscess, hollow viscus rupture, ischemic bowel given symptoms started with coughing, focal tenderness without rebound or guarding.  Seems most consistent with abdominal wall muscle strain onset while coughing.  Given reassuring work-up, At this time I feel the pt is safe for discharge with prescriptions written as noted below for outpatient symptom control.  Recommendations given regarding follow up with PCP and return to the emergency department as needed for new or worsening symptoms.  Patient counseled on all results, disposition and diagnosis.  They are understanding and agreeable to plan. Patient discharged in stable condition.          Diagnosis:  No diagnosis found.     Discharge Medications:  New Prescriptions    No medications on file        10/28/2023   Will Poon MD Vaughn, Christopher E, MD  10/29/23 0229

## 2023-12-23 ENCOUNTER — HOSPITAL ENCOUNTER (INPATIENT)
Facility: CLINIC | Age: 43
LOS: 2 days | Discharge: HOME | DRG: 638 | End: 2023-12-25
Attending: EMERGENCY MEDICINE | Admitting: INTERNAL MEDICINE
Payer: COMMERCIAL

## 2023-12-23 ENCOUNTER — APPOINTMENT (OUTPATIENT)
Dept: ULTRASOUND IMAGING | Facility: CLINIC | Age: 43
DRG: 638 | End: 2023-12-23
Attending: EMERGENCY MEDICINE

## 2023-12-23 DIAGNOSIS — E11.10 DIABETIC KETOACIDOSIS WITHOUT COMA ASSOCIATED WITH TYPE 2 DIABETES MELLITUS (H): ICD-10-CM

## 2023-12-23 DIAGNOSIS — I10 UNCONTROLLED HYPERTENSION: ICD-10-CM

## 2023-12-23 DIAGNOSIS — K76.0 HEPATIC STEATOSIS: ICD-10-CM

## 2023-12-23 DIAGNOSIS — R79.89 KETONEMIA: ICD-10-CM

## 2023-12-23 DIAGNOSIS — R74.01 TRANSAMINITIS: ICD-10-CM

## 2023-12-23 DIAGNOSIS — M51.26 HERNIATED INTERVERTEBRAL DISC OF LUMBAR SPINE: Primary | ICD-10-CM

## 2023-12-23 LAB
ALBUMIN SERPL BCG-MCNC: 4.6 G/DL (ref 3.5–5.2)
ALBUMIN UR-MCNC: 30 MG/DL
ALP SERPL-CCNC: 107 U/L (ref 40–150)
ALT SERPL W P-5'-P-CCNC: 152 U/L (ref 0–70)
ALT SERPL W P-5'-P-CCNC: ABNORMAL U/L
ANION GAP SERPL CALCULATED.3IONS-SCNC: 19 MMOL/L (ref 7–15)
APPEARANCE UR: CLEAR
AST SERPL W P-5'-P-CCNC: 89 U/L (ref 0–45)
AST SERPL W P-5'-P-CCNC: ABNORMAL U/L
B-OH-BUTYR SERPL-SCNC: 2.2 MMOL/L
BASE EXCESS BLDV CALC-SCNC: -2.6 MMOL/L (ref -7.7–1.9)
BASOPHILS # BLD AUTO: 0 10E3/UL (ref 0–0.2)
BASOPHILS NFR BLD AUTO: 1 %
BILIRUB SERPL-MCNC: 0.4 MG/DL
BILIRUB UR QL STRIP: NEGATIVE
BUN SERPL-MCNC: 12.5 MG/DL (ref 6–20)
CALCIUM SERPL-MCNC: 9.1 MG/DL (ref 8.6–10)
CHLORIDE SERPL-SCNC: 94 MMOL/L (ref 98–107)
COLOR UR AUTO: ABNORMAL
CREAT SERPL-MCNC: 0.84 MG/DL (ref 0.67–1.17)
DEPRECATED HCO3 PLAS-SCNC: 17 MMOL/L (ref 22–29)
EGFRCR SERPLBLD CKD-EPI 2021: >90 ML/MIN/1.73M2
EOSINOPHIL # BLD AUTO: 0.1 10E3/UL (ref 0–0.7)
EOSINOPHIL NFR BLD AUTO: 1 %
ERYTHROCYTE [DISTWIDTH] IN BLOOD BY AUTOMATED COUNT: 13.2 % (ref 10–15)
GLUCOSE BLDC GLUCOMTR-MCNC: 184 MG/DL (ref 70–99)
GLUCOSE BLDC GLUCOMTR-MCNC: 190 MG/DL (ref 70–99)
GLUCOSE BLDC GLUCOMTR-MCNC: 210 MG/DL (ref 70–99)
GLUCOSE BLDC GLUCOMTR-MCNC: 303 MG/DL (ref 70–99)
GLUCOSE BLDC GLUCOMTR-MCNC: 320 MG/DL (ref 70–99)
GLUCOSE BLDC GLUCOMTR-MCNC: 340 MG/DL (ref 70–99)
GLUCOSE BLDC GLUCOMTR-MCNC: 355 MG/DL (ref 70–99)
GLUCOSE SERPL-MCNC: 281 MG/DL (ref 70–99)
GLUCOSE UR STRIP-MCNC: >=1000 MG/DL
HBA1C MFR BLD: 9.6 %
HCO3 BLDV-SCNC: 23 MMOL/L (ref 21–28)
HCT VFR BLD AUTO: 42.4 % (ref 40–53)
HGB BLD-MCNC: 15 G/DL (ref 13.3–17.7)
HGB UR QL STRIP: NEGATIVE
HOLD SPECIMEN: NORMAL
IMM GRANULOCYTES # BLD: 0 10E3/UL
IMM GRANULOCYTES NFR BLD: 1 %
KETONES UR STRIP-MCNC: 100 MG/DL
LEUKOCYTE ESTERASE UR QL STRIP: NEGATIVE
LIPASE SERPL-CCNC: 40 U/L (ref 13–60)
LYMPHOCYTES # BLD AUTO: 1.5 10E3/UL (ref 0.8–5.3)
LYMPHOCYTES NFR BLD AUTO: 26 %
MCH RBC QN AUTO: 27.1 PG (ref 26.5–33)
MCHC RBC AUTO-ENTMCNC: 35.4 G/DL (ref 31.5–36.5)
MCV RBC AUTO: 77 FL (ref 78–100)
MONOCYTES # BLD AUTO: 0.4 10E3/UL (ref 0–1.3)
MONOCYTES NFR BLD AUTO: 7 %
MUCOUS THREADS #/AREA URNS LPF: PRESENT /LPF
NEUTROPHILS # BLD AUTO: 3.6 10E3/UL (ref 1.6–8.3)
NEUTROPHILS NFR BLD AUTO: 64 %
NITRATE UR QL: NEGATIVE
NRBC # BLD AUTO: 0 10E3/UL
NRBC BLD AUTO-RTO: 0 /100
O2/TOTAL GAS SETTING VFR VENT: 98 %
PCO2 BLDV: 39 MM HG (ref 40–50)
PH BLDV: 7.37 [PH] (ref 7.32–7.43)
PH UR STRIP: 6 [PH] (ref 5–7)
PLATELET # BLD AUTO: 282 10E3/UL (ref 150–450)
PO2 BLDV: 46 MM HG (ref 25–47)
POTASSIUM SERPL-SCNC: 4.5 MMOL/L (ref 3.4–5.3)
PROT SERPL-MCNC: 7.4 G/DL (ref 6.4–8.3)
RBC # BLD AUTO: 5.54 10E6/UL (ref 4.4–5.9)
RBC URINE: 0 /HPF
SODIUM SERPL-SCNC: 130 MMOL/L (ref 135–145)
SP GR UR STRIP: 1.02 (ref 1–1.03)
TROPONIN T SERPL HS-MCNC: 13 NG/L
UROBILINOGEN UR STRIP-MCNC: NORMAL MG/DL
WBC # BLD AUTO: 5.5 10E3/UL (ref 4–11)
WBC URINE: <1 /HPF

## 2023-12-23 PROCEDURE — 82803 BLOOD GASES ANY COMBINATION: CPT | Performed by: EMERGENCY MEDICINE

## 2023-12-23 PROCEDURE — 250N000013 HC RX MED GY IP 250 OP 250 PS 637: Performed by: INTERNAL MEDICINE

## 2023-12-23 PROCEDURE — 82962 GLUCOSE BLOOD TEST: CPT

## 2023-12-23 PROCEDURE — 83690 ASSAY OF LIPASE: CPT | Performed by: EMERGENCY MEDICINE

## 2023-12-23 PROCEDURE — 84155 ASSAY OF PROTEIN SERUM: CPT | Performed by: EMERGENCY MEDICINE

## 2023-12-23 PROCEDURE — 96360 HYDRATION IV INFUSION INIT: CPT

## 2023-12-23 PROCEDURE — 99285 EMERGENCY DEPT VISIT HI MDM: CPT | Mod: 25

## 2023-12-23 PROCEDURE — 84484 ASSAY OF TROPONIN QUANT: CPT | Performed by: EMERGENCY MEDICINE

## 2023-12-23 PROCEDURE — 250N000011 HC RX IP 250 OP 636: Performed by: INTERNAL MEDICINE

## 2023-12-23 PROCEDURE — 99223 1ST HOSP IP/OBS HIGH 75: CPT | Performed by: INTERNAL MEDICINE

## 2023-12-23 PROCEDURE — 250N000013 HC RX MED GY IP 250 OP 250 PS 637: Performed by: EMERGENCY MEDICINE

## 2023-12-23 PROCEDURE — 83036 HEMOGLOBIN GLYCOSYLATED A1C: CPT | Performed by: EMERGENCY MEDICINE

## 2023-12-23 PROCEDURE — 250N000009 HC RX 250: Performed by: EMERGENCY MEDICINE

## 2023-12-23 PROCEDURE — 82040 ASSAY OF SERUM ALBUMIN: CPT | Performed by: EMERGENCY MEDICINE

## 2023-12-23 PROCEDURE — 81001 URINALYSIS AUTO W/SCOPE: CPT | Performed by: EMERGENCY MEDICINE

## 2023-12-23 PROCEDURE — 93005 ELECTROCARDIOGRAM TRACING: CPT

## 2023-12-23 PROCEDURE — 85025 COMPLETE CBC W/AUTO DIFF WBC: CPT | Performed by: EMERGENCY MEDICINE

## 2023-12-23 PROCEDURE — 82010 KETONE BODYS QUAN: CPT | Performed by: EMERGENCY MEDICINE

## 2023-12-23 PROCEDURE — 120N000013 HC R&B IMCU

## 2023-12-23 PROCEDURE — 36415 COLL VENOUS BLD VENIPUNCTURE: CPT | Performed by: EMERGENCY MEDICINE

## 2023-12-23 PROCEDURE — 258N000003 HC RX IP 258 OP 636: Performed by: EMERGENCY MEDICINE

## 2023-12-23 PROCEDURE — 76705 ECHO EXAM OF ABDOMEN: CPT

## 2023-12-23 RX ORDER — DEXTROSE MONOHYDRATE 25 G/50ML
25-50 INJECTION, SOLUTION INTRAVENOUS
Status: DISCONTINUED | OUTPATIENT
Start: 2023-12-23 | End: 2023-12-23

## 2023-12-23 RX ORDER — ONDANSETRON 4 MG/1
4 TABLET, ORALLY DISINTEGRATING ORAL EVERY 6 HOURS PRN
Status: DISCONTINUED | OUTPATIENT
Start: 2023-12-23 | End: 2023-12-25 | Stop reason: HOSPADM

## 2023-12-23 RX ORDER — ONDANSETRON 2 MG/ML
4 INJECTION INTRAMUSCULAR; INTRAVENOUS EVERY 6 HOURS PRN
Status: DISCONTINUED | OUTPATIENT
Start: 2023-12-23 | End: 2023-12-25 | Stop reason: HOSPADM

## 2023-12-23 RX ORDER — GUAIFENESIN 200 MG/10ML
200 LIQUID ORAL EVERY 4 HOURS PRN
Status: DISCONTINUED | OUTPATIENT
Start: 2023-12-23 | End: 2023-12-25 | Stop reason: HOSPADM

## 2023-12-23 RX ORDER — HYDRALAZINE HYDROCHLORIDE 10 MG/1
10 TABLET, FILM COATED ORAL EVERY 4 HOURS PRN
Status: DISCONTINUED | OUTPATIENT
Start: 2023-12-23 | End: 2023-12-25 | Stop reason: HOSPADM

## 2023-12-23 RX ORDER — LIDOCAINE 40 MG/G
CREAM TOPICAL
Status: DISCONTINUED | OUTPATIENT
Start: 2023-12-23 | End: 2023-12-25 | Stop reason: HOSPADM

## 2023-12-23 RX ORDER — ACETAMINOPHEN 650 MG/1
650 SUPPOSITORY RECTAL EVERY 4 HOURS PRN
Status: DISCONTINUED | OUTPATIENT
Start: 2023-12-23 | End: 2023-12-25

## 2023-12-23 RX ORDER — HYDRALAZINE HYDROCHLORIDE 20 MG/ML
10 INJECTION INTRAMUSCULAR; INTRAVENOUS EVERY 4 HOURS PRN
Status: DISCONTINUED | OUTPATIENT
Start: 2023-12-23 | End: 2023-12-25 | Stop reason: HOSPADM

## 2023-12-23 RX ORDER — NICOTINE POLACRILEX 4 MG
15-30 LOZENGE BUCCAL
Status: DISCONTINUED | OUTPATIENT
Start: 2023-12-23 | End: 2023-12-25 | Stop reason: HOSPADM

## 2023-12-23 RX ORDER — BISACODYL 10 MG
10 SUPPOSITORY, RECTAL RECTAL DAILY PRN
Status: DISCONTINUED | OUTPATIENT
Start: 2023-12-23 | End: 2023-12-25 | Stop reason: HOSPADM

## 2023-12-23 RX ORDER — DEXTROSE MONOHYDRATE, SODIUM CHLORIDE, AND POTASSIUM CHLORIDE 50; 1.49; 4.5 G/1000ML; G/1000ML; G/1000ML
INJECTION, SOLUTION INTRAVENOUS CONTINUOUS
Status: DISCONTINUED | OUTPATIENT
Start: 2023-12-23 | End: 2023-12-25 | Stop reason: HOSPADM

## 2023-12-23 RX ORDER — AMOXICILLIN 250 MG
1 CAPSULE ORAL 2 TIMES DAILY PRN
Status: DISCONTINUED | OUTPATIENT
Start: 2023-12-23 | End: 2023-12-25 | Stop reason: HOSPADM

## 2023-12-23 RX ORDER — ACETAMINOPHEN 325 MG/1
650 TABLET ORAL EVERY 4 HOURS PRN
Status: DISCONTINUED | OUTPATIENT
Start: 2023-12-23 | End: 2023-12-25

## 2023-12-23 RX ORDER — ACETAMINOPHEN 500 MG
1000 TABLET ORAL ONCE
Status: COMPLETED | OUTPATIENT
Start: 2023-12-23 | End: 2023-12-23

## 2023-12-23 RX ORDER — HYDROMORPHONE HYDROCHLORIDE 2 MG/1
2 TABLET ORAL EVERY 4 HOURS PRN
Status: DISCONTINUED | OUTPATIENT
Start: 2023-12-23 | End: 2023-12-25

## 2023-12-23 RX ORDER — SODIUM CHLORIDE AND POTASSIUM CHLORIDE 150; 900 MG/100ML; MG/100ML
INJECTION, SOLUTION INTRAVENOUS CONTINUOUS
Status: DISCONTINUED | OUTPATIENT
Start: 2023-12-23 | End: 2023-12-25 | Stop reason: HOSPADM

## 2023-12-23 RX ORDER — CALCIUM CARBONATE 500 MG/1
1000 TABLET, CHEWABLE ORAL 4 TIMES DAILY PRN
Status: DISCONTINUED | OUTPATIENT
Start: 2023-12-23 | End: 2023-12-25 | Stop reason: HOSPADM

## 2023-12-23 RX ORDER — AMOXICILLIN 250 MG
2 CAPSULE ORAL 2 TIMES DAILY PRN
Status: DISCONTINUED | OUTPATIENT
Start: 2023-12-23 | End: 2023-12-25 | Stop reason: HOSPADM

## 2023-12-23 RX ORDER — NITROGLYCERIN 0.4 MG/1
0.4 TABLET SUBLINGUAL EVERY 5 MIN PRN
Status: DISCONTINUED | OUTPATIENT
Start: 2023-12-23 | End: 2023-12-25 | Stop reason: HOSPADM

## 2023-12-23 RX ORDER — DEXTROSE MONOHYDRATE 25 G/50ML
25-50 INJECTION, SOLUTION INTRAVENOUS
Status: DISCONTINUED | OUTPATIENT
Start: 2023-12-23 | End: 2023-12-25 | Stop reason: HOSPADM

## 2023-12-23 RX ADMIN — ACETAMINOPHEN 650 MG: 325 TABLET, FILM COATED ORAL at 22:50

## 2023-12-23 RX ADMIN — SODIUM CHLORIDE 1000 ML: 9 INJECTION, SOLUTION INTRAVENOUS at 17:59

## 2023-12-23 RX ADMIN — POTASSIUM CHLORIDE AND SODIUM CHLORIDE: 900; 150 INJECTION, SOLUTION INTRAVENOUS at 22:47

## 2023-12-23 RX ADMIN — INSULIN HUMAN 1.5 UNITS/HR: 1 INJECTION, SOLUTION INTRAVENOUS at 21:20

## 2023-12-23 RX ADMIN — ACETAMINOPHEN 1000 MG: 500 TABLET, FILM COATED ORAL at 17:59

## 2023-12-23 RX ADMIN — ONDANSETRON 4 MG: 4 TABLET, ORALLY DISINTEGRATING ORAL at 22:50

## 2023-12-23 ASSESSMENT — ACTIVITIES OF DAILY LIVING (ADL)
ADLS_ACUITY_SCORE: 35

## 2023-12-23 NOTE — ED PROVIDER NOTES
"  History     Chief Complaint:  Hyperglycemia       The history is provided by the patient.      Josselyn Brewster is a 43 year old male with a history of type 2 diabetes mellitus on NovoLog and Lantus who presents with hyperglycemia.  Over the last few days he has noticed his insulin is \"not working\".  He gives an example of glucose 397 this morning for which he gave himself 12 units of NovoLog.  He then ate breakfast of salad with popcorn chicken and his glucose was 400 around noon.  He then ate leftover pizza for lunch.  He describes polydipsia, dizziness, and fatigue in the last several days.  He has had no change to his medication doses recently.  He has noticed some right-sided flank pain as well as chest pain but thinks those are just \"from diabetes.\"  He denies fever, cough, vomiting, dysuria, and hematuria.    Independent Historian:   As indicated above.    Review of External Notes:   Nurse triage call 12/20/23 for high blood glucose, frequent urination, weakness, fatigue, back pain.    Medications:    Celexa  Lopid  Novolog  Lantus  Norvasc   Cozaar  Lioresal  Revatio    Past Medical History:    Pancreatitis   Type 2 diabetes mellitus   Hypertension   Hyperlipidemia   Cocaine dependence  Osteoarthritis   Opiate use disorder  Depression     Past Surgical History:    Lumbar epidural steroid injection  Laminectomy  Hernia repair  No prior appendectomy or cholecystectomy    Physical Exam   Patient Vitals for the past 24 hrs:   BP Temp Temp src Pulse Resp SpO2   12/23/23 2200 (!) 164/105 -- -- 95 -- --   12/23/23 2130 (!) 188/127 -- -- 88 -- --   12/23/23 2100 (!) 175/117 -- -- 89 -- --   12/23/23 1936 -- -- -- -- -- 96 %   12/23/23 1930 (!) 175/120 -- -- 83 -- 98 %   12/23/23 1927 (!) 184/122 -- -- 96 -- --   12/23/23 1801 (!) 160/113 -- -- 92 -- 93 %   12/23/23 1730 (!) 170/123 -- -- 106 -- 96 %   12/23/23 1657 (!) 177/142 97.9  F (36.6  C) Oral 112 24 98 %        Physical Exam  General: Well-developed and " well-nourished. Well appearing middle aged man. Cooperative.  Head:  Atraumatic.  Eyes:  Conjunctivae, lids, and sclerae are normal.  ENT:    Normal nose. Moist mucous membranes.  Neck:  Supple. Normal range of motion.  CV:  Tachycardic rate and regular rhythm. Normal heart sounds with no murmurs, rubs, or gallops detected.  Resp:  No respiratory distress. Clear to auscultation bilaterally without decreased breath sounds, wheezing, rales, or rhonchi.  GI:  Soft. Non-distended.  Right upper quadrant tenderness with positive Chang's sign.  No right lower quadrant tenderness.  No CVA tenderness.   MS:  Normal ROM.   Skin:  Warm. Non-diaphoretic. No pallor.  Neuro:  Awake. A&Ox3. Normal strength.  Psych: Normal mood and affect. Normal speech.  Vitals reviewed.    Emergency Department Course   EKG  Indication: Hyperglycemia and chest pain  Time: 1850  Rate 87 bpm. AK interval 168. QRS duration 108. QT/QTc 390/469.   Normal sinus rhythm. Normal ECG   No acute ST changes.  No significant change as compared to prior, dated 06/28/23.     Imaging:  US Abdomen Limited   Final Result   IMPRESSION:   1.  Hepatic steatosis.   2.  Normal gallbladder and bile ducts. No cholelithiasis and no sonographic evidence for acute cholecystitis.               Report per radiology    Laboratory:  Labs Ordered and Resulted from Time of ED Arrival to Time of ED Departure   GLUCOSE BY METER - Abnormal       Result Value    GLUCOSE BY METER POCT 320 (*)    COMPREHENSIVE METABOLIC PANEL - Abnormal    Sodium 130 (*)     Potassium 4.5      Carbon Dioxide (CO2) 17 (*)     Anion Gap 19 (*)     Urea Nitrogen 12.5      Creatinine 0.84      GFR Estimate >90      Calcium 9.1      Chloride 94 (*)     Glucose 281 (*)     Alkaline Phosphatase 107      AST        ALT        Protein Total 7.4      Albumin 4.6      Bilirubin Total 0.4     ROUTINE UA WITH MICROSCOPIC REFLEX TO CULTURE - Abnormal    Color Urine Light Yellow      Appearance Urine Clear       Glucose Urine >=1000 (*)     Bilirubin Urine Negative      Ketones Urine 100 (*)     Specific Gravity Urine 1.025      Blood Urine Negative      pH Urine 6.0      Protein Albumin Urine 30 (*)     Urobilinogen Urine Normal      Nitrite Urine Negative      Leukocyte Esterase Urine Negative      Mucus Urine Present (*)     RBC Urine 0      WBC Urine <1     CBC WITH PLATELETS AND DIFFERENTIAL - Abnormal    WBC Count 5.5      RBC Count 5.54      Hemoglobin 15.0      Hematocrit 42.4      MCV 77 (*)     MCH 27.1      MCHC 35.4      RDW 13.2      Platelet Count 282      % Neutrophils 64      % Lymphocytes 26      % Monocytes 7      % Eosinophils 1      % Basophils 1      % Immature Granulocytes 1      NRBCs per 100 WBC 0      Absolute Neutrophils 3.6      Absolute Lymphocytes 1.5      Absolute Monocytes 0.4      Absolute Eosinophils 0.1      Absolute Basophils 0.0      Absolute Immature Granulocytes 0.0      Absolute NRBCs 0.0     AST - Abnormal    AST 89 (*)    ALT - Abnormal     (*)    KETONE BETA-HYDROXYBUTYRATE QUANTITATIVE, RAPID - Abnormal    Ketone (Beta-Hydroxybutyrate) Quantitative 2.20 (*)    GLUCOSE BY METER - Abnormal    GLUCOSE BY METER POCT 190 (*)    HEMOGLOBIN A1C - Abnormal    Hemoglobin A1C 9.6 (*)    GLUCOSE BY METER - Abnormal    GLUCOSE BY METER POCT 184 (*)    GLUCOSE BY METER - Abnormal    GLUCOSE BY METER POCT 210 (*)    LIPASE - Normal    Lipase 40     TROPONIN T, HIGH SENSITIVITY - Normal    Troponin T, High Sensitivity 13     BLOOD GAS VENOUS      Emergency Department Course & Assessments:  Interventions:  Medications   sodium chloride 0.9% BOLUS 2,000 mL (2,000 mLs Intravenous Stopped 12/23/23 1904)   acetaminophen (TYLENOL) tablet 1,000 mg (1,000 mg Oral $Given 12/23/23 1759)   Insulin infusion initiated at 2100    Independent Interpretation (X-rays, CTs, rhythm strip):  None    Assessments/Consultations/Discussion of Management or Tests:  ED Course as of 12/23/23 2106   Sat Dec 23, 2023  "  1750 I obtained history and examined the patient as noted above.     2013 I rechecked and updated the patient.    2023 I rechecked and updated the patient.     2106 I consulted with Dr. Carcamo, hospitalist team, regarding the patient. They accepted the patient for admission.        Social Determinants of Health affecting care:   Established with primary care    Disposition:  The patient was admitted to the hospital under the care of Dr. Carcamo.     Impression & Plan    Medical Decision Making:  Josselyn is a 43 year old man with type 2 diabetes who presents with hyperglycemia that is particularly concerning to him as he has been compliant with insulin.  He does describe his meals today which are not low-carb.  He describes some right sided flank or abdominal pain but denies any other symptoms.  He appears well on exam with tachycardia that resolved with IV fluids as well as a persistent moderate hypertension.  He has right upper quadrant tenderness with positive Chang sign but no CVA tenderness.    He was sent for right upper quadrant ultrasound which, fortunately does not reveal cholecystitis with hepatic steatosis only.  There is associated mild transaminitis with AST of 89 (prior value 116) and ALT of 152 (prior value 131).  There is no evidence of pancreatitis.  Most notably, laboratory studies are significant for DKA with glucose of 281, anion gap 19, and bicarb of 17.  He has ketonemia of 2.2.  Insulin infusion was initiated.  He was given 2 L IV fluid.  He has ill-defined chest pain that he attributes \"to diabetes\".  Troponin is normal and EKG is reassuring without acute ST changes or arrhythmias.  Laboratory studies are otherwise admission for pseudohyponatremia 130, lack of UTI, lack of leukocytosis, and lack of anemia.  Hemoglobin A1c is 9.6%.    I updated the patient on findings and plan for admission.  He verbalized understanding and is amenable.  I discussed his case with Dr. Carcamo, " hospitalist, who accepts admission and has no orders.    Diagnosis:    ICD-10-CM    1. Diabetic ketoacidosis without coma associated with type 2 diabetes mellitus (H)  E11.10       2. Ketonemia  R79.89       3. Uncontrolled hypertension  I10       4. Hepatic steatosis  K76.0       5. Transaminitis  R74.01            Scribe Disclosure:  I, Barbara Willson, am serving as a scribe  for Junedalton Kingvargas at 5:25 PM on 12/23/2023 to document services personally performed by Gabby Luis MD based on my observations and the provider's statements to me.    Scribe Disclosure:  I, June Gayle, am serving as a scribe at 8:14 PM on 12/23/2023 to document services personally performed by Gabby Luis MD based on my observations and the provider's statements to me.     12/23/2023   Gabby Luis MD Dixson, Kylie S, MD  12/23/23 9722

## 2023-12-23 NOTE — ED TRIAGE NOTES
Pt presents to the ED with complaint of high blood sugar at home in the 400s. Pt states his insulin isn't working right and not lowering his BG after he eats. He has been taking both his fast and long acting insulin. Pt states he's been increasingly thirsty, dizzy, and lethargic. Pt states he has pain 6/10 in his right flank and chest. BG in triage 320.     Triage Assessment (Adult)       Row Name 12/23/23 1701          Triage Assessment    Airway WDL WDL        Respiratory WDL    Respiratory WDL WDL        Skin Circulation/Temperature WDL    Skin Circulation/Temperature WDL WDL        Cardiac WDL    Cardiac WDL X  HTN        Peripheral/Neurovascular WDL    Peripheral Neurovascular WDL WDL        Cognitive/Neuro/Behavioral WDL    Cognitive/Neuro/Behavioral WDL WDL

## 2023-12-24 ENCOUNTER — APPOINTMENT (OUTPATIENT)
Dept: GENERAL RADIOLOGY | Facility: CLINIC | Age: 43
DRG: 638 | End: 2023-12-24
Attending: INTERNAL MEDICINE

## 2023-12-24 LAB
ALBUMIN SERPL BCG-MCNC: 3.9 G/DL (ref 3.5–5.2)
ALP SERPL-CCNC: 66 U/L (ref 40–150)
ALT SERPL W P-5'-P-CCNC: 132 U/L (ref 0–70)
ANION GAP SERPL CALCULATED.3IONS-SCNC: 12 MMOL/L (ref 7–15)
ANION GAP SERPL CALCULATED.3IONS-SCNC: 20 MMOL/L (ref 7–15)
AST SERPL W P-5'-P-CCNC: 76 U/L (ref 0–45)
ATRIAL RATE - MUSE: 87 BPM
B-OH-BUTYR SERPL-SCNC: 0.5 MMOL/L
BILIRUB SERPL-MCNC: 0.4 MG/DL
BUN SERPL-MCNC: 10.2 MG/DL (ref 6–20)
BUN SERPL-MCNC: 11 MG/DL (ref 6–20)
BUN SERPL-MCNC: 11 MG/DL (ref 6–20)
BUN SERPL-MCNC: 11.9 MG/DL (ref 6–20)
CALCIUM SERPL-MCNC: 8.2 MG/DL (ref 8.6–10)
CALCIUM SERPL-MCNC: 8.2 MG/DL (ref 8.6–10)
CALCIUM SERPL-MCNC: 8.3 MG/DL (ref 8.6–10)
CALCIUM SERPL-MCNC: 8.7 MG/DL (ref 8.6–10)
CHLORIDE SERPL-SCNC: 101 MMOL/L (ref 98–107)
CHLORIDE SERPL-SCNC: 97 MMOL/L (ref 98–107)
CREAT SERPL-MCNC: 0.72 MG/DL (ref 0.67–1.17)
CREAT SERPL-MCNC: 0.75 MG/DL (ref 0.67–1.17)
CREAT SERPL-MCNC: 0.75 MG/DL (ref 0.67–1.17)
CREAT SERPL-MCNC: 0.81 MG/DL (ref 0.67–1.17)
DEPRECATED HCO3 PLAS-SCNC: 14 MMOL/L (ref 22–29)
DEPRECATED HCO3 PLAS-SCNC: 20 MMOL/L (ref 22–29)
DIASTOLIC BLOOD PRESSURE - MUSE: NORMAL MMHG
EGFRCR SERPLBLD CKD-EPI 2021: >90 ML/MIN/1.73M2
ERYTHROCYTE [DISTWIDTH] IN BLOOD BY AUTOMATED COUNT: 13.3 % (ref 10–15)
FLUAV RNA SPEC QL NAA+PROBE: NEGATIVE
FLUBV RNA RESP QL NAA+PROBE: NEGATIVE
GLUCOSE BLDC GLUCOMTR-MCNC: 116 MG/DL (ref 70–99)
GLUCOSE BLDC GLUCOMTR-MCNC: 137 MG/DL (ref 70–99)
GLUCOSE BLDC GLUCOMTR-MCNC: 141 MG/DL (ref 70–99)
GLUCOSE BLDC GLUCOMTR-MCNC: 141 MG/DL (ref 70–99)
GLUCOSE BLDC GLUCOMTR-MCNC: 142 MG/DL (ref 70–99)
GLUCOSE BLDC GLUCOMTR-MCNC: 150 MG/DL (ref 70–99)
GLUCOSE BLDC GLUCOMTR-MCNC: 152 MG/DL (ref 70–99)
GLUCOSE BLDC GLUCOMTR-MCNC: 167 MG/DL (ref 70–99)
GLUCOSE BLDC GLUCOMTR-MCNC: 167 MG/DL (ref 70–99)
GLUCOSE BLDC GLUCOMTR-MCNC: 183 MG/DL (ref 70–99)
GLUCOSE BLDC GLUCOMTR-MCNC: 192 MG/DL (ref 70–99)
GLUCOSE BLDC GLUCOMTR-MCNC: 201 MG/DL (ref 70–99)
GLUCOSE BLDC GLUCOMTR-MCNC: 213 MG/DL (ref 70–99)
GLUCOSE BLDC GLUCOMTR-MCNC: 220 MG/DL (ref 70–99)
GLUCOSE BLDC GLUCOMTR-MCNC: 220 MG/DL (ref 70–99)
GLUCOSE BLDC GLUCOMTR-MCNC: 233 MG/DL (ref 70–99)
GLUCOSE BLDC GLUCOMTR-MCNC: 253 MG/DL (ref 70–99)
GLUCOSE BLDC GLUCOMTR-MCNC: 264 MG/DL (ref 70–99)
GLUCOSE BLDC GLUCOMTR-MCNC: 284 MG/DL (ref 70–99)
GLUCOSE SERPL-MCNC: 135 MG/DL (ref 70–99)
GLUCOSE SERPL-MCNC: 135 MG/DL (ref 70–99)
GLUCOSE SERPL-MCNC: 164 MG/DL (ref 70–99)
GLUCOSE SERPL-MCNC: 177 MG/DL (ref 70–99)
HBA1C MFR BLD: 9.8 %
HCT VFR BLD AUTO: 35.2 % (ref 40–53)
HGB BLD-MCNC: 12.1 G/DL (ref 13.3–17.7)
INTERPRETATION ECG - MUSE: NORMAL
MAGNESIUM SERPL-MCNC: 1.8 MG/DL (ref 1.7–2.3)
MAGNESIUM SERPL-MCNC: 1.8 MG/DL (ref 1.7–2.3)
MCH RBC QN AUTO: 26.5 PG (ref 26.5–33)
MCHC RBC AUTO-ENTMCNC: 34.4 G/DL (ref 31.5–36.5)
MCV RBC AUTO: 77 FL (ref 78–100)
P AXIS - MUSE: 43 DEGREES
PLATELET # BLD AUTO: 216 10E3/UL (ref 150–450)
POTASSIUM SERPL-SCNC: 3.5 MMOL/L (ref 3.4–5.3)
POTASSIUM SERPL-SCNC: 3.5 MMOL/L (ref 3.4–5.3)
POTASSIUM SERPL-SCNC: 3.7 MMOL/L (ref 3.4–5.3)
POTASSIUM SERPL-SCNC: 4.1 MMOL/L (ref 3.4–5.3)
PR INTERVAL - MUSE: 168 MS
PROT SERPL-MCNC: 6.1 G/DL (ref 6.4–8.3)
QRS DURATION - MUSE: 108 MS
QT - MUSE: 390 MS
QTC - MUSE: 469 MS
R AXIS - MUSE: 38 DEGREES
RBC # BLD AUTO: 4.56 10E6/UL (ref 4.4–5.9)
RSV RNA SPEC NAA+PROBE: NEGATIVE
SARS-COV-2 RNA RESP QL NAA+PROBE: NEGATIVE
SODIUM SERPL-SCNC: 131 MMOL/L (ref 135–145)
SODIUM SERPL-SCNC: 133 MMOL/L (ref 135–145)
SYSTOLIC BLOOD PRESSURE - MUSE: NORMAL MMHG
T AXIS - MUSE: 37 DEGREES
VENTRICULAR RATE- MUSE: 87 BPM
WBC # BLD AUTO: 4.9 10E3/UL (ref 4–11)

## 2023-12-24 PROCEDURE — 80048 BASIC METABOLIC PNL TOTAL CA: CPT | Performed by: INTERNAL MEDICINE

## 2023-12-24 PROCEDURE — 87637 SARSCOV2&INF A&B&RSV AMP PRB: CPT | Performed by: INTERNAL MEDICINE

## 2023-12-24 PROCEDURE — 250N000009 HC RX 250: Performed by: INTERNAL MEDICINE

## 2023-12-24 PROCEDURE — 83735 ASSAY OF MAGNESIUM: CPT | Performed by: INTERNAL MEDICINE

## 2023-12-24 PROCEDURE — 99233 SBSQ HOSP IP/OBS HIGH 50: CPT | Performed by: INTERNAL MEDICINE

## 2023-12-24 PROCEDURE — 250N000012 HC RX MED GY IP 250 OP 636 PS 637: Performed by: INTERNAL MEDICINE

## 2023-12-24 PROCEDURE — 120N000013 HC R&B IMCU

## 2023-12-24 PROCEDURE — 87040 BLOOD CULTURE FOR BACTERIA: CPT | Performed by: INTERNAL MEDICINE

## 2023-12-24 PROCEDURE — 83036 HEMOGLOBIN GLYCOSYLATED A1C: CPT | Performed by: INTERNAL MEDICINE

## 2023-12-24 PROCEDURE — 250N000013 HC RX MED GY IP 250 OP 250 PS 637: Performed by: INTERNAL MEDICINE

## 2023-12-24 PROCEDURE — 36415 COLL VENOUS BLD VENIPUNCTURE: CPT | Performed by: INTERNAL MEDICINE

## 2023-12-24 PROCEDURE — 82010 KETONE BODYS QUAN: CPT | Performed by: INTERNAL MEDICINE

## 2023-12-24 PROCEDURE — 258N000003 HC RX IP 258 OP 636: Performed by: INTERNAL MEDICINE

## 2023-12-24 PROCEDURE — 85027 COMPLETE CBC AUTOMATED: CPT | Performed by: INTERNAL MEDICINE

## 2023-12-24 PROCEDURE — 250N000011 HC RX IP 250 OP 636: Performed by: INTERNAL MEDICINE

## 2023-12-24 PROCEDURE — 71046 X-RAY EXAM CHEST 2 VIEWS: CPT

## 2023-12-24 RX ORDER — NALOXONE HYDROCHLORIDE 0.4 MG/ML
0.4 INJECTION, SOLUTION INTRAMUSCULAR; INTRAVENOUS; SUBCUTANEOUS
Status: DISCONTINUED | OUTPATIENT
Start: 2023-12-24 | End: 2023-12-25 | Stop reason: HOSPADM

## 2023-12-24 RX ORDER — NALOXONE HYDROCHLORIDE 0.4 MG/ML
0.2 INJECTION, SOLUTION INTRAMUSCULAR; INTRAVENOUS; SUBCUTANEOUS
Status: DISCONTINUED | OUTPATIENT
Start: 2023-12-24 | End: 2023-12-25 | Stop reason: HOSPADM

## 2023-12-24 RX ORDER — LISINOPRIL 20 MG/1
20 TABLET ORAL DAILY
Status: DISCONTINUED | OUTPATIENT
Start: 2023-12-24 | End: 2023-12-25 | Stop reason: HOSPADM

## 2023-12-24 RX ADMIN — POTASSIUM CHLORIDE, DEXTROSE MONOHYDRATE AND SODIUM CHLORIDE: 150; 5; 450 INJECTION, SOLUTION INTRAVENOUS at 23:14

## 2023-12-24 RX ADMIN — LISINOPRIL 20 MG: 20 TABLET ORAL at 08:03

## 2023-12-24 RX ADMIN — HYDROMORPHONE HYDROCHLORIDE 2 MG: 2 TABLET ORAL at 23:20

## 2023-12-24 RX ADMIN — POTASSIUM CHLORIDE, DEXTROSE MONOHYDRATE AND SODIUM CHLORIDE: 150; 5; 450 INJECTION, SOLUTION INTRAVENOUS at 01:28

## 2023-12-24 RX ADMIN — POTASSIUM CHLORIDE, DEXTROSE MONOHYDRATE AND SODIUM CHLORIDE: 150; 5; 450 INJECTION, SOLUTION INTRAVENOUS at 08:45

## 2023-12-24 RX ADMIN — HYDROMORPHONE HYDROCHLORIDE 2 MG: 2 TABLET ORAL at 02:23

## 2023-12-24 RX ADMIN — ACETAMINOPHEN 650 MG: 325 TABLET, FILM COATED ORAL at 05:48

## 2023-12-24 RX ADMIN — ONDANSETRON 4 MG: 4 TABLET, ORALLY DISINTEGRATING ORAL at 21:07

## 2023-12-24 RX ADMIN — ACETAMINOPHEN 650 MG: 325 TABLET, FILM COATED ORAL at 16:00

## 2023-12-24 RX ADMIN — HYDROMORPHONE HYDROCHLORIDE 2 MG: 2 TABLET ORAL at 18:48

## 2023-12-24 RX ADMIN — INSULIN ASPART 4 UNITS: 100 INJECTION, SOLUTION INTRAVENOUS; SUBCUTANEOUS at 16:46

## 2023-12-24 RX ADMIN — HYDROMORPHONE HYDROCHLORIDE 2 MG: 2 TABLET ORAL at 13:44

## 2023-12-24 RX ADMIN — INSULIN HUMAN: 1 INJECTION, SOLUTION INTRAVENOUS at 11:53

## 2023-12-24 RX ADMIN — INSULIN ASPART 4 UNITS: 100 INJECTION, SOLUTION INTRAVENOUS; SUBCUTANEOUS at 14:18

## 2023-12-24 RX ADMIN — HYDROMORPHONE HYDROCHLORIDE 2 MG: 2 TABLET ORAL at 08:03

## 2023-12-24 ASSESSMENT — ACTIVITIES OF DAILY LIVING (ADL)
ADLS_ACUITY_SCORE: 35
ADLS_ACUITY_SCORE: 18
ADLS_ACUITY_SCORE: 35
ADLS_ACUITY_SCORE: 18
ADLS_ACUITY_SCORE: 35
ADLS_ACUITY_SCORE: 18
ADLS_ACUITY_SCORE: 35
ADLS_ACUITY_SCORE: 18

## 2023-12-24 NOTE — PLAN OF CARE
Goal Outcome Evaluation:    Plan of Care Reviewed With: patient    Overall Patient Progress: improving    A&Ox4. Pain in back and flank, gave PO Dilaudid x2. SBA. Neuros unchanged. LS: clear. Tele: SR. MRI lumbar ordered. K and Mg protocol, recheck in AM. On insulin drip. D5 1/2 NS + K running at 125 mL/hr. Added Lisinopril for HTN. Continue POC.

## 2023-12-24 NOTE — ED NOTES
[facility]  ED Nurse Handoff Report    ED Chief complaint: Hyperglycemia  . ED Diagnosis:   Final diagnoses:   None       Allergies:   Allergies   Allergen Reactions    Levofloxacin Hives    Morphine Other (See Comments)     Other reaction(s): Urinary Retention  Other reaction(s): Urinary Retention       Code Status: Full Code    Activity level - Baseline/Home:  independent.  Activity Level - Current:   independent.   Lift room needed: No.   Bariatric: No   Needed: No   Isolation: No.   Infection: Not Applicable.     Respiratory status: Room air    Vital Signs (within 30 minutes):   Vitals:    12/23/23 1930 12/23/23 1936 12/23/23 2100 12/23/23 2130   BP: (!) 175/120  (!) 175/117 (!) 188/127   Pulse: 83  89 88   Resp:       Temp:       TempSrc:       SpO2: 98% 96%         Cardiac Rhythm:  ,      Pain level:    Patient confused: No.   Patient Falls Risk: patient and family education.   Elimination Status: Has voided     Patient Report - Initial Complaint: Hyperglycemia.   Focused Assessment: Resp, cards     Abnormal Results:   Labs Ordered and Resulted from Time of ED Arrival to Time of ED Departure   GLUCOSE BY METER - Abnormal       Result Value    GLUCOSE BY METER POCT 320 (*)    COMPREHENSIVE METABOLIC PANEL - Abnormal    Sodium 130 (*)     Potassium 4.5      Carbon Dioxide (CO2) 17 (*)     Anion Gap 19 (*)     Urea Nitrogen 12.5      Creatinine 0.84      GFR Estimate >90      Calcium 9.1      Chloride 94 (*)     Glucose 281 (*)     Alkaline Phosphatase 107      AST        ALT        Protein Total 7.4      Albumin 4.6      Bilirubin Total 0.4     ROUTINE UA WITH MICROSCOPIC REFLEX TO CULTURE - Abnormal    Color Urine Light Yellow      Appearance Urine Clear      Glucose Urine >=1000 (*)     Bilirubin Urine Negative      Ketones Urine 100 (*)     Specific Gravity Urine 1.025      Blood Urine Negative      pH Urine 6.0      Protein Albumin Urine 30 (*)     Urobilinogen Urine Normal       Nitrite Urine Negative      Leukocyte Esterase Urine Negative      Mucus Urine Present (*)     RBC Urine 0      WBC Urine <1     CBC WITH PLATELETS AND DIFFERENTIAL - Abnormal    WBC Count 5.5      RBC Count 5.54      Hemoglobin 15.0      Hematocrit 42.4      MCV 77 (*)     MCH 27.1      MCHC 35.4      RDW 13.2      Platelet Count 282      % Neutrophils 64      % Lymphocytes 26      % Monocytes 7      % Eosinophils 1      % Basophils 1      % Immature Granulocytes 1      NRBCs per 100 WBC 0      Absolute Neutrophils 3.6      Absolute Lymphocytes 1.5      Absolute Monocytes 0.4      Absolute Eosinophils 0.1      Absolute Basophils 0.0      Absolute Immature Granulocytes 0.0      Absolute NRBCs 0.0     AST - Abnormal    AST 89 (*)    ALT - Abnormal     (*)    KETONE BETA-HYDROXYBUTYRATE QUANTITATIVE, RAPID - Abnormal    Ketone (Beta-Hydroxybutyrate) Quantitative 2.20 (*)    GLUCOSE BY METER - Abnormal    GLUCOSE BY METER POCT 190 (*)    HEMOGLOBIN A1C - Abnormal    Hemoglobin A1C 9.6 (*)    GLUCOSE BY METER - Abnormal    GLUCOSE BY METER POCT 184 (*)    GLUCOSE BY METER - Abnormal    GLUCOSE BY METER POCT 210 (*)    LIPASE - Normal    Lipase 40     TROPONIN T, HIGH SENSITIVITY - Normal    Troponin T, High Sensitivity 13     EXTRA PURPLE TOP ON ICE    Hold Specimen [facility]     BLOOD GAS VENOUS   GLUCOSE MONITOR NURSING POCT   HEMOGLOBIN A1C   GLUCOSE MONITOR NURSING POCT   INFLUENZA A/B, RSV, & SARS-COV2 PCR   BASIC METABOLIC PANEL   BLOOD CULTURE   BLOOD CULTURE        US Abdomen Limited   Final Result   IMPRESSION:   1.  Hepatic steatosis.   2.  Normal gallbladder and bile ducts. No cholelithiasis and no sonographic evidence for acute cholecystitis.            XR Chest 2 Views    (Results Pending)       Treatments provided: Meds  Family Comments: None  OBS brochure/video discussed/provided to patient:  Yes  ED Medications:   Medications   lidocaine 1 % 0.1-1 mL (has no administration in time range)   lidocaine  (LMX4) cream (has no administration in time range)   sodium chloride (PF) 0.9% PF flush 3 mL (has no administration in time range)   sodium chloride (PF) 0.9% PF flush 3 mL (has no administration in time range)   senna-docusate (SENOKOT-S/PERICOLACE) 8.6-50 MG per tablet 1 tablet (has no administration in time range)     Or   senna-docusate (SENOKOT-S/PERICOLACE) 8.6-50 MG per tablet 2 tablet (has no administration in time range)   calcium carbonate (TUMS) chewable tablet 1,000 mg (has no administration in time range)   insulin regular (MYXREDLIN) 1 unit/mL infusion (2 Units/hr Intravenous Rate/Dose Change 12/23/23 2206)   glucose gel 15-30 g (has no administration in time range)     Or   dextrose 50 % injection 25-50 mL (has no administration in time range)     Or   glucagon injection 1 mg (has no administration in time range)   lidocaine 1 % 0.1-1 mL (has no administration in time range)   lidocaine (LMX4) cream (has no administration in time range)   sodium chloride (PF) 0.9% PF flush 3 mL (has no administration in time range)   sodium chloride (PF) 0.9% PF flush 3 mL (has no administration in time range)   nitroGLYcerin (NITROSTAT) sublingual tablet 0.4 mg (has no administration in time range)   hydrALAZINE (APRESOLINE) tablet 10 mg (has no administration in time range)     Or   hydrALAZINE (APRESOLINE) injection 10 mg (has no administration in time range)   acetaminophen (TYLENOL) tablet 650 mg (has no administration in time range)     Or   acetaminophen (TYLENOL) Suppository 650 mg (has no administration in time range)   HYDROmorphone (DILAUDID) half-tab 1 mg (has no administration in time range)   HYDROmorphone (DILAUDID) tablet 2 mg (has no administration in time range)   melatonin tablet 5 mg (has no administration in time range)   bisacodyl (DULCOLAX) suppository 10 mg (has no administration in time range)   ondansetron (ZOFRAN ODT) ODT tab 4 mg (has no administration in time range)     Or   ondansetron  (ZOFRAN) injection 4 mg (has no administration in time range)   guaiFENesin (ROBITUSSIN) 20 mg/mL solution 200 mg (has no administration in time range)   0.9% sodium chloride + KCl 20 mEq/L infusion (has no administration in time range)   dextrose 5% and 0.45% NaCl + KCl 20 mEq/L infusion (has no administration in time range)   sodium chloride 0.9% BOLUS 2,000 mL (0 mLs Intravenous Stopped 12/23/23 1904)   acetaminophen (TYLENOL) tablet 1,000 mg (1,000 mg Oral $Given 12/23/23 5097)       Drips infusing:  Yes  For the majority of the shift this patient was Green.   Interventions performed were Comfort.    Sepsis treatment initiated: No    Cares/treatment/interventions/medications to be completed following ED care: None    ED Nurse Name: Marvin Newton RN  10:23 PM    RECEIVING UNIT ED HANDOFF REVIEW    Above ED Nurse Handoff Report was reviewed: Yes  Reviewed by: Juanita Petit RN on December 24, 2023 at 12:01 AM

## 2023-12-24 NOTE — H&P
[facility]    History and Physical - Hospitalist Service       Date of Admission:  12/23/2023    Assessment & Plan      Josselyn Brewster is a 43 year old male admitted on 12/23/2023. He has a past medical history significant for diabetes mellitus type 2, hypertriglyceridemia, previous pancreatitis.  He presented to emergency room due to high blood glucose.  Found to have DKA.    Diabetic ketoacidosis.  Diabetes mellitus type 2.  -Normally on glargine and aspart insulin.  -Has noted blood glucose significantly higher than usual even while taking normal amounts of insulin.  -Symptoms of polyuria, polydipsia, weakness.  -DKA with elevated ketones, glucose, anion gap.  -Start continuous IV insulin infusion per DKA protocol.  -Continuous IV fluids.  -Monitor metabolic panel every 4 hours initially.    Hyponatremia.  -Mild.  -Continuous IV fluids.  -Recheck metabolic panels.    Cough.  -Present for a few weeks.  -Check influenza, RSV, SARS-CoV-2 PCR's.  -Check chest x-ray.  -Guaifenesin as needed.    Hypertriglyceridemia.  -Restart gemfibrozil.        Diet: Regular Diet Adult    DVT Prophylaxis: Pneumatic Compression Devices  Franz Catheter: Not present  Lines: None     Cardiac Monitoring: ACTIVE order. Indication: Tachyarrhythmias, acute (48 hours)  Code Status: Full Code      Clinically Significant Risk Factors Present on Admission             # Anion Gap Metabolic Acidosis: Highest Anion Gap = 19 mmol/L in last 2 days, will monitor and treat as appropriate                      Disposition Plan      Expected Discharge Date: 12/25/2023                  Jb Carcamo DO  Hospitalist Service  [facility]  Securely message with [employer] (more info)  Text page via [employer] Paging/Directory     ______________________________________________________________________    Chief Complaint   Excessive thirst, excessive urination.    History is obtained from the patient    History of  Present Illness   Josselyn Brewster is a 43 year old male who has a past medical history significant for diabetes mellitus type 2, hypertriglyceridemia, previous pancreatitis.  Previously had a problem with alcohol use disorder.  No longer drinks alcohol.  Has been having trouble controlling his blood glucose for the past 3 days.  Has been taking normal amounts of insulin.  Despite normal amounts of insulin, blood glucose has been in the 300s on a regular basis.  Has been taking extra insulin which does not seem to help his blood glucose much.  Has been very thirsty.  Drinking excessively.  Urinating frequently.  Also feeling weak.  Thirst, urinary frequency, and weakness feels similar to symptoms when he was first diagnosed with diabetes.  Has also had a cough for the past few weeks.  Cough is nonproductive.  Cough had been worse when it first started.  Has been slowly getting better over the past few days.  Has not noticed fevers or chills.  Does have chronic back pain.  Back pain has been worse over the past 2 days.  No other acute complaints.      Past Medical History    Past Medical History:   Diagnosis Date    NONSPECIFIC MEDICAL HISTORY     spinal facet  disorder- chonic       Past Surgical History   Past Surgical History:   Procedure Laterality Date    IR LUMBAR EPIDURAL STEROID INJECTION  6/13/2006    IR LUMBAR EPIDURAL STEROID INJECTION  6/13/2006    IR LUMBAR EPIDURAL STEROID INJECTION  8/1/2006    Z NONSPECIFIC PROCEDURE      laminectoomy  1999       Prior to Admission Medications   Prior to Admission Medications   Prescriptions Last Dose Informant Patient Reported? Taking?   Alcohol Swabs PADS   No No   Sig: Use to swab the area of the injection or oliver as directed Per insurance coverage   Alcohol Swabs PADS   No No   Sig: Use to swab the area of the injection or oliver as directed Per insurance coverage   Continuous Blood Gluc  (DEXCOM G6 ) BRENDA   No No   Sig: Use to read blood sugars  as per 's instructions.   Continuous Blood Gluc Sensor (DEXCOM G6 SENSOR) MISC   No No   Sig: Change every 10 days.   Continuous Blood Gluc Transmit (DEXCOM G6 TRANSMITTER) MISC   No No   Sig: Change every 3 months.   Sharps Container MISC   No No   Sig: Use as directed to dispose of needles, lancets and other sharps   acetaminophen (TYLENOL) 500 MG tablet   Yes No   Sig: Take 500-1,000 mg by mouth every 6 hours as needed for mild pain   benzonatate (TESSALON) 100 MG capsule   No No   Sig: Take 1 capsule (100 mg) by mouth 3 times daily as needed for cough   blood glucose (NO BRAND SPECIFIED) lancets standard   No No   Sig: To use to test glucose level in the blood Use to test blood sugar  4  times daily as directed. To accompany glucose monitor brands per insurance coverage.   blood glucose (NO BRAND SPECIFIED) test strip   No No   Sig: To use to test glucose level in the blood Use to test blood sugar  3 times daily as directed. To accompany glucose monitor brands per insurance coverage.   blood glucose (NO BRAND SPECIFIED) test strip   No No   Sig: To use to test glucose level in the blood Use to test blood sugar  4 times daily as directed. To accompany glucose monitor brands per insurance coverage.   blood glucose calibration (NO BRAND SPECIFIED) solution   No No   Sig: Used to calibrate the blood glucose monitor as needed and as directed.  To accompany  blood glucose brands per insurance coverage   blood glucose monitoring (NO BRAND SPECIFIED) meter device kit   No No   Sig: Use as directed Per insurance coverage   blood glucose monitoring (NO BRAND SPECIFIED) meter device kit   No No   Sig: Use as directed Per insurance coverage   citalopram (CELEXA) 40 MG tablet   Yes No   Sig: Take 40 mg by mouth daily   gemfibrozil (LOPID) 600 MG tablet   No No   Sig: Take 1 tablet (600 mg) by mouth 2 times daily (before meals) for 60 days   glucose (BD GLUCOSE) 4 g chewable tablet   No No   Sig: Take 4 tablets by  mouth every 15 minutes as needed for low blood sugar   guaiFENesin-codeine (ROBITUSSIN AC) 100-10 MG/5ML solution   No No   Sig: Take 5-10 mLs by mouth every 4 hours as needed for cough   ibuprofen (ADVIL/MOTRIN) 200 MG tablet   Yes No   Sig: Take 200-600 mg by mouth every 4 hours as needed for pain   insulin aspart (NOVOLOG PEN) 100 UNIT/ML pen   No No   Sig: Inject 1-7 Units Subcutaneous 3 times daily (before meals) Blood Sugar Low Dose: 60 - 110  No insulin; 111 - 150 2 units; 151 - 200 4 units; 201 - 250 6 units; 251 - 300 8 units; 301 - 350 10 units; >350 12 units (call physician)   insulin glargine (LANTUS PEN) 100 UNIT/ML pen   No No   Sig: Inject 15 Units Subcutaneous At Bedtime   ondansetron (ZOFRAN ODT) 4 MG ODT tab   No No   Sig: Take 1 tablet (4 mg) by mouth every 6 hours as needed for nausea or vomiting   oxyCODONE (ROXICODONE) 10 MG tablet   No No   Sig: Take 1 tablet (10 mg) by mouth every 4 hours as needed for moderate pain      Facility-Administered Medications: None        Allergies   Allergies   Allergen Reactions    Levofloxacin Hives    Morphine Other (See Comments)     Other reaction(s): Urinary Retention  Other reaction(s): Urinary Retention        Physical Exam   Vital Signs: Temp: 97.9  F (36.6  C) Temp src: Oral BP: (!) 188/127 Pulse: 88   Resp: 24 SpO2: 96 % O2 Device: None (Room air)    Weight: 0 lbs 0 oz    Gen:  NAD, A&Ox3.  Eyes:  PERRL, sclera anicteric.  OP:  MMM, no lesions.  Neck:  Supple.  CV:  Regular, no murmurs.  Lung:  CTA b/l, normal effort.  Ab:  +BS, soft.  Skin:  Warm, dry to touch.  No rash.  Ext:  No pitting edema LE b/l.      Medical Decision Making       75 MINUTES SPENT BY ME on the date of service doing chart review, history, exam, documentation & further activities per the note.      Data     I have personally reviewed the following data over the past 24 hrs:    5.5  \   15.0   / 282     130 (L) 94 (L) 12.5 /  184 (H)   4.5 17 (L) 0.84 \     ALT: 152 (H) AST: 89 (H)  AP: 107 TBILI: 0.4   ALB: 4.6 TOT PROTEIN: 7.4 LIPASE: 40     Trop: 13 BNP: N/A     TSH: N/A T4: N/A A1C: 9.6 (H)       Imaging results reviewed over the past 24 hrs:   Recent Results (from the past 24 hour(s))   US Abdomen Limited    Narrative    EXAM: US ABDOMEN LIMITED  LOCATION: [facility]  DATE: 12/23/2023    INDICATION: RUQ abdominal tenderness + chang's  COMPARISON: Ultrasound abdomen 10/20/2023 and CT 08/01/2023  TECHNIQUE: Limited abdominal ultrasound.    FINDINGS:    GALLBLADDER: Partially contracted. No gallstones, wall thickening, or pericholecystic fluid. Negative sonographic Chang's sign.    BILE DUCTS: No biliary dilatation. The common duct measures 5 mm.    LIVER: Diffusely coarsened echotexture. Normal size and surface contour. Antegrade flow in the normal caliber main portal vein. No focal mass.    RIGHT KIDNEY: No hydronephrosis.    PANCREAS: The visualized portions are normal.    No ascites.      Impression    IMPRESSION:  1.  Hepatic steatosis.  2.  Normal gallbladder and bile ducts. No cholelithiasis and no sonographic evidence for acute cholecystitis.

## 2023-12-24 NOTE — UTILIZATION REVIEW
Admission Status; Secondary Review Determination       Under the authority of the Utilization Management Committee, the utilization review process indicated a secondary review on the above patient. The review outcome is based on review of the medical records, discussions with staff, and applying clinical experience noted on the date of the review.     (x) Inpatient Status Appropriate - This patient's medical care is consistent with medical management for inpatient care and reasonable inpatient medical practice.     RATIONALE FOR DETERMINATION   43-year-old male with a history of type 2 diabetes, hypertriglyceridemia, and previous pancreatitis was admitted due to diabetic ketoacidosis (DKA). He had been on glargine and aspart insulin but experienced significantly elevated blood glucose levels, along with symptoms of polyuria, polydipsia, and weakness. DKA was confirmed with elevated ketones, glucose, and anion gap, leading to the initiation of continuous IV insulin infusion as per DKA protocol. Hyponatremia was noted but was mild, and he received continuous IV fluids with ongoing monitoring of metabolic panels.  The expected length of stay at the time of admission was more than 2 nights because of the severity of illness, intensity of service provided, and risk for adverse outcome. Inpatient admission is appropriate.         This document was produced using voice recognition software       The information on this document is developed by the utilization review team in order for the business office to ensure compliance. This only denotes the appropriateness of proper admission status and does not reflect the quality of care rendered.   The definitions of Inpatient Status and Observation Status used in making the determination above are those provided in the CMS Coverage Manual, Chapter 1 and Chapter 6, section 70.4.   Sincerely,   BILL DANIEL MD   System Medical Director   Utilization Management   [facility].

## 2023-12-24 NOTE — PHARMACY-ADMISSION MEDICATION HISTORY
Pharmacist Admission Medication History    Admission medication history is complete. The information provided in this note is only as accurate as the sources available at the time of the update.    Information Source(s): Patient and CareEverywhere/SureScripts via in-person    Pertinent Information: Pt Rx for gemfibrozil (x60 days) dispensed in July 2023 did not have any refills, pt has been out for a couple of months.    Changes made to PTA medication list:  Added: None  Deleted:   Old tessalon, robitussin, zofran, oxycodone  Duplicate diabetes supplies  Changed: None    For patients on insulin therapy:  Do you use sliding scale insulin based on blood sugars? Yes  Do you typically eat three meals a day? Yes  How many times do you check your blood glucose per day? 3  How many episodes of hypoglycemia do you typically have per month? 0    Allergies reviewed with patient and updates made in EHR: no    Medication History Completed By: Breanna Fajardo [facility] 12/23/2023 10:03 PM    Prior to Admission medications    Medication Sig Last Dose Taking? Auth Provider Long Term End Date   acetaminophen (TYLENOL) 500 MG tablet Take 500-1,000 mg by mouth every 6 hours as needed for mild pain Unknown at PRN Yes Unknown, Entered By History     citalopram (CELEXA) 40 MG tablet Take 40 mg by mouth daily 12/23/2023 Yes Unknown, Entered By History Yes    glucose (BD GLUCOSE) 4 g chewable tablet Take 4 tablets by mouth every 15 minutes as needed for low blood sugar Unknown at PRN Yes Jose Cruz, Delgado A, DO     ibuprofen (ADVIL/MOTRIN) 200 MG tablet Take 200-600 mg by mouth every 4 hours as needed for pain Unknown at PRN Yes Unknown, Entered By History     insulin aspart (NOVOLOG PEN) 100 UNIT/ML pen Inject 1-7 Units Subcutaneous 3 times daily (before meals) Blood Sugar Low Dose: 60 - 110  No insulin; 111 - 150 2 units; 151 - 200 4 units; 201 - 250 6 units; 251 - 300 8 units; 301 - 350 10 units; >350 12 units (call physician) 12/23/2023 Yes  Delgado Billingsley, DO Yes    insulin glargine (LANTUS PEN) 100 UNIT/ML pen Inject 15 Units Subcutaneous At Bedtime 12/22/2023 Yes Delgado Billingsley, DO Yes    gemfibrozil (LOPID) 600 MG tablet Take 1 tablet (600 mg) by mouth 2 times daily (before meals) for 60 days   Delgado Billingsley, DO Yes 9/1/23

## 2023-12-24 NOTE — PROGRESS NOTES
[facility]    Medicine Progress Note - Hospitalist Service    Date of Admission:  12/23/2023    Primary Care Physician   [facility]  CONSULTANTS: none     Assessment & Plan     Josselyn Brewster is a 43 year old male admitted on 12/23/2023. He has a past medical history significant for diabetes mellitus type 2, hypertriglyceridemia, previous pancreatitis.  He presented to emergency room due to high blood glucose and was found to have DKA.     Diabetic ketoacidosis, uncontrolled insulin dependent type 2 Diabetes mellitus  .  Patient at home is normally on glargine and aspart insulin. He has noted blood glucose significantly higher than usual even while taking normal amounts of insulin and is not sure the trigger.  He has had symptoms of polyuria, polydipsia, weakness. His hemoglobin A1C is 9.8. His BMP has been followed and continues to be acidotic.  He is on continuous IV insulin infusion per DKA protocol and IV fluids.  Follow his BMP and once his acidosis has subsided will transition to subcutaneous insulin.  Will add prandial insulin.      Lumbar back pain  Patient with severe right lower back pain with some sciatic symptoms.  He has no fever and his wbc is normal. Has a history of steroid injections in the past.  Given his ongoing need for opiates for pain and his DKA he could have an underlying infection to rule out.  Will get an MRI.     Hypertension   Blood pressure is uncontrolled.  Has been 155-188/100s, given diabetes will start on lisinopril and will need repeat labs with his primary care provider in a few weeks.      Hyponatremia.  -Mild 131-133 in the setting of hyperglycemia. Continuous IV fluids. Recheck BMP        Cough.  -Present for a few weeks. Negative PCR for influenza, RSV, SARS-CoV-2 . CXR negative for infiltrates.         Hypertriglyceridemia.  On home gemfibrozil. Given his diabetes should also be on a statin.           Discussed plan of care with  bedside nursing      Diet: Regular Diet Adult    DVT Prophylaxis: Pneumatic Compression Devices  Franz Catheter: Not present  Lines: None     Cardiac Monitoring: ACTIVE order. Indication: Tachyarrhythmias, acute (48 hours)    RESTRAINTS: not indicated  Code Status: Full Code        Follow up plan: will need close primary care provider follow up for his diabetes management, hypertension, and back pain     This document was created using voice recognition technology.  Please excuse any typographical errors that may have occurred.  Please call with any questions.        Clinically Significant Risk Factors Present on Admission          # Hypocalcemia: Lowest Ca = 8.2 mg/dL in last 2 days, will monitor and replace as appropriate    # Anion Gap Metabolic Acidosis: Highest Anion Gap = 20 mmol/L in last 2 days, will monitor and treat as appropriate                      Disposition Plan      Expected Discharge Date: 12/25/2023                  Barrier to discharge: needs to be on subcutaneous insulin    Pepe Venegas MD  Hospitalist Service  [facility]  Securely message with [employer] (more info)  Text page via [employer] Paging/Directory   ______________________________________________________________________    Interval History   Patient is new to me.  Patient says she doing well.  Is on insulin drip but still has a bicarb of only 20.  Denies any fever, chills, sweats.  His only complaint is severe right lower lumbar back pain that has some component of sciatica down his right buttocks.  According to the nurse, he has been requesting his opioids regularly for his pain.  Patient has no other signs or symptoms of infection at this point.      ROS: A comprehensive review of systems was negative except for items noted in the HPI.  Patient currently denies any fever, chills, sweats, nausea, vomiting, diarrhea, shortness of breath, or chest pain.      Physical Exam   Vital Signs: Temp: 97.6  F (36.4  C) Temp  src: Oral BP: (!) 155/140 Pulse: 77   Resp: 18 SpO2: 97 % O2 Device: None (Room air)    Weight: 230 lbs 1.6 oz      General appearance: Patient is alert and oriented x3, no apparent distress, pleasant and conversing normally, speaking in full sentences, appears stated age, sitting up in bed  HEENT:    Mucous membranes are moist  RESPIRATORY: Clear to auscultation bilateral, good air movement  CARDIOVASCULAR: Regular rate and rhythm, normal S1/S2, no murmurs, rubs, or gallops present  GASTROINTESTINAL: Non-distended, non-tender, soft, bowel sounds present throughout,  NEUROLOGIC:  Cranial nerves II-XII intact, without any focal deficits, strength 5/5 throughout  EXTREMITIES:  Moves all extremities, no clubbing, cyanosis, nor edema  :  Franz not present         Data     I have personally reviewed the following data over the past 24 hrs:    4.9  \   12.1 (L)   / 216     133 (L); 133 (L) 101; 101 11.0; 11.0 /  137 (H)   3.5; 3.5 20 (L); 20 (L) 0.75; 0.75 \     ALT: 132 (H) AST: 76 (H) AP: 66 TBILI: 0.4   ALB: 3.9 TOT PROTEIN: 6.1 (L) LIPASE: 40     Trop: 13 BNP: N/A     TSH: N/A T4: N/A A1C: 9.8 (H)       Imaging:   Results for orders placed or performed during the hospital encounter of 12/23/23   US Abdomen Limited    Narrative    EXAM: US ABDOMEN LIMITED  LOCATION: [facility]  DATE: 12/23/2023    INDICATION: RUQ abdominal tenderness + chang's  COMPARISON: Ultrasound abdomen 10/20/2023 and CT 08/01/2023  TECHNIQUE: Limited abdominal ultrasound.    FINDINGS:    GALLBLADDER: Partially contracted. No gallstones, wall thickening, or pericholecystic fluid. Negative sonographic Chang's sign.    BILE DUCTS: No biliary dilatation. The common duct measures 5 mm.    LIVER: Diffusely coarsened echotexture. Normal size and surface contour. Antegrade flow in the normal caliber main portal vein. No focal mass.    RIGHT KIDNEY: No hydronephrosis.    PANCREAS: The visualized portions are normal.    No  ascites.      Impression    IMPRESSION:  1.  Hepatic steatosis.  2.  Normal gallbladder and bile ducts. No cholelithiasis and no sonographic evidence for acute cholecystitis.       XR Chest 2 Views    Narrative    EXAM: CHEST 2 VIEWS  LOCATION: [facility]  DATE: 12/24/2023    INDICATION: Cough.  COMPARISON: 10/28/2023.    FINDINGS: Hypoinflated lungs. The lungs are clear. Normal size cardiac silhouette.      Impression    IMPRESSION: No evidence of active cardiopulmonary disease.      Procedures: Lumbar MRI pending    I have personally have reviewed the patient's most up to date radiologic exams, EKG, labs, orders, and medications myself

## 2023-12-24 NOTE — PLAN OF CARE
Goal Outcome Evaluation:      Plan of Care Reviewed With: patient    Overall Patient Progress: improving    Outcome Evaluation: .    VSS on RA except HTN. A&Ox4. Pt reports 7/10 back/flank pain, given PRN Dilaudid and PRN Tylenol. K and Mg protocol. D5 1/2 NS with 20 mEq KCl running at 125 ml/hr. Insulin gtt running. Neuros q4h, intact. Tele: SR. Denies SOB. SBA. Blood sugars every hour. Discharge TBD.

## 2023-12-25 ENCOUNTER — APPOINTMENT (OUTPATIENT)
Dept: MRI IMAGING | Facility: CLINIC | Age: 43
DRG: 638 | End: 2023-12-25
Attending: INTERNAL MEDICINE

## 2023-12-25 VITALS
SYSTOLIC BLOOD PRESSURE: 138 MMHG | OXYGEN SATURATION: 96 % | WEIGHT: 230.1 LBS | TEMPERATURE: 97.7 F | DIASTOLIC BLOOD PRESSURE: 89 MMHG | RESPIRATION RATE: 12 BRPM | HEART RATE: 71 BPM | BODY MASS INDEX: 32.09 KG/M2

## 2023-12-25 LAB
ANION GAP SERPL CALCULATED.3IONS-SCNC: 11 MMOL/L (ref 7–15)
BUN SERPL-MCNC: 6.3 MG/DL (ref 6–20)
CALCIUM SERPL-MCNC: 9 MG/DL (ref 8.6–10)
CHLORIDE SERPL-SCNC: 102 MMOL/L (ref 98–107)
CREAT SERPL-MCNC: 0.83 MG/DL (ref 0.67–1.17)
DEPRECATED HCO3 PLAS-SCNC: 24 MMOL/L (ref 22–29)
EGFRCR SERPLBLD CKD-EPI 2021: >90 ML/MIN/1.73M2
GLUCOSE BLDC GLUCOMTR-MCNC: 105 MG/DL (ref 70–99)
GLUCOSE BLDC GLUCOMTR-MCNC: 107 MG/DL (ref 70–99)
GLUCOSE BLDC GLUCOMTR-MCNC: 118 MG/DL (ref 70–99)
GLUCOSE BLDC GLUCOMTR-MCNC: 123 MG/DL (ref 70–99)
GLUCOSE BLDC GLUCOMTR-MCNC: 124 MG/DL (ref 70–99)
GLUCOSE BLDC GLUCOMTR-MCNC: 131 MG/DL (ref 70–99)
GLUCOSE BLDC GLUCOMTR-MCNC: 132 MG/DL (ref 70–99)
GLUCOSE BLDC GLUCOMTR-MCNC: 143 MG/DL (ref 70–99)
GLUCOSE BLDC GLUCOMTR-MCNC: 155 MG/DL (ref 70–99)
GLUCOSE BLDC GLUCOMTR-MCNC: 157 MG/DL (ref 70–99)
GLUCOSE BLDC GLUCOMTR-MCNC: 168 MG/DL (ref 70–99)
GLUCOSE BLDC GLUCOMTR-MCNC: 169 MG/DL (ref 70–99)
GLUCOSE BLDC GLUCOMTR-MCNC: 189 MG/DL (ref 70–99)
GLUCOSE BLDC GLUCOMTR-MCNC: 193 MG/DL (ref 70–99)
GLUCOSE BLDC GLUCOMTR-MCNC: 210 MG/DL (ref 70–99)
GLUCOSE BLDC GLUCOMTR-MCNC: 218 MG/DL (ref 70–99)
GLUCOSE BLDC GLUCOMTR-MCNC: 222 MG/DL (ref 70–99)
GLUCOSE BLDC GLUCOMTR-MCNC: 223 MG/DL (ref 70–99)
GLUCOSE BLDC GLUCOMTR-MCNC: 226 MG/DL (ref 70–99)
GLUCOSE BLDC GLUCOMTR-MCNC: 227 MG/DL (ref 70–99)
GLUCOSE BLDC GLUCOMTR-MCNC: 306 MG/DL (ref 70–99)
GLUCOSE SERPL-MCNC: 137 MG/DL (ref 70–99)
MAGNESIUM SERPL-MCNC: 1.8 MG/DL (ref 1.7–2.3)
POTASSIUM SERPL-SCNC: 3.7 MMOL/L (ref 3.4–5.3)
SODIUM SERPL-SCNC: 137 MMOL/L (ref 135–145)

## 2023-12-25 PROCEDURE — 36415 COLL VENOUS BLD VENIPUNCTURE: CPT | Performed by: INTERNAL MEDICINE

## 2023-12-25 PROCEDURE — 255N000002 HC RX 255 OP 636: Performed by: INTERNAL MEDICINE

## 2023-12-25 PROCEDURE — 80048 BASIC METABOLIC PNL TOTAL CA: CPT | Performed by: INTERNAL MEDICINE

## 2023-12-25 PROCEDURE — 250N000013 HC RX MED GY IP 250 OP 250 PS 637: Performed by: INTERNAL MEDICINE

## 2023-12-25 PROCEDURE — 83735 ASSAY OF MAGNESIUM: CPT | Performed by: INTERNAL MEDICINE

## 2023-12-25 PROCEDURE — 258N000003 HC RX IP 258 OP 636: Performed by: INTERNAL MEDICINE

## 2023-12-25 PROCEDURE — 250N000012 HC RX MED GY IP 250 OP 636 PS 637: Performed by: INTERNAL MEDICINE

## 2023-12-25 PROCEDURE — 250N000009 HC RX 250: Performed by: INTERNAL MEDICINE

## 2023-12-25 PROCEDURE — A9585 GADOBUTROL INJECTION: HCPCS | Performed by: INTERNAL MEDICINE

## 2023-12-25 PROCEDURE — 72158 MRI LUMBAR SPINE W/O & W/DYE: CPT

## 2023-12-25 PROCEDURE — 99239 HOSP IP/OBS DSCHRG MGMT >30: CPT | Performed by: INTERNAL MEDICINE

## 2023-12-25 RX ORDER — GADOBUTROL 604.72 MG/ML
0.1 INJECTION INTRAVENOUS ONCE
Status: COMPLETED | OUTPATIENT
Start: 2023-12-25 | End: 2023-12-25

## 2023-12-25 RX ORDER — ACETAMINOPHEN 325 MG/1
975 TABLET ORAL 4 TIMES DAILY
Status: DISCONTINUED | OUTPATIENT
Start: 2023-12-25 | End: 2023-12-25 | Stop reason: HOSPADM

## 2023-12-25 RX ORDER — OXYCODONE HYDROCHLORIDE 5 MG/1
5 TABLET ORAL EVERY 8 HOURS PRN
Qty: 10 TABLET | Refills: 0 | Status: SHIPPED | OUTPATIENT
Start: 2023-12-25

## 2023-12-25 RX ORDER — NAPROXEN 250 MG/1
500 TABLET ORAL 2 TIMES DAILY WITH MEALS
Status: DISCONTINUED | OUTPATIENT
Start: 2023-12-25 | End: 2023-12-25 | Stop reason: HOSPADM

## 2023-12-25 RX ORDER — CYCLOBENZAPRINE HCL 10 MG
10 TABLET ORAL 3 TIMES DAILY PRN
Qty: 20 TABLET | Refills: 0 | Status: SHIPPED | OUTPATIENT
Start: 2023-12-25

## 2023-12-25 RX ORDER — OXYCODONE HYDROCHLORIDE 5 MG/1
5 TABLET ORAL EVERY 4 HOURS PRN
Status: DISCONTINUED | OUTPATIENT
Start: 2023-12-25 | End: 2023-12-25 | Stop reason: HOSPADM

## 2023-12-25 RX ORDER — NAPROXEN SODIUM 220 MG
440 TABLET ORAL 2 TIMES DAILY WITH MEALS
Qty: 20 TABLET | Refills: 0 | Status: SHIPPED | OUTPATIENT
Start: 2023-12-25 | End: 2023-12-30

## 2023-12-25 RX ORDER — LISINOPRIL 20 MG/1
20 TABLET ORAL DAILY
Qty: 30 TABLET | Refills: 0 | Status: SHIPPED | OUTPATIENT
Start: 2023-12-26 | End: 2024-01-25

## 2023-12-25 RX ORDER — ACETAMINOPHEN 500 MG
1000 TABLET ORAL 4 TIMES DAILY
Start: 2023-12-25

## 2023-12-25 RX ORDER — CYCLOBENZAPRINE HCL 5 MG
10 TABLET ORAL 3 TIMES DAILY
Status: DISCONTINUED | OUTPATIENT
Start: 2023-12-25 | End: 2023-12-25 | Stop reason: HOSPADM

## 2023-12-25 RX ADMIN — ACETAMINOPHEN 975 MG: 325 TABLET, FILM COATED ORAL at 09:54

## 2023-12-25 RX ADMIN — INSULIN GLARGINE 20 UNITS: 100 INJECTION, SOLUTION SUBCUTANEOUS at 09:52

## 2023-12-25 RX ADMIN — INSULIN HUMAN 18 UNITS/HR: 1 INJECTION, SOLUTION INTRAVENOUS at 01:44

## 2023-12-25 RX ADMIN — GADOBUTROL 10.44 ML: 604.72 INJECTION INTRAVENOUS at 00:24

## 2023-12-25 RX ADMIN — INSULIN ASPART 4 UNITS: 100 INJECTION, SOLUTION INTRAVENOUS; SUBCUTANEOUS at 08:10

## 2023-12-25 RX ADMIN — LISINOPRIL 20 MG: 20 TABLET ORAL at 08:02

## 2023-12-25 RX ADMIN — POTASSIUM CHLORIDE, DEXTROSE MONOHYDRATE AND SODIUM CHLORIDE: 150; 5; 450 INJECTION, SOLUTION INTRAVENOUS at 08:06

## 2023-12-25 RX ADMIN — HYDROMORPHONE HYDROCHLORIDE 2 MG: 2 TABLET ORAL at 08:02

## 2023-12-25 RX ADMIN — CYCLOBENZAPRINE HYDROCHLORIDE 10 MG: 5 TABLET, FILM COATED ORAL at 09:54

## 2023-12-25 RX ADMIN — NAPROXEN 500 MG: 250 TABLET ORAL at 09:54

## 2023-12-25 ASSESSMENT — ACTIVITIES OF DAILY LIVING (ADL)
ADLS_ACUITY_SCORE: 18

## 2023-12-25 NOTE — DISCHARGE SUMMARY
[facility]  Hospitalist Discharge Summary      Date of Admission:  12/23/2023  Date of Discharge:  12/25/2023  Discharging Provider: Pepe Venegas MD  Discharge Service: Hospitalist Service  Primary Care Physician   [facility]    Discharge Diagnoses   Diabetic ketoacidosis  Type 2 diabetes insulin dependant, uncontrolled  Lumbar back pain  Herniated L4-5 right paracentral disc  Hypertension   Hyponatremia  Hypertriglyceridemia   Financial stress ,no insurance    Hospital Course   Josselyn Brewster is a 43 year old male admitted on 12/23/2023. He has a past medical history significant for diabetes mellitus type 2, hypertriglyceridemia, previous pancreatitis.  He presented to emergency room due to high blood glucose and was found to have Diabetic ketoacidosis.     Diabetic ketoacidosis, uncontrolled insulin dependent type 2 Diabetes mellitus  .  Patient at home is normally on glargine and aspart insulin. He has noted blood glucose significantly higher than usual even while taking normal amounts of insulin and is not sure the trigger.  He has had symptoms of polyuria, polydipsia, weakness. His hemoglobin A1C is 9.8. His BMP has been followed and  was acidotic.  He was placed on IV fluids and placed on a continuous IV insulin infusion per Diabetic ketoacidosis protocol. He continued to be acidotic and required IV insulin for  a few days but eventually has acidosis resolved and he was able to transition to subcutaneous insulin.  He will go home on a higher dose of Lantus, prandial insulin, along with an insulin correction scale.  Part of his issue may be the fact that he does not have insurance not sure about how compliant he has been.   Patient will need close follow-up with his primary care provider.  Patient was not found to have any infection etiology to lead to his DKA.     Lumbar back pain, herniated disc  Patient with severe right lower back pain with some sciatic  symptoms.  He has no fever and his wbc is normal. Has a history of steroid injections in the past.  Given his ongoing need for opiates for pain and his Diabetic ketoacidosis he could have had an underlying infection but this was ruled out. We did do a lumbar MRI showing...  1.  Interval performance of laminectomies at L3-L4 and L4-L5 since the prior study. Mild postoperative enhancement without definite MRI evidence of spinal infection.  2.  Recurrent or residual right paracentral extrusion at L4-L5 may impinge on the descending right L5 nerve. Correlate for right L5 radicular symptoms.  3.  No high-grade canal or foraminal narrowing.  4.  Progressive multilevel facet arthropathy most pronounced at L3-L4 where there are likely reactive bilateral facet joint effusions.  I would have considered place the patient on a Medrol Dosepak but given his DKA want to stay away from steroids.  He may benefit from an epidural steroid injection if his symptoms persist.  I will start the patient on Flexeril, scheduled Tylenol, 5 days of Aleve, and oxycodone for breakthrough pain.  He needs to follow-up with his primary care provider and if stable could consider Medrol Dosepak versus a steroid injection.     Hypertension   Patient has a new diagnosis of hypertension as his blood pressure has been uncontrolled.  Has been 150s-188/59-100s, given diabetes we have started him on lisinopril and will need repeat labs with his primary care provider in a few weeks.  Patient's back pain also was a contributing factor in his elevated blood pressure as well.     Hyponatremia.  Mild 131-133 in the setting of hyperglycemia.  Patient was placed on IV fluids. Recheck BMP on the day of discharge show his sodium up to 137.        Cough.  -Present for a few weeks. Negative PCR for influenza, RSV, SARS-CoV-2 . CXR negative for infiltrates.         Hypertriglyceridemia.  On home gemfibrozil. Given his diabetes should also be on a statin.  Given lack of  insurance at this point though we will consider this in the future.      Clinically Significant Risk Factors          Significant Results and Procedures   Most Recent 3 CBC's:  Recent Labs   Lab Test 12/24/23  0613 12/23/23  1718 10/28/23  2041   WBC 4.9 5.5 7.5   HGB 12.1* 15.0 14.3   MCV 77* 77* 77*    282 287     Most Recent 3 BMP's:  Recent Labs   Lab Test 12/25/23  0957 12/25/23  0855 12/25/23  0745 12/25/23  0703 12/25/23  0701 12/24/23  1229 12/24/23  1204 12/24/23  0635 12/24/23  0613   NA  --   --   --   --  137  --  133*  --  133*  133*   POTASSIUM  --   --   --   --  3.7  --  3.7  --  3.5  3.5   CHLORIDE  --   --   --   --  102  --  101  --  101  101   CO2  --   --   --   --  24  --  20*  --  20*  20*   BUN  --   --   --   --  6.3  --  10.2  --  11.0  11.0   CR  --   --   --   --  0.83  --  0.72  --  0.75  0.75   ANIONGAP  --   --   --   --  11  --  12  --  12  12   DEYA  --   --   --   --  9.0  --  8.3*  --  8.2*  8.2*   * 223* 193*   < > 137*   < > 164*   < > 135*  135*    < > = values in this interval not displayed.   ,   Results for orders placed or performed during the hospital encounter of 12/23/23   US Abdomen Limited    Narrative    EXAM: US ABDOMEN LIMITED  LOCATION: [facility]  DATE: 12/23/2023    INDICATION: RUQ abdominal tenderness + chang's  COMPARISON: Ultrasound abdomen 10/20/2023 and CT 08/01/2023  TECHNIQUE: Limited abdominal ultrasound.    FINDINGS:    GALLBLADDER: Partially contracted. No gallstones, wall thickening, or pericholecystic fluid. Negative sonographic Chang's sign.    BILE DUCTS: No biliary dilatation. The common duct measures 5 mm.    LIVER: Diffusely coarsened echotexture. Normal size and surface contour. Antegrade flow in the normal caliber main portal vein. No focal mass.    RIGHT KIDNEY: No hydronephrosis.    PANCREAS: The visualized portions are normal.    No ascites.      Impression    IMPRESSION:  1.  Hepatic  steatosis.  2.  Normal gallbladder and bile ducts. No cholelithiasis and no sonographic evidence for acute cholecystitis.       XR Chest 2 Views    Narrative    EXAM: CHEST 2 VIEWS  LOCATION: [facility]  DATE: 12/24/2023    INDICATION: Cough.  COMPARISON: 10/28/2023.    FINDINGS: Hypoinflated lungs. The lungs are clear. Normal size cardiac silhouette.      Impression    IMPRESSION: No evidence of active cardiopulmonary disease.    MR Lumbar Spine w/o & w Contrast    Narrative    [facility]  MR LUMBAR SPINE W/O and W CONTRAST  12/25/2023 12:40 AM CST     INDICATION: DKA, diabetic, severe back pain rule out infection.  TECHNIQUE: Routine.  CONTRAST: 10.44 mL Gadavist.  COMPARISON: Lumbar MRI 01/15/2014.    FINDINGS: Nomenclature is based on 5 lumbar type vertebral bodies. Five lumbar-type vertebral bodies. Interval laminectomies at L3-L4 and L4-L5 since the prior study. Mild chronic postoperative enhancement within the operative bed. No fluid   collection/abscess. No significant marrow edema. Normal distal spinal cord and cauda equina with conus medullaris at T12-L1. No extraspinal abnormality. Unremarkable visualized bony pelvis.    T12-L1: Normal disc height and signal. No herniation. No facet arthropathy. No spinal canal stenosis. No right neural foraminal stenosis. No left neural foraminal stenosis.    L1-L2: Normal disc height and signal. No herniation. Mild facet arthropathy. No spinal canal stenosis. No right neural foraminal stenosis. No left neural foraminal stenosis.    L2-L3: Normal disc height and signal. No herniation. Moderate aggressive facet arthropathy. No spinal canal stenosis. No right neural foraminal stenosis. No left neural foraminal stenosis.    L3-L4: Disc space height is preserved. Mild loss of T2 signal similar to the prior study. Mild disc bulging without focal herniation. Interval laminectomy. Moderate-to-marked progressive facet arthropathy  with small, likely reactive, facet joint   effusions. No spinal canal stenosis. Minimal right neural foraminal stenosis. Minimal left neural foraminal stenosis.    L4-L5: Moderate loss of disc space height and signal. No laminectomy. Right paracentral recurrent or residual disc extrusion has increased in size compared to the prior exam measuring 10 x 5 x 10 mm posteriorly displacing and possibly compressing the   descending right L5 nerve in the lateral recess. Moderate slightly progressive facet arthropathy. Mild residual canal narrowing. No right neural foraminal stenosis. No left neural foraminal stenosis.    L5-S1: Mild loss of disc height and signal. Mild disc bulging results in relatively stable bilateral lateral recess narrowing. Effacement of the thecal sac related to prominence of the epidural fat is unchanged. No high-grade bony canal narrowing. Mild   facet arthropathy. No right neural foraminal stenosis. No left neural foraminal stenosis.      Impression    CONCLUSION:  1.  Interval performance of laminectomies at L3-L4 and L4-L5 since the prior study. Mild postoperative enhancement without definite MRI evidence of spinal infection.    2.  Recurrent or residual right paracentral extrusion at L4-L5 may impinge on the descending right L5 nerve. Correlate for right L5 radicular symptoms.    3.  No high-grade canal or foraminal narrowing.    4.  Progressive multilevel facet arthropathy most pronounced at L3-L4 where there are likely reactive bilateral facet joint effusions.          Follow up/instructions: Patient needs to follow-up with his primary care provider making sure he has better care of his diabetes and newly diagnosed hypertension.  He was placed on an ACE inhibitor so we will need his electrolytes and creatinine checked in a couple weeks.  Will also consider starting on a statin.  Patient does not have insurance and this is a limiting factor in the ability to treat him aggressively due to his  ability to obtain medications.    Pending test results at discharge:      Unresulted Labs Ordered in the Past 30 Days of this Admission       Date and Time Order Name Status Description    12/23/2023  9:50 PM Blood Culture Peripheral Blood Preliminary     12/23/2023  9:50 PM Blood Culture Arm, Left Preliminary             Discharge Orders      Reason for your hospital stay    Diabetic ketoacidosis     Follow-up and recommended labs and tests     Follow up with primary care provider, [facility], within 7 days for hospital follow- up.  The following labs/tests are recommended: BMP, follow up back pain.     Activity    Your activity upon discharge: activity as tolerated     Diet    Follow this diet upon discharge: Orders Placed This Encounter      Regular Diet Adult       Discharge Disposition   Discharged to home  Condition at discharge: Stable      Consultations This Hospital Stay   None    Code Status   Full Code    Time Spent on this Encounter   I, Pepe Venegas MD, personally saw the patient today and spent greater than 30 minutes discharging this patient.  Discussed with nursing bedside, updated by pharm D too       This document was created using voice recognition technology.  Please excuse any typographical errors that may have occurred.  Please call with any questions.       Pepe Venegas MD  [facility] MEDICAL SURGICAL  [address] [place] [address]  Phone: [phone]  Fax: [phone]  ______________________________________________________________________    Physical Exam   Vital Signs: Temp: 97.5  F (36.4  C) Temp src: Oral BP: (!) 154/59 Pulse: 78   Resp: 16 SpO2: 98 % O2 Device: None (Room air)    Weight: 230 lbs 1.6 oz      General appearance: Patient is alert and oriented x3, no apparent distress, pleasant and conversing normally, speaking in full sentences, appears stated age, sitting up in bed  HEENT:    Mucous membranes are  moist  RESPIRATORY: Clear to auscultation bilateral, good air movement  CARDIOVASCULAR: Regular rate and rhythm, normal S1/S2, no murmurs, rubs, or gallops present  GASTROINTESTINAL: Non-distended, non-tender, soft, bowel sounds present throughout,  NEUROLOGIC:  Cranial nerves II-XII intact, without any focal deficits, strength 5/5 throughout  EXTREMITIES:  Moves all extremities, no clubbing, cyanosis, nor edema  :  Franz not present          Discharge Medications   Current Discharge Medication List        START taking these medications    Details   cyclobenzaprine (FLEXERIL) 10 MG tablet Take 1 tablet (10 mg) by mouth 3 times daily as needed for muscle spasms  Qty: 20 tablet, Refills: 0    Associated Diagnoses: Herniated intervertebral disc of lumbar spine      !! insulin aspart (HOMOLOG PEN) 100 UNIT/ML pen Inject 5 Units Subcutaneous 3 times daily (with meals)  Qty: 15 mL, Refills: 0    Associated Diagnoses: Diabetic ketoacidosis without coma associated with type 2 diabetes mellitus (H)      lisinopril (ZESTRIL) 20 MG tablet Take 1 tablet (20 mg) by mouth daily for 30 days  Qty: 30 tablet, Refills: 0    Associated Diagnoses: Uncontrolled hypertension      naproxen sodium (ANAPROX) 220 MG tablet Take 2 tablets (440 mg) by mouth 2 times daily (with meals) for 5 days  Qty: 20 tablet, Refills: 0  5 days only    Associated Diagnoses: Herniated intervertebral disc of lumbar spine      oxyCODONE (ROXICODONE) 5 MG tablet Take 1 tablet (5 mg) by mouth every 8 hours as needed for severe pain  Qty: 10 tablet, Refills: 0    Associated Diagnoses: Herniated intervertebral disc of lumbar spine       !! - Potential duplicate medications found. Please discuss with provider.        CONTINUE these medications which have CHANGED    Details   acetaminophen (TYLENOL) 500 MG tablet Take 2 tablets (1,000 mg) by mouth 4 times daily    Associated Diagnoses: Herniated intervertebral disc of lumbar spine      insulin glargine (LANTUS PEN)  100 UNIT/ML pen Inject 20 Units Subcutaneous every morning (before breakfast)  Qty: 15 mL, Refills: 0    Comments: If Lantus is not covered by insurance, may substitute Basaglar or Semglee or other insulin glargine product per insurance preference at same dose and frequency.    Associated Diagnoses: Diabetic ketoacidosis without coma associated with type 2 diabetes mellitus (H)           CONTINUE these medications which have NOT CHANGED    Details   citalopram (CELEXA) 40 MG tablet Take 40 mg by mouth daily      glucose (BD GLUCOSE) 4 g chewable tablet Take 4 tablets by mouth every 15 minutes as needed for low blood sugar  Qty: 50 tablet, Refills: 0    Associated Diagnoses: Type 2 diabetes mellitus with hyperglycemia, with long-term current use of insulin (H)      !! insulin aspart (NOVOLOG PEN) 100 UNIT/ML pen Inject 1-7 Units Subcutaneous 3 times daily (before meals) Blood Sugar Low Dose: 60 - 110  No insulin; 111 - 150 2 units; 151 - 200 4 units; 201 - 250 6 units; 251 - 300 8 units; 301 - 350 10 units; >350 12 units (call physician)  Qty: 15 mL, Refills: 3    Associated Diagnoses: Diabetic ketoacidosis without coma associated with other specified diabetes mellitus (H)      Alcohol Swabs PADS Use to swab the area of the injection or oliver as directed Per insurance coverage  Qty: 100 each, Refills: 0    Associated Diagnoses: Type 2 diabetes mellitus with hyperglycemia, with long-term current use of insulin (H)      blood glucose (NO BRAND SPECIFIED) lancets standard To use to test glucose level in the blood Use to test blood sugar  4  times daily as directed. To accompany glucose monitor brands per insurance coverage.  Qty: 100 each, Refills: 0    Associated Diagnoses: Diabetic ketoacidosis without coma associated with other specified diabetes mellitus (H)      blood glucose (NO BRAND SPECIFIED) test strip To use to test glucose level in the blood Use to test blood sugar  3 times daily as directed. To accompany  glucose monitor brands per insurance coverage.  Qty: 100 strip, Refills: 1    Associated Diagnoses: Type 2 diabetes mellitus with hyperglycemia, with long-term current use of insulin (H)      blood glucose calibration (NO BRAND SPECIFIED) solution Used to calibrate the blood glucose monitor as needed and as directed.  To accompany  blood glucose brands per insurance coverage  Qty: 1 each, Refills: 0    Associated Diagnoses: Diabetic ketoacidosis without coma associated with other specified diabetes mellitus (H)      blood glucose monitoring (NO BRAND SPECIFIED) meter device kit Use as directed Per insurance coverage  Qty: 1 kit, Refills: 0    Associated Diagnoses: Type 2 diabetes mellitus with hyperglycemia, with long-term current use of insulin (H)      Continuous Blood Gluc  (DEXCOM G6 ) BRENDA Use to read blood sugars as per 's instructions.  Qty: 1 each, Refills: 0    Associated Diagnoses: Type 2 diabetes mellitus with hyperglycemia, with long-term current use of insulin (H); Hypertriglyceridemia      Continuous Blood Gluc Sensor (DEXCOM G6 SENSOR) MISC Change every 10 days.  Qty: 3 each, Refills: 5    Associated Diagnoses: Type 2 diabetes mellitus with hyperglycemia, with long-term current use of insulin (H); Hypertriglyceridemia      Continuous Blood Gluc Transmit (DEXCOM G6 TRANSMITTER) MISC Change every 3 months.  Qty: 1 each, Refills: 1    Associated Diagnoses: Type 2 diabetes mellitus with hyperglycemia, with long-term current use of insulin (H); Hypertriglyceridemia      gemfibrozil (LOPID) 600 MG tablet Take 1 tablet (600 mg) by mouth 2 times daily (before meals) for 60 days  Qty: 120 tablet, Refills: 0    Associated Diagnoses: Hypertriglyceridemia      Sharps Container MISC Use as directed to dispose of needles, lancets and other sharps  Qty: 1 each, Refills: 0    Associated Diagnoses: Diabetic ketoacidosis without coma associated with other specified diabetes mellitus (H)        !! - Potential duplicate medications found. Please discuss with provider.        STOP taking these medications       ibuprofen (ADVIL/MOTRIN) 200 MG tablet Comments:   Reason for Stopping:             Allergies   Allergies   Allergen Reactions    Levofloxacin Hives    Morphine Other (See Comments)     Other reaction(s): Urinary Retention  Other reaction(s): Urinary Retention

## 2023-12-25 NOTE — PLAN OF CARE
Goal Outcome Evaluation:      Plan of Care Reviewed With: patient    Overall Patient Progress: improving    Outcome Evaluation: .    VSS on RA except HTN. A&Ox4. Pt reports 6/10 pain, given PRN Dilaudid. K and Mg protocol. Insulin gtt running. D5 1/2 NS with 20 mEq KCl running at 125 ml/hr. Neuros q4h, intact. Denies SOB. Tele: SR. SBA. Blood sugars every hour. MRI completed this shift. Discharge TBD.

## 2023-12-25 NOTE — PLAN OF CARE
Goal Outcome Evaluation:    Plan of Care Reviewed With: patient    Overall Patient Progress: improving    A&Ox4. Pain in back gave PO Dilaudid MD alex changed to Tylenol, Flexeril, and Naproxen. SBA -> Ind. Neuros unchanged. LS: clear. Tele: SR. K and Mg protocol, recheck in AM. On insulin drip. D5 1/2 NS + K running at 125 mL/hr. Lisinopril for HTN. Plan to wean insulin drip and discharge later today.    1000: gave Lantus, will wean insulin drip at 1200. BG since insulin drip has been off: 169 and 227. MD olsen'ed pt to discharge.    Patient discharged home via private car, walked out with staff.  Patient verbalized and received copy of discharge instructions.  Patient received medications filled by discharge Pharmacy.  Personal belongings gathered and sent with patient.  VSS. IV removed prior to discharge.

## 2023-12-25 NOTE — PLAN OF CARE
RN - Hypertensive otherwise vitally stable. Tele SR. Pt complaining of back pain - expressing adequate relief with tylenol and ordered dilaudid. Pt up with SBA in room. Tolerating PO intake. Insulin gtt continues with elevated BG - Pt receiving ordered prandial insulin with meals. Informed provider of ongoing elevated blood glucose per protocol - no new orders received. BG beginning to trend down again. Pt awaiting ordered MRI of back. Plan pending MRI results and improvement with blood glucose control.      Plan of Care Reviewed With: patient

## 2023-12-29 LAB
BACTERIA BLD CULT: NO GROWTH
BACTERIA BLD CULT: NO GROWTH

## 2024-04-17 ENCOUNTER — HOSPITAL ENCOUNTER (EMERGENCY)
Facility: CLINIC | Age: 44
Discharge: HOME | End: 2024-04-17
Attending: EMERGENCY MEDICINE | Admitting: EMERGENCY MEDICINE
Payer: COMMERCIAL

## 2024-04-17 ENCOUNTER — APPOINTMENT (OUTPATIENT)
Dept: GENERAL RADIOLOGY | Facility: CLINIC | Age: 44
End: 2024-04-17
Attending: EMERGENCY MEDICINE
Payer: COMMERCIAL

## 2024-04-17 VITALS
HEIGHT: 72 IN | WEIGHT: 220 LBS | HEART RATE: 84 BPM | OXYGEN SATURATION: 96 % | BODY MASS INDEX: 29.8 KG/M2 | DIASTOLIC BLOOD PRESSURE: 123 MMHG | RESPIRATION RATE: 20 BRPM | SYSTOLIC BLOOD PRESSURE: 180 MMHG | TEMPERATURE: 97.9 F

## 2024-04-17 DIAGNOSIS — J06.9 VIRAL URI WITH COUGH: ICD-10-CM

## 2024-04-17 DIAGNOSIS — B34.9 VIRAL SYNDROME: ICD-10-CM

## 2024-04-17 LAB
ALBUMIN SERPL BCG-MCNC: 4.5 G/DL (ref 3.5–5.2)
ALP SERPL-CCNC: 83 U/L (ref 40–150)
ALT SERPL W P-5'-P-CCNC: 91 U/L (ref 0–70)
ANION GAP SERPL CALCULATED.3IONS-SCNC: 9 MMOL/L (ref 7–15)
AST SERPL W P-5'-P-CCNC: 54 U/L (ref 0–45)
ATRIAL RATE - MUSE: 91 BPM
BASOPHILS # BLD AUTO: 0 10E3/UL (ref 0–0.2)
BASOPHILS NFR BLD AUTO: 0 %
BILIRUB SERPL-MCNC: 0.6 MG/DL
BUN SERPL-MCNC: 10.3 MG/DL (ref 6–20)
CALCIUM SERPL-MCNC: 9.4 MG/DL (ref 8.6–10)
CHLORIDE SERPL-SCNC: 97 MMOL/L (ref 98–107)
CREAT SERPL-MCNC: 1.01 MG/DL (ref 0.67–1.17)
DEPRECATED HCO3 PLAS-SCNC: 29 MMOL/L (ref 22–29)
DIASTOLIC BLOOD PRESSURE - MUSE: NORMAL MMHG
EGFRCR SERPLBLD CKD-EPI 2021: >90 ML/MIN/1.73M2
EOSINOPHIL # BLD AUTO: 0.1 10E3/UL (ref 0–0.7)
EOSINOPHIL NFR BLD AUTO: 1 %
ERYTHROCYTE [DISTWIDTH] IN BLOOD BY AUTOMATED COUNT: 12.5 % (ref 10–15)
FLUAV RNA SPEC QL NAA+PROBE: NEGATIVE
FLUBV RNA RESP QL NAA+PROBE: NEGATIVE
GLUCOSE BLDC GLUCOMTR-MCNC: 121 MG/DL (ref 70–99)
GLUCOSE BLDC GLUCOMTR-MCNC: 181 MG/DL (ref 70–99)
GLUCOSE SERPL-MCNC: 183 MG/DL (ref 70–99)
GROUP A STREP BY PCR: NOT DETECTED
HCT VFR BLD AUTO: 44.8 % (ref 40–53)
HGB BLD-MCNC: 14.4 G/DL (ref 13.3–17.7)
HOLD SPECIMEN: NORMAL
IMM GRANULOCYTES # BLD: 0 10E3/UL
IMM GRANULOCYTES NFR BLD: 0 %
INTERPRETATION ECG - MUSE: NORMAL
LYMPHOCYTES # BLD AUTO: 1.6 10E3/UL (ref 0.8–5.3)
LYMPHOCYTES NFR BLD AUTO: 25 %
MCH RBC QN AUTO: 25.2 PG (ref 26.5–33)
MCHC RBC AUTO-ENTMCNC: 32.1 G/DL (ref 31.5–36.5)
MCV RBC AUTO: 79 FL (ref 78–100)
MONOCYTES # BLD AUTO: 0.4 10E3/UL (ref 0–1.3)
MONOCYTES NFR BLD AUTO: 7 %
NEUTROPHILS # BLD AUTO: 4.1 10E3/UL (ref 1.6–8.3)
NEUTROPHILS NFR BLD AUTO: 67 %
NRBC # BLD AUTO: 0 10E3/UL
NRBC BLD AUTO-RTO: 0 /100
P AXIS - MUSE: 45 DEGREES
PLATELET # BLD AUTO: 224 10E3/UL (ref 150–450)
POTASSIUM SERPL-SCNC: 4.6 MMOL/L (ref 3.4–5.3)
PR INTERVAL - MUSE: 170 MS
PROT SERPL-MCNC: 7.5 G/DL (ref 6.4–8.3)
QRS DURATION - MUSE: 104 MS
QT - MUSE: 396 MS
QTC - MUSE: 487 MS
R AXIS - MUSE: 40 DEGREES
RBC # BLD AUTO: 5.71 10E6/UL (ref 4.4–5.9)
RSV RNA SPEC NAA+PROBE: NEGATIVE
SARS-COV-2 RNA RESP QL NAA+PROBE: NEGATIVE
SODIUM SERPL-SCNC: 135 MMOL/L (ref 135–145)
SYSTOLIC BLOOD PRESSURE - MUSE: NORMAL MMHG
T AXIS - MUSE: 35 DEGREES
TROPONIN T SERPL HS-MCNC: 17 NG/L
VENTRICULAR RATE- MUSE: 91 BPM
WBC # BLD AUTO: 6.2 10E3/UL (ref 4–11)

## 2024-04-17 PROCEDURE — 82962 GLUCOSE BLOOD TEST: CPT

## 2024-04-17 PROCEDURE — 84484 ASSAY OF TROPONIN QUANT: CPT | Performed by: EMERGENCY MEDICINE

## 2024-04-17 PROCEDURE — 71046 X-RAY EXAM CHEST 2 VIEWS: CPT

## 2024-04-17 PROCEDURE — 87637 SARSCOV2&INF A&B&RSV AMP PRB: CPT | Performed by: EMERGENCY MEDICINE

## 2024-04-17 PROCEDURE — 87651 STREP A DNA AMP PROBE: CPT | Performed by: EMERGENCY MEDICINE

## 2024-04-17 PROCEDURE — 93005 ELECTROCARDIOGRAM TRACING: CPT

## 2024-04-17 PROCEDURE — 36415 COLL VENOUS BLD VENIPUNCTURE: CPT | Performed by: EMERGENCY MEDICINE

## 2024-04-17 PROCEDURE — 80053 COMPREHEN METABOLIC PANEL: CPT | Performed by: EMERGENCY MEDICINE

## 2024-04-17 PROCEDURE — 96360 HYDRATION IV INFUSION INIT: CPT | Mod: 59

## 2024-04-17 PROCEDURE — 258N000003 HC RX IP 258 OP 636: Performed by: EMERGENCY MEDICINE

## 2024-04-17 PROCEDURE — 99285 EMERGENCY DEPT VISIT HI MDM: CPT | Mod: 25

## 2024-04-17 PROCEDURE — 85048 AUTOMATED LEUKOCYTE COUNT: CPT | Performed by: EMERGENCY MEDICINE

## 2024-04-17 RX ORDER — ONDANSETRON 2 MG/ML
4 INJECTION INTRAMUSCULAR; INTRAVENOUS EVERY 30 MIN PRN
Status: DISCONTINUED | OUTPATIENT
Start: 2024-04-17 | End: 2024-04-17 | Stop reason: HOSPADM

## 2024-04-17 RX ADMIN — SODIUM CHLORIDE 1000 ML: 9 INJECTION, SOLUTION INTRAVENOUS at 14:08

## 2024-04-17 ASSESSMENT — ACTIVITIES OF DAILY LIVING (ADL)
ADLS_ACUITY_SCORE: 38
ADLS_ACUITY_SCORE: 38
ADLS_ACUITY_SCORE: 36
ADLS_ACUITY_SCORE: 38
ADLS_ACUITY_SCORE: 38

## 2024-04-17 ASSESSMENT — COLUMBIA-SUICIDE SEVERITY RATING SCALE - C-SSRS
1. IN THE PAST MONTH, HAVE YOU WISHED YOU WERE DEAD OR WISHED YOU COULD GO TO SLEEP AND NOT WAKE UP?: NO
2. HAVE YOU ACTUALLY HAD ANY THOUGHTS OF KILLING YOURSELF IN THE PAST MONTH?: NO
6. HAVE YOU EVER DONE ANYTHING, STARTED TO DO ANYTHING, OR PREPARED TO DO ANYTHING TO END YOUR LIFE?: NO

## 2024-04-17 NOTE — ED TRIAGE NOTES
C/o 3 days of cough. Today pt c/o feeling dizzy, c/o nausea, no emesis, vision is blurred and pt is diaphoretic. States the last time he had this happen he was in DKA.      Triage Assessment (Adult)       Row Name 04/17/24 1236          Triage Assessment    Airway WDL WDL        Respiratory WDL    Respiratory WDL WDL        Skin Circulation/Temperature WDL    Skin Circulation/Temperature WDL --  diaphoretic        Cardiac WDL    Cardiac WDL WDL        Peripheral/Neurovascular WDL    Peripheral Neurovascular WDL WDL        Cognitive/Neuro/Behavioral WDL    Cognitive/Neuro/Behavioral WDL --  c/o feeling dizzy, blurred vision

## 2024-04-17 NOTE — ED NOTES
VSS, PIV removed GCS 15, ambulatory. AVS printed and reviewed w/ pt. Pt allowed time to ask questions, all questions answered. Pt discharged home

## 2024-04-17 NOTE — ED PROVIDER NOTES
History     Chief Complaint:  Cold Symptoms, Dizziness, Nausea, & Vision Changes     HPI   Josselyn Brewster is a 43 year old male with history of HTN, hyperlipidemia, and type II diabetes mellitus who presents to the ED with cold symptoms, dizziness, nausea, and vision changes. Patient states that he developed a cough and lost his voice three days ago. He has also had rhinorrhea and a mild sore throat. He then developed dizziness, blurry vision, and nausea which was similar to the symptoms he had when he was admitted to the hospital in December for DKA. Per his Decxom, his blood sugars have been running around 100-200 lately and have not exceeded 300. No recent fevers, ear pain, vomiting, hematemesis, or abdominal pain. Josselyn adds that he has been drinking a lot of water recently. He does not smoke.     Independent Historian:   None - Patient Only    Review of External Notes:   Discharge summary 12.25.23    Medications:    Celexa   Flexeril  Lopid   Glucose   Novolog Pen  Lantus Pen  Zestril  Roxicodone   Wellbutrin   Neurontin   Ozempic   Desyrel     Past Medical History:    Thoracic or lumbosacral neuritis or radiculitis   Arthritis of the spine   Mechanical and motor problems with neck and trunk   Acute pancreatitis   Pancreatic pseudocyst   DKA   Adjustment disorder with depressed mood   Eczema   HTN  Fatty liver   Depression   Hyperlipidemia   Opiate withdrawal   Opioid dependence   Personality disorder   Polysubstance abuse   Suicidal ideation  Type II diabetes mellitus   Cocaine dependence   Radicular syndrome of right leg     Past Surgical History:    Laminectomy    Hernia repair, right   L5-S1 x4 microdiscectomy     Physical Exam   Patient Vitals for the past 24 hrs:   BP Temp Temp src Pulse Resp SpO2 Height Weight   04/17/24 1700 (!) 198/131 -- -- 86 -- 97 % -- --   04/17/24 1545 (!) 181/130 -- -- 82 -- 97 % -- --   04/17/24 1530 (!) 167/128 -- -- 85 -- 95 % -- --   04/17/24 1500 (!) 179/116 -- -- 80 --  98 % -- --   04/17/24 1445 (!) 171/117 -- -- 85 -- 97 % -- --   04/17/24 1430 (!) 156/114 -- -- -- -- -- -- --   04/17/24 1415 (!) 190/117 -- -- -- -- 98 % -- --   04/17/24 1400 -- -- -- 91 -- 98 % -- --   04/17/24 1233 (!) 196/118 97.9  F (36.6  C) Temporal 94 20 99 % 1.829 m (6') 99.8 kg (220 lb)        Physical Exam  General: Patient is well appearing. No distress.   Head: Atraumatic.  Eyes: Conjunctivae and EOM are normal. No scleral icterus.  ENT: Nasal congestion, repetitive dry cough.  Neck: Normal range of motion. Neck supple.   Cardiovascular: Normal rate, regular rhythm, normal heart sounds and intact distal pulses.   Pulmonary/Chest: Breath sounds normal. No respiratory distress.  Abdominal: Soft. Bowel sounds are normal. No distension. No tenderness. No rebound or guarding.   Glucose system in RLQ abdomen.    Musculoskeletal: Normal range of motion.  Skin: Warm and dry. No rash noted. Not diaphoretic.     Emergency Department Course   ECG  Sinus HR 91 no acute injury pattern.      Imaging:  XR Chest 2 Views   Final Result   IMPRESSION: No acute cardiopulmonary disease.      PEDRO OWUSU MD            SYSTEM ID:  [id number]             Laboratory:  Labs Ordered and Resulted from Time of ED Arrival to Time of ED Departure   COMPREHENSIVE METABOLIC PANEL - Abnormal       Result Value    Sodium 135      Potassium 4.6      Carbon Dioxide (CO2) 29      Anion Gap 9      Urea Nitrogen 10.3      Creatinine 1.01      GFR Estimate >90      Calcium 9.4      Chloride 97 (*)     Glucose 183 (*)     Alkaline Phosphatase 83      AST 54 (*)     ALT 91 (*)     Protein Total 7.5      Albumin 4.5      Bilirubin Total 0.6     GLUCOSE BY METER - Abnormal    GLUCOSE BY METER POCT 181 (*)    CBC WITH PLATELETS AND DIFFERENTIAL - Abnormal    WBC Count 6.2      RBC Count 5.71      Hemoglobin 14.4      Hematocrit 44.8      MCV 79      MCH 25.2 (*)     MCHC 32.1      RDW 12.5      Platelet Count 224      % Neutrophils 67      %  Lymphocytes 25      % Monocytes 7      % Eosinophils 1      % Basophils 0      % Immature Granulocytes 0      NRBCs per 100 WBC 0      Absolute Neutrophils 4.1      Absolute Lymphocytes 1.6      Absolute Monocytes 0.4      Absolute Eosinophils 0.1      Absolute Basophils 0.0      Absolute Immature Granulocytes 0.0      Absolute NRBCs 0.0     GLUCOSE BY METER - Abnormal    GLUCOSE BY METER POCT 121 (*)    INFLUENZA A/B, RSV, & SARS-COV2 PCR - Normal    Influenza A PCR Negative      Influenza B PCR Negative      RSV PCR Negative      SARS CoV2 PCR Negative     TROPONIN T, HIGH SENSITIVITY - Normal    Troponin T, High Sensitivity 17     GROUP A STREPTOCOCCUS PCR THROAT SWAB - Normal    Group A strep by PCR Not Detected     GLUCOSE MONITOR NURSING POCT        Procedures       Emergency Department Course & Assessments:    Interventions:  Medications   ondansetron (ZOFRAN) injection 4 mg (has no administration in time range)   sodium chloride 0.9% BOLUS 1,000 mL (0 mLs Intravenous Stopped 4/17/24 1521)        Assessments:  1341 I obtained history and examined the patient as noted above.    Independent Interpretation (X-rays, CTs, rhythm strip):  CXR no effusions pneumonia cardiomegaly.      Consultations/Discussion of Management or Tests:         Social Determinants of Health affecting care:       Disposition:  The patient was discharged.     Impression & Plan           Medical Decision Making:  Pt present not feeling well systemically with no fever no hypoxia and HTN.  No neuro sx.  No signs of end organ damage CV or kidney.  Has hx of htn  and diabetes.  No elev blood glucose.  No metabolic derangement.  Electrolytes and kidney function intact.  Normal CBC and diff.  EKG no injury pattern and trop neg.  Viral swabs and strep neg.  CXr clear.  After IVF feels well wants to eat and drink.  The symptoms he is feeling could likely be viral syndrome with acute emergency found at this visit.  Supportive care and strict return  and follow up instructions given.        Diagnosis:    ICD-10-CM    1. Viral syndrome  B34.9       2. Viral URI with cough  J06.9            Discharge Medications:  New Prescriptions    No medications on file          Scribe Disclosure:  I, Glendy Whyte, am serving as a scribe at 1:41 PM on 4/17/2024 to document services personally performed by Anurag Davis MD based on my observations and the provider's statements to me.   4/17/2024   Anurag Davis MD Stevens, Andrew C, MD  04/17/24 [id number]